# Patient Record
Sex: FEMALE | Race: BLACK OR AFRICAN AMERICAN | NOT HISPANIC OR LATINO | Employment: OTHER | URBAN - METROPOLITAN AREA
[De-identification: names, ages, dates, MRNs, and addresses within clinical notes are randomized per-mention and may not be internally consistent; named-entity substitution may affect disease eponyms.]

---

## 2017-06-13 RX ORDER — ATENOLOL 25 MG/1
25 TABLET ORAL DAILY
Qty: 30 TABLET | Refills: 5 | Status: SHIPPED | OUTPATIENT
Start: 2017-06-13 | End: 2019-01-03 | Stop reason: ALTCHOICE

## 2018-06-28 RX ORDER — ATENOLOL 25 MG/1
TABLET ORAL
Qty: 30 TABLET | Refills: 4 | OUTPATIENT
Start: 2018-06-28

## 2019-01-03 ENCOUNTER — OFFICE VISIT (OUTPATIENT)
Dept: CARDIOLOGY | Facility: CLINIC | Age: 59
End: 2019-01-03

## 2019-01-03 VITALS
SYSTOLIC BLOOD PRESSURE: 147 MMHG | HEIGHT: 67 IN | HEART RATE: 65 BPM | DIASTOLIC BLOOD PRESSURE: 88 MMHG | WEIGHT: 192 LBS | BODY MASS INDEX: 30.13 KG/M2

## 2019-01-03 DIAGNOSIS — E78.5 HYPERLIPIDEMIA, UNSPECIFIED HYPERLIPIDEMIA TYPE: Primary | ICD-10-CM

## 2019-01-03 DIAGNOSIS — R94.31 ABNORMAL EKG: ICD-10-CM

## 2019-01-03 DIAGNOSIS — R06.02 SOB (SHORTNESS OF BREATH): ICD-10-CM

## 2019-01-03 DIAGNOSIS — R07.89 CHEST PRESSURE: ICD-10-CM

## 2019-01-03 DIAGNOSIS — I10 BENIGN ESSENTIAL HYPERTENSION: ICD-10-CM

## 2019-01-03 PROCEDURE — 99214 OFFICE O/P EST MOD 30 MIN: CPT | Performed by: INTERNAL MEDICINE

## 2019-01-03 PROCEDURE — 93000 ELECTROCARDIOGRAM COMPLETE: CPT | Performed by: INTERNAL MEDICINE

## 2019-01-03 RX ORDER — TIMOLOL MALEATE 5 MG/ML
1 SOLUTION/ DROPS OPHTHALMIC 2 TIMES DAILY
COMMUNITY

## 2019-01-03 RX ORDER — POTASSIUM CHLORIDE 750 MG/1
10 TABLET, FILM COATED, EXTENDED RELEASE ORAL DAILY
COMMUNITY
Start: 2018-12-20 | End: 2020-05-20 | Stop reason: ALTCHOICE

## 2019-01-03 RX ORDER — BISOPROLOL FUMARATE AND HYDROCHLOROTHIAZIDE 10; 6.25 MG/1; MG/1
1 TABLET ORAL DAILY
Qty: 30 TABLET | Refills: 6 | Status: SHIPPED | OUTPATIENT
Start: 2019-01-03 | End: 2019-08-19 | Stop reason: SDUPTHER

## 2019-01-03 RX ORDER — AZITHROMYCIN 250 MG/1
TABLET, FILM COATED ORAL
COMMUNITY
Start: 2019-01-02 | End: 2019-01-16

## 2019-01-03 RX ORDER — ASPIRIN 325 MG
325 TABLET, DELAYED RELEASE (ENTERIC COATED) ORAL DAILY
COMMUNITY

## 2019-01-03 NOTE — PROGRESS NOTES
Subjective:        Bruna Jacobs is a 58 y.o. female who here for follow up    CC  BP RUNNING HIGH    K RUNNING LOW  HPI  58-year-old female with a known history of benign essential arterial hypertension, hyperlipidemia with occasional chest pressures and abnormal EKG along with the shortness of breath here for the follow-up complaining that the patient blood pressure has been running low as well as the blood pressure has been running high     Problem List Items Addressed This Visit        Cardiovascular and Mediastinum    Benign essential hypertension    Relevant Medications    bisoprolol-hydrochlorothiazide (ZIAC) 10-6.25 MG per tablet    Other Relevant Orders    ECG 12 Lead    Stress Test With Myocardial Perfusion One Day    Adult Transthoracic Echo Complete W/ Cont if Necessary Per Protocol    Hyperlipidemia - Primary    Relevant Orders    Stress Test With Myocardial Perfusion One Day    Adult Transthoracic Echo Complete W/ Cont if Necessary Per Protocol      Other Visit Diagnoses     Chest pressure        Relevant Orders    Stress Test With Myocardial Perfusion One Day    Adult Transthoracic Echo Complete W/ Cont if Necessary Per Protocol    Abnormal EKG        Relevant Orders    Stress Test With Myocardial Perfusion One Day    Adult Transthoracic Echo Complete W/ Cont if Necessary Per Protocol    SOB (shortness of breath)        Relevant Orders    Stress Test With Myocardial Perfusion One Day    Adult Transthoracic Echo Complete W/ Cont if Necessary Per Protocol        .    The following portions of the patient's history were reviewed and updated as appropriate: allergies, current medications, past family history, past medical history, past social history, past surgical history and problem list.    Past Medical History:   Diagnosis Date   • Heart murmur    • Hyperlipidemia    • Hypertension      reports that  has never smoked. she has never used smokeless tobacco. She reports that she does not drink alcohol  "or use drugs.   Family History   Problem Relation Age of Onset   • Diabetes Mother    • Heart disease Mother    • Hypertension Mother    • Diabetes Father    • Hypertension Father    • Heart failure Father    • Heart attack Neg Hx        Review of Systems  Constitutional: No wt loss, fever, fatigue  Gastrointestinal: No nausea, abdominal pain  Behavioral/Psych: No insomnia or anxiety   Cardiovascular no chest pains or tightness in chest  Objective:       Physical Exam           Physical Exam  /88   Pulse 65   Ht 170.2 cm (67\")   Wt 87.1 kg (192 lb)   BMI 30.07 kg/m²     General appearance: NAD, conversant   Eyes: anicteric sclerae, moist conjunctivae; no lid-lag; PERRLA   HENT: Atraumatic; oropharynx clear with moist mucous membranes and no mucosal ulcerations;  normal hard and soft palate   Neck: Trachea midline; FROM, supple, no thyromegaly or lymphadenopathy   Lungs: CTA, with normal respiratory effort and no intercostal retractions   CV: S1-S2 regular, no murmurs, no rub, no gallop   Abdomen: Soft, non-tender; no masses or HSM   Extremities: No peripheral edema or extremity lymphadenopathy  Skin: Normal temperature, turgor and texture; no rash, ulcers or subcutaneous nodules   Psych: Appropriate affect, alert and oriented to person, place and time             ECG 12 Lead  Date/Time: 1/3/2019 10:16 AM  Performed by: Graciela Boland MD  Authorized by: Graciela Boland MD   Comparison: compared with previous ECG   Similar to previous ECG  Rhythm: sinus rhythm  ST Flattening: all  Clinical impression: non-specific ECG              Echocardiogram:        Current Outpatient Medications:   •  aspirin  MG tablet, Take 325 mg by mouth Every 6 (Six) Hours As Needed., Disp: , Rfl:   •  atenolol (TENORMIN) 25 MG tablet, Take 1 tablet by mouth Daily., Disp: 30 tablet, Rfl: 5  •  azithromycin (ZITHROMAX) 250 MG tablet, , Disp: , Rfl:   •  hydrochlorothiazide (HYDRODIURIL) 25 MG tablet, , Disp: , " Rfl:   •  potassium chloride (K-DUR) 10 MEQ CR tablet, , Disp: , Rfl:   •  timolol (TIMOPTIC) 0.5 % ophthalmic solution, 1 drop 2 (Two) Times a Day., Disp: , Rfl:    Assessment:        Patient Active Problem List   Diagnosis   • Benign essential hypertension   • Fatigue   • Left carotid bruit   • Palpitations               Plan:            ICD-10-CM ICD-9-CM   1. Hyperlipidemia, unspecified hyperlipidemia type E78.5 272.4   2. Benign essential hypertension I10 401.1   3. Chest pressure R07.89 786.59   4. Abnormal EKG R94.31 794.31   5. SOB (shortness of breath) R06.02 786.05     1. Hyperlipidemia, unspecified hyperlipidemia type  Risk of the hyperlipidemia, importance of the treatment has been explained    Pros and cons of the antilipemic drugs has been explained    Regular blood workup as well as side effects including the liver failure, myelopathy death has been explained        - Stress Test With Myocardial Perfusion One Day  - Adult Transthoracic Echo Complete W/ Cont if Necessary Per Protocol    2. Benign essential hypertension    - ECG 12 Lead  - Stress Test With Myocardial Perfusion One Day  - Adult Transthoracic Echo Complete W/ Cont if Necessary Per Protocol    3. Chest pressure  Considering the patient's symptoms as well as clinical situation and  EKG findings, along with cardiac risk factors, ischemic workup is necessary to rule out ischemic cardiomyopathy, stress induced arrhythmias, and functional capacity for diagnosis as well as prognostic consideration    - Stress Test With Myocardial Perfusion One Day  - Adult Transthoracic Echo Complete W/ Cont if Necessary Per Protocol    4. Abnormal EKG  Considering patient's medical condition as well as the risk factors, patient will require echocardiogram for further evaluation for the LV function, four-chamber evaluation, including the pressures, valvular function and  pericardial disease and pericardial effusion    - Stress Test With Myocardial Perfusion One  Day  - Adult Transthoracic Echo Complete W/ Cont if Necessary Per Protocol    5. SOB (shortness of breath)  Considering the patient's symptoms as well as clinical situation and  EKG findings, along with cardiac risk factors, ischemic workup is necessary to rule out ischemic cardiomyopathy, stress induced arrhythmias, and functional capacity for diagnosis as well as prognostic consideration    - Stress Test With Myocardial Perfusion One Day  - Adult Transthoracic Echo Complete W/ Cont if Necessary Per Protocol       DC HCTZ, ATENOLOL    START ZIAC    CAN NOT WALK    WALKING LEXISCAN, ECHO    SEE IN 1 MONTH  COUNSELING:    Bruna Price was given to patient for the following topics: diagnostic results, risk factor reductions, impressions, risks and benefits of treatment options and importance of treatment compliance .       SMOKING COUNSELING:    Counseling given: Not Answered      Dictated using Dragon dictation

## 2019-01-07 ENCOUNTER — HOSPITAL ENCOUNTER (OUTPATIENT)
Dept: CARDIOLOGY | Facility: HOSPITAL | Age: 59
Discharge: HOME | End: 2019-01-07
Attending: INTERNAL MEDICINE | Admitting: INTERNAL MEDICINE

## 2019-01-07 VITALS — SYSTOLIC BLOOD PRESSURE: 140 MMHG | DIASTOLIC BLOOD PRESSURE: 93 MMHG

## 2019-01-07 PROCEDURE — 93306 TTE W/DOPPLER COMPLETE: CPT

## 2019-01-07 PROCEDURE — 93306 TTE W/DOPPLER COMPLETE: CPT | Performed by: INTERNAL MEDICINE

## 2019-01-08 LAB
BH CV ECHO MEAS - ACS: 1.5 CM
BH CV ECHO MEAS - AO MAX PG (FULL): 5.4 MMHG
BH CV ECHO MEAS - AO MAX PG: 10.4 MMHG
BH CV ECHO MEAS - AO MEAN PG (FULL): 3 MMHG
BH CV ECHO MEAS - AO MEAN PG: 5 MMHG
BH CV ECHO MEAS - AO ROOT AREA (BSA CORRECTED): 1.1
BH CV ECHO MEAS - AO ROOT AREA: 3.5 CM^2
BH CV ECHO MEAS - AO ROOT DIAM: 2.1 CM
BH CV ECHO MEAS - AO V2 MAX: 161 CM/SEC
BH CV ECHO MEAS - AO V2 MEAN: 104 CM/SEC
BH CV ECHO MEAS - AO V2 VTI: 35.1 CM
BH CV ECHO MEAS - AVA(I,A): 2.1 CM^2
BH CV ECHO MEAS - AVA(I,D): 2.1 CM^2
BH CV ECHO MEAS - AVA(V,A): 2.2 CM^2
BH CV ECHO MEAS - AVA(V,D): 2.2 CM^2
BH CV ECHO MEAS - BSA(HAYCOCK): 2.1 M^2
BH CV ECHO MEAS - BSA: 2 M^2
BH CV ECHO MEAS - BZI_BMI: 30.1 KILOGRAMS/M^2
BH CV ECHO MEAS - BZI_METRIC_HEIGHT: 170.2 CM
BH CV ECHO MEAS - BZI_METRIC_WEIGHT: 87.1 KG
BH CV ECHO MEAS - EDV(CUBED): 74.1 ML
BH CV ECHO MEAS - EDV(MOD-SP2): 76 ML
BH CV ECHO MEAS - EDV(MOD-SP4): 91 ML
BH CV ECHO MEAS - EDV(TEICH): 78.6 ML
BH CV ECHO MEAS - EF(CUBED): 67.1 %
BH CV ECHO MEAS - EF(MOD-BP): 63 %
BH CV ECHO MEAS - EF(MOD-SP2): 60.5 %
BH CV ECHO MEAS - EF(MOD-SP4): 62.6 %
BH CV ECHO MEAS - EF(TEICH): 59 %
BH CV ECHO MEAS - ESV(CUBED): 24.4 ML
BH CV ECHO MEAS - ESV(MOD-SP2): 30 ML
BH CV ECHO MEAS - ESV(MOD-SP4): 34 ML
BH CV ECHO MEAS - ESV(TEICH): 32.2 ML
BH CV ECHO MEAS - FS: 31 %
BH CV ECHO MEAS - IVS/LVPW: 1
BH CV ECHO MEAS - IVSD: 1 CM
BH CV ECHO MEAS - LAT PEAK E' VEL: 5 CM/SEC
BH CV ECHO MEAS - LV DIASTOLIC VOL/BSA (35-75): 45.8 ML/M^2
BH CV ECHO MEAS - LV MASS(C)D: 137.2 GRAMS
BH CV ECHO MEAS - LV MASS(C)DI: 69.1 GRAMS/M^2
BH CV ECHO MEAS - LV MAX PG: 5 MMHG
BH CV ECHO MEAS - LV MEAN PG: 2 MMHG
BH CV ECHO MEAS - LV SYSTOLIC VOL/BSA (12-30): 17.1 ML/M^2
BH CV ECHO MEAS - LV V1 MAX: 112 CM/SEC
BH CV ECHO MEAS - LV V1 MEAN: 63.4 CM/SEC
BH CV ECHO MEAS - LV V1 VTI: 23.8 CM
BH CV ECHO MEAS - LVIDD: 4.2 CM
BH CV ECHO MEAS - LVIDS: 2.9 CM
BH CV ECHO MEAS - LVLD AP2: 7.3 CM
BH CV ECHO MEAS - LVLD AP4: 7.7 CM
BH CV ECHO MEAS - LVLS AP2: 6.8 CM
BH CV ECHO MEAS - LVLS AP4: 7 CM
BH CV ECHO MEAS - LVOT AREA (M): 3.1 CM^2
BH CV ECHO MEAS - LVOT AREA: 3.1 CM^2
BH CV ECHO MEAS - LVOT DIAM: 2 CM
BH CV ECHO MEAS - LVPWD: 1 CM
BH CV ECHO MEAS - MED PEAK E' VEL: 5 CM/SEC
BH CV ECHO MEAS - MV A DUR: 0.12 SEC
BH CV ECHO MEAS - MV A MAX VEL: 106 CM/SEC
BH CV ECHO MEAS - MV DEC SLOPE: 258 CM/SEC^2
BH CV ECHO MEAS - MV DEC TIME: 0.27 SEC
BH CV ECHO MEAS - MV E MAX VEL: 71.1 CM/SEC
BH CV ECHO MEAS - MV E/A: 0.67
BH CV ECHO MEAS - MV MAX PG: 4.2 MMHG
BH CV ECHO MEAS - MV MEAN PG: 1 MMHG
BH CV ECHO MEAS - MV P1/2T MAX VEL: 81.7 CM/SEC
BH CV ECHO MEAS - MV P1/2T: 92.7 MSEC
BH CV ECHO MEAS - MV V2 MAX: 103 CM/SEC
BH CV ECHO MEAS - MV V2 MEAN: 43.1 CM/SEC
BH CV ECHO MEAS - MV V2 VTI: 37.6 CM
BH CV ECHO MEAS - MVA P1/2T LCG: 2.7 CM^2
BH CV ECHO MEAS - MVA(P1/2T): 2.4 CM^2
BH CV ECHO MEAS - MVA(VTI): 2 CM^2
BH CV ECHO MEAS - PA ACC TIME: 0.1 SEC
BH CV ECHO MEAS - PA MAX PG (FULL): 2.1 MMHG
BH CV ECHO MEAS - PA MAX PG: 3.9 MMHG
BH CV ECHO MEAS - PA PR(ACCEL): 34 MMHG
BH CV ECHO MEAS - PA V2 MAX: 99 CM/SEC
BH CV ECHO MEAS - PULM A REVS DUR: 0.13 SEC
BH CV ECHO MEAS - PULM A REVS VEL: 27.8 CM/SEC
BH CV ECHO MEAS - PULM DIAS VEL: 51.4 CM/SEC
BH CV ECHO MEAS - PULM S/D: 1.4
BH CV ECHO MEAS - PULM SYS VEL: 69.8 CM/SEC
BH CV ECHO MEAS - PVA(V,A): 3.6 CM^2
BH CV ECHO MEAS - PVA(V,D): 3.6 CM^2
BH CV ECHO MEAS - QP/QS: 1.1
BH CV ECHO MEAS - RAP SYSTOLE: 3 MMHG
BH CV ECHO MEAS - RV MAX PG: 1.8 MMHG
BH CV ECHO MEAS - RV MEAN PG: 1 MMHG
BH CV ECHO MEAS - RV V1 MAX: 67.1 CM/SEC
BH CV ECHO MEAS - RV V1 MEAN: 43.4 CM/SEC
BH CV ECHO MEAS - RV V1 VTI: 15.9 CM
BH CV ECHO MEAS - RVOT AREA: 5.3 CM^2
BH CV ECHO MEAS - RVOT DIAM: 2.6 CM
BH CV ECHO MEAS - SI(AO): 61.2 ML/M^2
BH CV ECHO MEAS - SI(CUBED): 25 ML/M^2
BH CV ECHO MEAS - SI(LVOT): 37.6 ML/M^2
BH CV ECHO MEAS - SI(MOD-SP2): 23.1 ML/M^2
BH CV ECHO MEAS - SI(MOD-SP4): 28.7 ML/M^2
BH CV ECHO MEAS - SI(TEICH): 23.3 ML/M^2
BH CV ECHO MEAS - SUP REN AO DIAM: 1.4 CM
BH CV ECHO MEAS - SV(AO): 121.6 ML
BH CV ECHO MEAS - SV(CUBED): 49.7 ML
BH CV ECHO MEAS - SV(LVOT): 74.8 ML
BH CV ECHO MEAS - SV(MOD-SP2): 46 ML
BH CV ECHO MEAS - SV(MOD-SP4): 57 ML
BH CV ECHO MEAS - SV(RVOT): 84.4 ML
BH CV ECHO MEAS - SV(TEICH): 46.4 ML
BH CV ECHO MEAS - TAPSE (>1.6): 1.9 CM2
BH CV ECHO MEASUREMENTS AVERAGE E/E' RATIO: 14.22
BH CV VAS BP RIGHT ARM: NORMAL MMHG
BH CV XLRA - RV BASE: 3.2 CM
BH CV XLRA - TDI S': 17 CM/SEC
LEFT ATRIUM VOLUME INDEX: 19 ML/M2
MAXIMAL PREDICTED HEART RATE: 161 BPM
STRESS TARGET HR: 137 BPM

## 2019-01-16 ENCOUNTER — HOSPITAL ENCOUNTER (OUTPATIENT)
Dept: CARDIOLOGY | Facility: HOSPITAL | Age: 59
Discharge: HOME | End: 2019-01-16
Attending: INTERNAL MEDICINE

## 2019-01-16 ENCOUNTER — TELEPHONE (OUTPATIENT)
Dept: CARDIOLOGY | Facility: CLINIC | Age: 59
End: 2019-01-16

## 2019-01-16 VITALS
WEIGHT: 193 LBS | BODY MASS INDEX: 30.29 KG/M2 | OXYGEN SATURATION: 96 % | DIASTOLIC BLOOD PRESSURE: 64 MMHG | HEART RATE: 45 BPM | HEIGHT: 67 IN | SYSTOLIC BLOOD PRESSURE: 126 MMHG | RESPIRATION RATE: 18 BRPM

## 2019-01-16 PROCEDURE — 78452 HT MUSCLE IMAGE SPECT MULT: CPT | Performed by: INTERNAL MEDICINE

## 2019-01-16 PROCEDURE — 78452 HT MUSCLE IMAGE SPECT MULT: CPT

## 2019-01-16 PROCEDURE — 93017 CV STRESS TEST TRACING ONLY: CPT

## 2019-01-16 PROCEDURE — 25010000002 REGADENOSON 0.4 MG/5ML SOLUTION: Performed by: INTERNAL MEDICINE

## 2019-01-16 PROCEDURE — 93016 CV STRESS TEST SUPVJ ONLY: CPT | Performed by: NURSE PRACTITIONER

## 2019-01-16 PROCEDURE — 93018 CV STRESS TEST I&R ONLY: CPT | Performed by: INTERNAL MEDICINE

## 2019-01-16 PROCEDURE — A9500 TC99M SESTAMIBI: HCPCS | Performed by: INTERNAL MEDICINE

## 2019-01-16 PROCEDURE — 0 TECHNETIUM SESTAMIBI: Performed by: INTERNAL MEDICINE

## 2019-01-16 RX ORDER — LORAZEPAM 0.5 MG/1
TABLET ORAL
COMMUNITY
Start: 2019-01-10 | End: 2019-03-15 | Stop reason: HOSPADM

## 2019-01-16 RX ADMIN — TECHNETIUM TC 99M SESTAMIBI 1 DOSE: 1 INJECTION INTRAVENOUS at 09:42

## 2019-01-16 RX ADMIN — REGADENOSON 0.4 MG: 0.08 INJECTION, SOLUTION INTRAVENOUS at 09:43

## 2019-01-16 RX ADMIN — TECHNETIUM TC 99M SESTAMIBI 1 DOSE: 1 INJECTION INTRAVENOUS at 07:35

## 2019-01-16 NOTE — TELEPHONE ENCOUNTER
Patient started on repatha, missed home visit to instruct patient on use of injection pen.   Teaching provided to patient with satisfactory return demonstration. Step-by-step guide given to patient for reference.   ALP

## 2019-01-22 ENCOUNTER — OFFICE VISIT (OUTPATIENT)
Dept: OBSTETRICS AND GYNECOLOGY | Facility: CLINIC | Age: 59
End: 2019-01-22

## 2019-01-22 VITALS
WEIGHT: 197 LBS | DIASTOLIC BLOOD PRESSURE: 80 MMHG | HEIGHT: 67 IN | SYSTOLIC BLOOD PRESSURE: 124 MMHG | BODY MASS INDEX: 30.92 KG/M2

## 2019-01-22 DIAGNOSIS — Z01.419 ENCOUNTER FOR ANNUAL ROUTINE GYNECOLOGICAL EXAMINATION: Primary | ICD-10-CM

## 2019-01-22 LAB
BH CV STRESS BP STAGE 1: NORMAL
BH CV STRESS COMMENTS STAGE 1: NORMAL
BH CV STRESS DOSE REGADENOSON STAGE 1: 0.4
BH CV STRESS DURATION MIN STAGE 1: 3
BH CV STRESS DURATION SEC STAGE 1: 1
BH CV STRESS GRADE STAGE 1: 0
BH CV STRESS HR STAGE 1: 83
BH CV STRESS METS STAGE 1: 1.4
BH CV STRESS PROTOCOL 1: NORMAL
BH CV STRESS RECOVERY BP: NORMAL MMHG
BH CV STRESS RECOVERY HR: 64 BPM
BH CV STRESS RECOVERY O2: 96 %
BH CV STRESS SPEED STAGE 1: 1
BH CV STRESS STAGE 1: 1
LV EF NUC BP: 75 %
MAXIMAL PREDICTED HEART RATE: 161 BPM
PERCENT MAX PREDICTED HR: 51.55 %
STRESS BASELINE BP: NORMAL MMHG
STRESS BASELINE HR: 51 BPM
STRESS O2 SAT REST: 96 %
STRESS PERCENT HR: 61 %
STRESS POST ESTIMATED WORKLOAD: 1.4 METS
STRESS POST EXERCISE DUR MIN: 3 MIN
STRESS POST EXERCISE DUR SEC: 1 SEC
STRESS POST PEAK BP: NORMAL MMHG
STRESS POST PEAK HR: 83 BPM
STRESS TARGET HR: 137 BPM

## 2019-01-22 PROCEDURE — 99396 PREV VISIT EST AGE 40-64: CPT | Performed by: OBSTETRICS & GYNECOLOGY

## 2019-01-22 NOTE — PROGRESS NOTES
Subjective:    Patient Bruna Jacobs is a 58 y.o. female.   Chief Complaint   Patient presents with   • Gynecologic Exam     AE,      CC annual exam    HPI      The following portions of the patient's history were reviewed and updated as appropriate: allergies, current medications, past family history, past medical history, past social history, past surgical history and problem list.      Review of Systems   Constitutional: Negative.    HENT: Negative.    Eyes: Negative.    Respiratory: Negative.    Cardiovascular: Negative.    Gastrointestinal: Negative for abdominal distention, abdominal pain, anal bleeding, blood in stool, constipation, diarrhea, nausea, rectal pain and vomiting.   Endocrine: Negative for cold intolerance, heat intolerance, polydipsia, polyphagia and polyuria.   Genitourinary: Negative.  Negative for decreased urine volume, dyspareunia, dysuria, enuresis, flank pain, frequency, genital sores, hematuria, menstrual problem, pelvic pain, urgency, vaginal bleeding, vaginal discharge and vaginal pain.   Musculoskeletal: Negative.    Skin: Negative.    Allergic/Immunologic: Negative.    Neurological: Negative.    Hematological: Negative for adenopathy. Does not bruise/bleed easily.   Psychiatric/Behavioral: Negative for agitation, confusion and sleep disturbance. The patient is not nervous/anxious.          Objective:      Physical Exam   Constitutional: She appears well-developed and well-nourished. She is not intubated.   HENT:   Head: Hair is normal.   Nose: Nose normal.   Mouth/Throat: Oropharynx is clear and moist.   Eyes: Conjunctivae are normal.   Neck: Normal carotid pulses and no JVD present. No tracheal tenderness, no spinous process tenderness and no muscular tenderness present. Carotid bruit is not present. No neck rigidity. No edema, no erythema and normal range of motion present. No thyroid mass and no thyromegaly present.   Cardiovascular: Normal rate, regular rhythm, S1 normal and  normal heart sounds. Exam reveals no gallop.   No murmur heard.  Pulmonary/Chest: Effort normal. No accessory muscle usage or stridor. No apnea, no tachypnea and no bradypnea. She is not intubated. No respiratory distress. She has no wheezes. She has no rales. She exhibits no tenderness. Right breast exhibits no inverted nipple, no mass, no nipple discharge, no skin change and no tenderness. Left breast exhibits no inverted nipple, no mass, no nipple discharge, no skin change and no tenderness.   Abdominal: Soft. Bowel sounds are normal. She exhibits no distension and no mass. There is no tenderness. There is no rebound and no guarding. No hernia.   Genitourinary: Vagina normal and uterus normal. Rectal exam shows no external hemorrhoid, no internal hemorrhoid, no fissure, no mass, no tenderness and anal tone normal. There is no rash, tenderness, lesion or injury on the right labia. There is no rash, tenderness, lesion or injury on the left labia. Uterus is not deviated, not enlarged, not fixed and not tender. Cervix exhibits no motion tenderness, no discharge and no friability. Right adnexum displays no mass and no tenderness. Left adnexum displays no mass and no tenderness. No erythema, tenderness or bleeding in the vagina. No foreign body in the vagina. No signs of injury around the vagina. No vaginal discharge found.   Musculoskeletal: She exhibits no edema or tenderness.        Right shoulder: She exhibits no tenderness, no swelling, no pain and no spasm.   Lymphadenopathy:        Head (right side): No submental, no submandibular, no tonsillar, no preauricular, no posterior auricular and no occipital adenopathy present.        Head (left side): No submental, no submandibular, no tonsillar, no preauricular, no posterior auricular and no occipital adenopathy present.     She has no cervical adenopathy.        Right cervical: No superficial cervical, no deep cervical and no posterior cervical adenopathy present.        Left cervical: No superficial cervical, no deep cervical and no posterior cervical adenopathy present.        Right axillary: No pectoral and no lateral adenopathy present.        Left axillary: No pectoral and no lateral adenopathy present.       Right: No inguinal, no supraclavicular and no epitrochlear adenopathy present.        Left: No inguinal, no supraclavicular and no epitrochlear adenopathy present.   Neurological: No cranial nerve deficit. Coordination normal.   Skin: Skin is warm and dry. No abrasion, no bruising, no burn, no lesion, no petechiae, no purpura and no rash noted. Rash is not macular, not maculopapular, not nodular and not urticarial. No cyanosis or erythema. No pallor. Nails show no clubbing.   Psychiatric: She has a normal mood and affect. Her behavior is normal.         Assessment and Plan:    Patient has been instructed to perform a self breast exam on a weekly basis, a yearly mammogram, pap smear yearly unless instructed otherwise and bone density every 2 years.  I recommended that the patient not smoke, and discussed smoking cessation when appropriate.     Bruna was seen today for gynecologic exam.    Diagnoses and all orders for this visit:    Encounter for annual routine gynecological examination  -     Pap IG, Rfx HPV ASCU

## 2019-01-24 LAB
CONV .: NORMAL
CYTOLOGIST CVX/VAG CYTO: NORMAL
CYTOLOGY CVX/VAG DOC THIN PREP: NORMAL
DX ICD CODE: NORMAL
HIV 1 & 2 AB SER-IMP: NORMAL
Lab: NORMAL
OTHER STN SPEC: NORMAL
PATH REPORT.FINAL DX SPEC: NORMAL
STAT OF ADQ CVX/VAG CYTO-IMP: NORMAL

## 2019-02-05 ENCOUNTER — OFFICE VISIT (OUTPATIENT)
Dept: CARDIOLOGY | Facility: CLINIC | Age: 59
End: 2019-02-05

## 2019-02-05 VITALS
BODY MASS INDEX: 30.76 KG/M2 | DIASTOLIC BLOOD PRESSURE: 86 MMHG | SYSTOLIC BLOOD PRESSURE: 150 MMHG | WEIGHT: 196 LBS | HEIGHT: 67 IN | HEART RATE: 50 BPM

## 2019-02-05 DIAGNOSIS — R00.2 PALPITATIONS: Primary | ICD-10-CM

## 2019-02-05 DIAGNOSIS — R53.83 OTHER FATIGUE: ICD-10-CM

## 2019-02-05 DIAGNOSIS — I10 BENIGN ESSENTIAL HYPERTENSION: ICD-10-CM

## 2019-02-05 DIAGNOSIS — E78.5 HYPERLIPIDEMIA, UNSPECIFIED HYPERLIPIDEMIA TYPE: ICD-10-CM

## 2019-02-05 PROCEDURE — 99214 OFFICE O/P EST MOD 30 MIN: CPT | Performed by: INTERNAL MEDICINE

## 2019-02-05 RX ORDER — EVOLOCUMAB 420 MG/3.5
KIT SUBCUTANEOUS
COMMUNITY
Start: 2019-01-31 | End: 2020-08-23

## 2019-02-05 RX ORDER — AMLODIPINE BESYLATE 5 MG/1
5 TABLET ORAL DAILY
Qty: 30 TABLET | Refills: 11 | Status: SHIPPED | OUTPATIENT
Start: 2019-02-05 | End: 2019-03-05 | Stop reason: ALTCHOICE

## 2019-02-05 NOTE — PROGRESS NOTES
" Subjective:        Bruna Jacobs is a 58 y.o. female who here for follow up    CC  Follow up after starting ziac, bp still running high  HPI  58-year-old female with known history of the benign essential arterial hypertension, palpitations, hyperlipidemia here for the follow-up after the patient was started on Ziac patient blood pressure still running high    Patient denies any chest pains or tightness in chest with no heaviness of the pressure sensation     Problem List Items Addressed This Visit        Cardiovascular and Mediastinum    Benign essential hypertension    Relevant Medications    amLODIPine (NORVASC) 5 MG tablet    Palpitations - Primary    Hyperlipidemia    Relevant Medications    REPATHA PUSHTRONEX SYSTEM 420 MG/3.5ML solution cartridge       Other    Fatigue        .    The following portions of the patient's history were reviewed and updated as appropriate: allergies, current medications, past family history, past medical history, past social history, past surgical history and problem list.    Past Medical History:   Diagnosis Date   • Glaucoma    • Heart murmur    • Hyperlipidemia    • Hypertension      reports that  has never smoked. she has never used smokeless tobacco. She reports that she does not drink alcohol or use drugs.   Family History   Problem Relation Age of Onset   • Diabetes Mother    • Heart disease Mother    • Hypertension Mother    • Diabetes Father    • Hypertension Father    • Heart failure Father    • Heart disease Father    • Heart attack Neg Hx        Review of Systems  Constitutional: No wt loss, fever, fatigue  Gastrointestinal: No nausea, abdominal pain  Behavioral/Psych: No insomnia or anxiety   Cardiovascular no chest pains or tightness in the chest  Objective:        /86   Pulse 50   Ht 170.2 cm (67\")   Wt 88.9 kg (196 lb)   BMI 30.70 kg/m²   General appearance: No acute changes   Neck: Trachea midline; NECK, supple, no thyromegaly or lymphadenopathy "   Lungs: Normal size and shape, normal breath sounds, equal distribution of air, no rales and rhonchi   CV: S1-S2 regular, no murmurs, no rub, no gallop   Abdomen: Soft, non-tender; no masses , no abnormal abdominal sounds   Extremities: No deformity , normal color , no peripheral edema   Skin: Normal temperature, turgor and texture; no rash, ulcers          Procedures      Echocardiogram:        Current Outpatient Medications:   •  aspirin  MG tablet, Take 325 mg by mouth Every 6 (Six) Hours As Needed., Disp: , Rfl:   •  bisoprolol-hydrochlorothiazide (ZIAC) 10-6.25 MG per tablet, Take 1 tablet by mouth Daily., Disp: 30 tablet, Rfl: 6  •  LORazepam (ATIVAN) 0.5 MG tablet, , Disp: , Rfl:   •  potassium chloride (K-DUR) 10 MEQ CR tablet, , Disp: , Rfl:   •  timolol (TIMOPTIC) 0.5 % ophthalmic solution, 1 drop 2 (Two) Times a Day., Disp: , Rfl:   •  REPATHA PUSHTRONEX SYSTEM 420 MG/3.5ML solution cartridge, , Disp: , Rfl:    Assessment:        Patient Active Problem List   Diagnosis   • Benign essential hypertension   • Fatigue   • Left carotid bruit   • Palpitations   • Hyperlipidemia     Interpretation Summary        · Findings consistent with a normal ECG stress test.  · Left ventricular ejection fraction is hyperdynamic (Calculated EF > 70%).  · Myocardial perfusion imaging indicates a normal myocardial perfusion study with no evidence of ischemia.  · Impressions are consistent with a low risk study.         Interpretation Summary     · Saline bubble test neg for PFO  · Calculated EF = 63.0%  · There is no evidence of pericardial effusion.           Plan:            ICD-10-CM ICD-9-CM   1. Palpitations R00.2 785.1   2. Hyperlipidemia, unspecified hyperlipidemia type E78.5 272.4   3. Benign essential hypertension I10 401.1   4. Other fatigue R53.83 780.79     1. Palpitations  Palpitations better    2. Hyperlipidemia, unspecified hyperlipidemia type  Continue the medication    3. Benign essential  hypertension  Blood pressure still high will add Norvasc    4. Other fatigue  Multifactorial       norvasc  5 mg po daily    See in 1 month    Specificity and sensitivity of the stress test/ cardiac workup has been explained. Pt has been explained if  Symptoms continue please go to ER, and further w/p will be required.    Also explained this does not rule out coronary artery disease or the future events, continue to emphasize on risk reductions for coronary artery disease    Pt also advised to contact PCP for other causes of symptoms    COUNSELING:    Bruna Price was given to patient for the following topics: diagnostic results, risk factor reductions, impressions, risks and benefits of treatment options and importance of treatment compliance .       SMOKING COUNSELING:    Counseling given: Not Answered      Dictated using Dragon dictation

## 2019-03-05 ENCOUNTER — OFFICE VISIT (OUTPATIENT)
Dept: CARDIOLOGY | Facility: CLINIC | Age: 59
End: 2019-03-05

## 2019-03-05 VITALS
WEIGHT: 199 LBS | HEIGHT: 67 IN | HEART RATE: 58 BPM | DIASTOLIC BLOOD PRESSURE: 91 MMHG | BODY MASS INDEX: 31.23 KG/M2 | SYSTOLIC BLOOD PRESSURE: 170 MMHG

## 2019-03-05 DIAGNOSIS — I10 BENIGN ESSENTIAL HYPERTENSION: ICD-10-CM

## 2019-03-05 DIAGNOSIS — E78.5 HYPERLIPIDEMIA, UNSPECIFIED HYPERLIPIDEMIA TYPE: ICD-10-CM

## 2019-03-05 DIAGNOSIS — R07.2 PRECORDIAL PAIN: Primary | ICD-10-CM

## 2019-03-05 DIAGNOSIS — R00.2 PALPITATION: ICD-10-CM

## 2019-03-05 PROCEDURE — 99214 OFFICE O/P EST MOD 30 MIN: CPT | Performed by: INTERNAL MEDICINE

## 2019-03-05 RX ORDER — NIFEDIPINE 30 MG/1
30 TABLET, EXTENDED RELEASE ORAL DAILY
Qty: 30 TABLET | Refills: 6 | Status: SHIPPED | OUTPATIENT
Start: 2019-03-05 | End: 2019-05-06

## 2019-03-05 NOTE — H&P (VIEW-ONLY)
Subjective:        Bruna Jacobs is a 58 y.o. female who here for follow up    CC  Sob on excertion    Heart beating fast    Hair falling  HPI  58-year-old female with known history of the benign essential arterial hypertension, hyperlipidemia palpitations as well as a rapid heartbeats continues to complains of shortness of breath on exertion as well as moderate intensity palpitations intermittent     Problem List Items Addressed This Visit        Cardiovascular and Mediastinum    Benign essential hypertension    Relevant Medications    NIFEdipine XL (PROCARDIA XL) 30 MG 24 hr tablet    Hyperlipidemia    Palpitation    Relevant Orders    Treadmill Stress Test    Case Request Cath Lab: Left Heart Cath (Completed)    aPTT (Completed)    Protime-INR (Completed)    CBC & Differential (Completed)    Basic Metabolic Panel (Completed)    CBC Auto Differential (Completed)    Manual Differential    Manual Differential (Completed)       Nervous and Auditory    Precordial pain - Primary    Relevant Orders    Case Request Cath Lab: Left Heart Cath (Completed)    aPTT (Completed)    Protime-INR (Completed)    CBC & Differential (Completed)    Basic Metabolic Panel (Completed)    CBC Auto Differential (Completed)    Manual Differential    Manual Differential (Completed)        .    The following portions of the patient's history were reviewed and updated as appropriate: allergies, current medications, past family history, past medical history, past social history, past surgical history and problem list.    Past Medical History:   Diagnosis Date   • Glaucoma    • Heart murmur    • Hyperlipidemia    • Hypertension      reports that  has never smoked. she has never used smokeless tobacco. She reports that she does not drink alcohol or use drugs.   Family History   Problem Relation Age of Onset   • Diabetes Mother    • Heart disease Mother    • Hypertension Mother    • Diabetes Father    • Hypertension Father    • Heart failure  "Father    • Heart disease Father    • Heart attack Neg Hx        Review of Systems  Constitutional: No wt loss, fever, fatigue  Gastrointestinal: No nausea, abdominal pain  Behavioral/Psych: No insomnia or anxiety   Cardiovascular shortness of breath and palpitations  Objective:       Physical Exam           Physical Exam  /91   Pulse 58   Ht 170.2 cm (67\")   Wt 90.3 kg (199 lb)   BMI 31.17 kg/m²     General appearance: No acute changes   Eyes: Sclera conjunctiva normal, pupils reactive   HENT: Atraumatic; oropharynx clear with moist mucous membranes and no mucosal ulcerations;  Neck: Trachea midline; NECK, supple, no thyromegaly or lymphadenopathy   Lungs: Normal size and shape, normal breath sounds, equal distribution of air, no rales and rhonchi   CV: S1-S2 regular, no murmurs, no rub, no gallop   Abdomen: Soft, non-tender; no masses , no abnormal abdominal sounds   Extremities: No deformity , normal color , no peripheral edema   Skin: Normal temperature, turgor and texture; no rash, ulcers  Psych: Appropriate affect, alert and oriented to person, place and time           Procedures      Echocardiogram:        Current Outpatient Medications:   •  amLODIPine (NORVASC) 5 MG tablet, Take 1 tablet by mouth Daily., Disp: 30 tablet, Rfl: 11  •  aspirin  MG tablet, Take 325 mg by mouth Every 6 (Six) Hours As Needed., Disp: , Rfl:   •  bisoprolol-hydrochlorothiazide (ZIAC) 10-6.25 MG per tablet, Take 1 tablet by mouth Daily., Disp: 30 tablet, Rfl: 6  •  LORazepam (ATIVAN) 0.5 MG tablet, , Disp: , Rfl:   •  potassium chloride (K-DUR) 10 MEQ CR tablet, , Disp: , Rfl:   •  REPATHA PUSHTRONEX SYSTEM 420 MG/3.5ML solution cartridge, , Disp: , Rfl:   •  timolol (TIMOPTIC) 0.5 % ophthalmic solution, 1 drop 2 (Two) Times a Day., Disp: , Rfl:    Assessment:        Patient Active Problem List   Diagnosis   • Benign essential hypertension   • Fatigue   • Left carotid bruit   • Palpitations   • Hyperlipidemia         "       Plan:            ICD-10-CM ICD-9-CM   1. Precordial pain R07.2 786.51   2. Palpitation R00.2 785.1   3. Benign essential hypertension I10 401.1   4. Hyperlipidemia, unspecified hyperlipidemia type E78.5 272.4     1. Palpitation  Considering the patient's symptoms as well as clinical situation and  EKG findings, along with cardiac risk factors, ischemic workup is necessary to rule out ischemic cardiomyopathy, stress induced arrhythmias, and functional capacity for diagnosis as well as prognostic consideration    - Treadmill Stress Test  - Case Request Cath Lab: Left Heart Cath  - aPTT  - Protime-INR  - CBC & Differential  - Basic Metabolic Panel  - CBC Auto Differential  - Manual Differential; Future  - Manual Differential; Future  - Manual Differential    2. Precordial pain  Procedure, risks and options of cardiac cath explained to pt INCLUDING BUT NOT LIMITED TO MI, STROKE, DEATH, INFECTION HAEMORRHAGE, . Pt understands well and agrees with no further questions.  - Case Request Cath Lab: Left Heart Cath  - aPTT  - Protime-INR  - CBC & Differential  - Basic Metabolic Panel  - CBC Auto Differential  - Manual Differential; Future  - Manual Differential; Future  - Manual Differential    3. Benign essential hypertension  Controlled    4. Hyperlipidemia, unspecified hyperlipidemia type  Counseling has been done       bp much better    ETT    SEE IN 6 MONTHS  COUNSELING:    Bruna Price was given to patient for the following topics: diagnostic results, risk factor reductions, impressions, risks and benefits of treatment options and importance of treatment compliance .       SMOKING COUNSELING:    Counseling given: Not Answered      Dictated using Dragon dictation

## 2019-03-11 ENCOUNTER — HOSPITAL ENCOUNTER (OUTPATIENT)
Dept: CARDIOLOGY | Facility: HOSPITAL | Age: 59
Discharge: HOME | End: 2019-03-11
Admitting: INTERNAL MEDICINE

## 2019-03-11 ENCOUNTER — HOSPITAL ENCOUNTER (OUTPATIENT)
Dept: CARDIOLOGY | Facility: HOSPITAL | Age: 59
Discharge: HOME | End: 2019-03-11

## 2019-03-11 VITALS
WEIGHT: 199 LBS | HEIGHT: 67 IN | BODY MASS INDEX: 31.23 KG/M2 | HEART RATE: 59 BPM | DIASTOLIC BLOOD PRESSURE: 70 MMHG | SYSTOLIC BLOOD PRESSURE: 138 MMHG

## 2019-03-11 PROBLEM — R00.2 PALPITATION: Status: ACTIVE | Noted: 2019-03-11

## 2019-03-11 PROBLEM — R07.2 PRECORDIAL PAIN: Status: ACTIVE | Noted: 2019-03-11

## 2019-03-11 LAB
ANION GAP SERPL CALCULATED.3IONS-SCNC: 11.2 MMOL/L
APTT PPP: 34.1 SECONDS (ref 22.7–35.4)
BUN BLD-MCNC: 10 MG/DL (ref 6–20)
BUN/CREAT SERPL: 14.7 (ref 7–25)
CALCIUM SPEC-SCNC: 9.4 MG/DL (ref 8.6–10.5)
CHLORIDE SERPL-SCNC: 108 MMOL/L (ref 98–107)
CO2 SERPL-SCNC: 23.8 MMOL/L (ref 22–29)
CREAT BLD-MCNC: 0.68 MG/DL (ref 0.57–1)
DACRYOCYTES BLD QL SMEAR: ABNORMAL
DEPRECATED RDW RBC AUTO: 52.2 FL (ref 37–54)
ELLIPTOCYTES BLD QL SMEAR: ABNORMAL
ERYTHROCYTE [DISTWIDTH] IN BLOOD BY AUTOMATED COUNT: 15.3 % (ref 12.3–15.4)
GFR SERPL CREATININE-BSD FRML MDRD: 107 ML/MIN/1.73
GLUCOSE BLD-MCNC: 89 MG/DL (ref 65–99)
HCT VFR BLD AUTO: 39 % (ref 34–46.6)
HGB BLD-MCNC: 12.8 G/DL (ref 12–15.9)
INR PPP: 0.96 (ref 0.9–1.1)
LYMPHOCYTES # BLD MANUAL: 3.37 10*3/MM3 (ref 0.7–3.1)
LYMPHOCYTES NFR BLD MANUAL: 66 % (ref 19.6–45.3)
LYMPHOCYTES NFR BLD MANUAL: 9 % (ref 5–12)
MCH RBC QN AUTO: 30.5 PG (ref 26.6–33)
MCHC RBC AUTO-ENTMCNC: 32.8 G/DL (ref 31.5–35.7)
MCV RBC AUTO: 92.9 FL (ref 79–97)
MONOCYTES # BLD AUTO: 0.46 10*3/MM3 (ref 0.1–0.9)
NEUTROPHILS # BLD AUTO: 1.28 10*3/MM3 (ref 1.4–7)
NEUTROPHILS NFR BLD MANUAL: 25 % (ref 42.7–76)
PLAT MORPH BLD: NORMAL
PLATELET # BLD AUTO: 224 10*3/MM3 (ref 140–450)
PMV BLD AUTO: 12.5 FL (ref 6–12)
POTASSIUM BLD-SCNC: 4.4 MMOL/L (ref 3.5–5.2)
PROTHROMBIN TIME: 12.6 SECONDS (ref 11.7–14.2)
RBC # BLD AUTO: 4.2 10*6/MM3 (ref 3.77–5.28)
SODIUM BLD-SCNC: 143 MMOL/L (ref 136–145)
WBC MORPH BLD: NORMAL
WBC NRBC COR # BLD: 5.11 10*3/MM3 (ref 3.4–10.8)

## 2019-03-11 PROCEDURE — 36415 COLL VENOUS BLD VENIPUNCTURE: CPT | Performed by: INTERNAL MEDICINE

## 2019-03-11 PROCEDURE — 85025 COMPLETE CBC W/AUTO DIFF WBC: CPT | Performed by: INTERNAL MEDICINE

## 2019-03-11 PROCEDURE — 85007 BL SMEAR W/DIFF WBC COUNT: CPT | Performed by: INTERNAL MEDICINE

## 2019-03-11 PROCEDURE — 85610 PROTHROMBIN TIME: CPT | Performed by: INTERNAL MEDICINE

## 2019-03-11 PROCEDURE — 80048 BASIC METABOLIC PNL TOTAL CA: CPT | Performed by: INTERNAL MEDICINE

## 2019-03-11 PROCEDURE — 93018 CV STRESS TEST I&R ONLY: CPT | Performed by: INTERNAL MEDICINE

## 2019-03-11 PROCEDURE — 93016 CV STRESS TEST SUPVJ ONLY: CPT | Performed by: INTERNAL MEDICINE

## 2019-03-11 PROCEDURE — 85730 THROMBOPLASTIN TIME PARTIAL: CPT | Performed by: INTERNAL MEDICINE

## 2019-03-11 PROCEDURE — 93017 CV STRESS TEST TRACING ONLY: CPT

## 2019-03-15 ENCOUNTER — HOSPITAL ENCOUNTER (OUTPATIENT)
Facility: HOSPITAL | Age: 59
Setting detail: HOSPITAL OUTPATIENT SURGERY
Discharge: HOME | End: 2019-03-15
Attending: INTERNAL MEDICINE | Admitting: INTERNAL MEDICINE

## 2019-03-15 VITALS
SYSTOLIC BLOOD PRESSURE: 136 MMHG | DIASTOLIC BLOOD PRESSURE: 89 MMHG | TEMPERATURE: 98.1 F | BODY MASS INDEX: 31.23 KG/M2 | HEART RATE: 63 BPM | HEIGHT: 67 IN | RESPIRATION RATE: 18 BRPM | OXYGEN SATURATION: 100 % | WEIGHT: 199 LBS

## 2019-03-15 DIAGNOSIS — R07.2 PRECORDIAL PAIN: ICD-10-CM

## 2019-03-15 DIAGNOSIS — R00.2 PALPITATION: ICD-10-CM

## 2019-03-15 LAB
BH CV STRESS BP STAGE 1: NORMAL
BH CV STRESS BP STAGE 2: NORMAL
BH CV STRESS DURATION MIN STAGE 1: 3
BH CV STRESS DURATION MIN STAGE 2: 2
BH CV STRESS DURATION SEC STAGE 1: 0
BH CV STRESS DURATION SEC STAGE 2: 38
BH CV STRESS GRADE STAGE 1: 10
BH CV STRESS GRADE STAGE 2: 12
BH CV STRESS HR STAGE 1: 104
BH CV STRESS HR STAGE 2: 125
BH CV STRESS METS STAGE 1: 4.6
BH CV STRESS METS STAGE 2: 6.6
BH CV STRESS PROTOCOL 1: NORMAL
BH CV STRESS RECOVERY BP: NORMAL MMHG
BH CV STRESS RECOVERY HR: 65 BPM
BH CV STRESS SPEED STAGE 1: 1.7
BH CV STRESS SPEED STAGE 2: 2.5
BH CV STRESS STAGE 1: 1
BH CV STRESS STAGE 2: 2
MAXIMAL PREDICTED HEART RATE: 161 BPM
PERCENT MAX PREDICTED HR: 77.64 %
STRESS BASELINE BP: NORMAL MMHG
STRESS BASELINE HR: 52 BPM
STRESS PERCENT HR: 91 %
STRESS POST ESTIMATED WORKLOAD: 6.8 METS
STRESS POST EXERCISE DUR MIN: 5 MIN
STRESS POST EXERCISE DUR SEC: 38 SEC
STRESS POST PEAK BP: NORMAL MMHG
STRESS POST PEAK HR: 125 BPM
STRESS TARGET HR: 137 BPM

## 2019-03-15 PROCEDURE — 25010000002 MIDAZOLAM PER 1 MG: Performed by: INTERNAL MEDICINE

## 2019-03-15 PROCEDURE — 93458 L HRT ARTERY/VENTRICLE ANGIO: CPT | Performed by: INTERNAL MEDICINE

## 2019-03-15 PROCEDURE — 25010000002 HEPARIN (PORCINE) PER 1000 UNITS: Performed by: INTERNAL MEDICINE

## 2019-03-15 PROCEDURE — 25010000002 FENTANYL CITRATE (PF) 100 MCG/2ML SOLUTION: Performed by: INTERNAL MEDICINE

## 2019-03-15 PROCEDURE — C1769 GUIDE WIRE: HCPCS | Performed by: INTERNAL MEDICINE

## 2019-03-15 PROCEDURE — 0 IOPAMIDOL PER 1 ML: Performed by: INTERNAL MEDICINE

## 2019-03-15 PROCEDURE — C1894 INTRO/SHEATH, NON-LASER: HCPCS | Performed by: INTERNAL MEDICINE

## 2019-03-15 PROCEDURE — 99152 MOD SED SAME PHYS/QHP 5/>YRS: CPT | Performed by: INTERNAL MEDICINE

## 2019-03-15 RX ORDER — LIDOCAINE HYDROCHLORIDE 10 MG/ML
0.1 INJECTION, SOLUTION EPIDURAL; INFILTRATION; INTRACAUDAL; PERINEURAL ONCE AS NEEDED
Status: DISCONTINUED | OUTPATIENT
Start: 2019-03-15 | End: 2019-03-15 | Stop reason: HOSPADM

## 2019-03-15 RX ORDER — FENTANYL CITRATE 50 UG/ML
INJECTION, SOLUTION INTRAMUSCULAR; INTRAVENOUS AS NEEDED
Status: DISCONTINUED | OUTPATIENT
Start: 2019-03-15 | End: 2019-03-15 | Stop reason: HOSPADM

## 2019-03-15 RX ORDER — LIDOCAINE HYDROCHLORIDE 20 MG/ML
INJECTION, SOLUTION INFILTRATION; PERINEURAL AS NEEDED
Status: DISCONTINUED | OUTPATIENT
Start: 2019-03-15 | End: 2019-03-15 | Stop reason: HOSPADM

## 2019-03-15 RX ORDER — SODIUM CHLORIDE 9 MG/ML
75 INJECTION, SOLUTION INTRAVENOUS CONTINUOUS
Status: DISCONTINUED | OUTPATIENT
Start: 2019-03-15 | End: 2019-03-15 | Stop reason: HOSPADM

## 2019-03-15 RX ORDER — SODIUM CHLORIDE 0.9 % (FLUSH) 0.9 %
3-10 SYRINGE (ML) INJECTION AS NEEDED
Status: DISCONTINUED | OUTPATIENT
Start: 2019-03-15 | End: 2019-03-15 | Stop reason: HOSPADM

## 2019-03-15 RX ORDER — SODIUM CHLORIDE 9 MG/ML
100 INJECTION, SOLUTION INTRAVENOUS CONTINUOUS
Status: DISCONTINUED | OUTPATIENT
Start: 2019-03-15 | End: 2019-03-15 | Stop reason: HOSPADM

## 2019-03-15 RX ORDER — MIDAZOLAM HYDROCHLORIDE 1 MG/ML
INJECTION INTRAMUSCULAR; INTRAVENOUS AS NEEDED
Status: DISCONTINUED | OUTPATIENT
Start: 2019-03-15 | End: 2019-03-15 | Stop reason: HOSPADM

## 2019-03-15 RX ORDER — SODIUM CHLORIDE 0.9 % (FLUSH) 0.9 %
3 SYRINGE (ML) INJECTION EVERY 12 HOURS SCHEDULED
Status: DISCONTINUED | OUTPATIENT
Start: 2019-03-15 | End: 2019-03-15 | Stop reason: HOSPADM

## 2019-03-15 RX ADMIN — SODIUM CHLORIDE 75 ML/HR: 9 INJECTION, SOLUTION INTRAVENOUS at 06:57

## 2019-03-15 NOTE — DISCHARGE INSTRUCTIONS
[facility]  [address] [place] [address]    Coronary Angiogram (Radial/Ulnar Approach) After Care    Refer to this sheet in the next few weeks. These instructions provide you with information on caring for yourself after your procedure. Your caregiver may also give you more specific instructions. Your treatment has been planned according to current medical practices, but problems sometimes occur. Call your caregiver if you have any problems or questions after your procedure.    Home Care Instructions:  · You may shower the day after the procedure. Remove the bandage (dressing) and gently wash the site with plain soap and water. Gently pat the site dry. You may apply a band aid daily for 2 days if desired.    · Do not apply powder or lotion to the site.  · Do not submerge the affected site in water for 3 to 5 days or until the site is completely healed.   · Do not lift, push or pull anything over 10 pounds for 2 days after your procedure.  · Inspect the site at least twice daily. You may notice some bruising at the site and it may be tender for 1 to 2 weeks.     · Increase your fluid intake for the next 2 days.    · Keep arm elevated for 24 hours. For the remainder of the day, keep your arm in “Pledge of Allegiance” position when up and about.     · You may drive 24 hours after the procedure unless otherwise instructed by your caregiver.  · Do not operate machinery or power tools for 24 hours.  · A responsible adult should be with you for the first 24 hours after you arrive home. Do not make any important legal decisions or sign legal papers for 24 hours.  Do not drink alcohol for 24 hours.    · Metformin or any medications containing Metformin should not be taken for 48 hours after your procedure.      Call Your Doctor if:   · You have unusual pain at the radial/ulnar (wrist) site.  · You have redness, warmth, swelling, or pain at the radial/ulnar (wrist) site.  · You have drainage (other  than a small amount of blood on the dressing).  · You have chills or a fever > 101.  · Your arm becomes pale or dark, cool, tingly, or numb.  · You have heavy bleeding from the site, hold pressure on the site for 20 minutes.  If the bleeding stops, apply a fresh bandage and call your cardiologist.  However, if you continue to have bleeding, call 911.

## 2019-03-18 ENCOUNTER — APPOINTMENT (OUTPATIENT)
Dept: WOMENS IMAGING | Facility: HOSPITAL | Age: 59
End: 2019-03-18

## 2019-03-18 ENCOUNTER — PROCEDURE VISIT (OUTPATIENT)
Dept: OBSTETRICS AND GYNECOLOGY | Facility: CLINIC | Age: 59
End: 2019-03-18

## 2019-03-18 DIAGNOSIS — Z12.31 VISIT FOR SCREENING MAMMOGRAM: Primary | ICD-10-CM

## 2019-03-18 PROCEDURE — 77067 SCR MAMMO BI INCL CAD: CPT | Performed by: RADIOLOGY

## 2019-03-18 PROCEDURE — 77067 SCR MAMMO BI INCL CAD: CPT | Performed by: OBSTETRICS & GYNECOLOGY

## 2019-04-03 ENCOUNTER — OFFICE VISIT (OUTPATIENT)
Dept: CARDIOLOGY | Facility: CLINIC | Age: 59
End: 2019-04-03

## 2019-04-03 VITALS
DIASTOLIC BLOOD PRESSURE: 98 MMHG | HEIGHT: 67 IN | SYSTOLIC BLOOD PRESSURE: 151 MMHG | HEART RATE: 57 BPM | WEIGHT: 201 LBS | BODY MASS INDEX: 31.55 KG/M2

## 2019-04-03 DIAGNOSIS — R00.2 PALPITATIONS: ICD-10-CM

## 2019-04-03 DIAGNOSIS — Z98.890 S/P CARDIAC CATHETERIZATION: ICD-10-CM

## 2019-04-03 DIAGNOSIS — E78.5 HYPERLIPIDEMIA, UNSPECIFIED HYPERLIPIDEMIA TYPE: ICD-10-CM

## 2019-04-03 DIAGNOSIS — R06.02 SOB (SHORTNESS OF BREATH): ICD-10-CM

## 2019-04-03 DIAGNOSIS — I10 ESSENTIAL HYPERTENSION: Primary | ICD-10-CM

## 2019-04-03 PROCEDURE — 99212 OFFICE O/P EST SF 10 MIN: CPT | Performed by: NURSE PRACTITIONER

## 2019-04-03 RX ORDER — RANITIDINE 150 MG/1
TABLET ORAL
COMMUNITY
Start: 2019-03-29 | End: 2020-04-29 | Stop reason: ALTCHOICE

## 2019-04-03 NOTE — PROGRESS NOTES
Subjective:        Bruna Jacobs is a 58 y.o. female who here for follow up    No chief complaint on file.      HPI   Bruna Jacobs is a 58-year-old female, who is new to me.  She has a history of benign essential arterial hypertension, hyperlipidemia, palpitations, rapid heartbeats.  Who recently came to see Dr. Boland on 3/5/2019 and had complaints of shortness of breath on exertion as well as moderately intensity palpitations intermittent.     She had an echo on 1/2019 which showed saline bubble test negative for PFO, EF 63%.  A stress test on 1/22/2019 which showed LV EF > 70% with a normal myocardial perfusion study with no evidence of ischemia.  On 3/15/2019 she had a cardiac catheterization which showed normal coronary arteries, normal LVEDP.  Recommendations was medical management.  The cath was performed using her right radial.    She states she is still losing her hair.She says her palpitations, and shortness of breath has improved.        The following portions of the patient's history were reviewed and updated as appropriate: allergies, current medications, past family history, past medical history, past social history, past surgical history and problem list.    Past Medical History:   Diagnosis Date   • Glaucoma    • Heart murmur    • Hyperlipidemia    • Hypertension          reports that she has never smoked. She has never used smokeless tobacco. She reports that she drinks alcohol. She reports that she does not use drugs.     Family History   Problem Relation Age of Onset   • Diabetes Mother    • Heart disease Mother    • Hypertension Mother    • Diabetes Father    • Hypertension Father    • Heart failure Father    • Heart disease Father    • Heart attack Neg Hx        ROS     Review of Systems  Constitutional: No wt loss, fever, fatigue  Gastrointestinal: No nausea, abdominal pain  Behavioral/Psych: No insomnia or anxiety  Cardiovascular: Denies shortness of breath, and  palpitations.      Objective:           Physical Exam   Constitutional: She is oriented to person, place, and time. She appears well-developed and well-nourished.   HENT:   Head: Normocephalic.   Right Ear: External ear normal.   Left Ear: External ear normal.   Eyes: EOM are normal.   Neck: Normal range of motion. No JVD present.   Cardiovascular: Normal rate, regular rhythm, normal heart sounds and intact distal pulses. Exam reveals no gallop and no friction rub.   No murmur heard.  Pulmonary/Chest: Effort normal and breath sounds normal. No stridor. No respiratory distress. She has no rales.   Abdominal: Soft. Bowel sounds are normal. She exhibits no distension. There is no tenderness. There is no guarding.   Musculoskeletal: Normal range of motion. She exhibits no edema or tenderness.   Neurological: She is alert and oriented to person, place, and time. She has normal reflexes.   Skin: Skin is warm.   Psychiatric: She has a normal mood and affect. Judgment normal.   Nursing note and vitals reviewed.             Current Outpatient Medications:   •  aspirin  MG tablet, Take 325 mg by mouth Every 6 (Six) Hours As Needed., Disp: , Rfl:   •  bisoprolol-hydrochlorothiazide (ZIAC) 10-6.25 MG per tablet, Take 1 tablet by mouth Daily., Disp: 30 tablet, Rfl: 6  •  NIFEdipine XL (PROCARDIA XL) 30 MG 24 hr tablet, Take 1 tablet by mouth Daily., Disp: 30 tablet, Rfl: 6  •  potassium chloride (K-DUR) 10 MEQ CR tablet, Take 10 mEq by mouth Daily., Disp: , Rfl:   •  REPATHA PUSHTRONEX SYSTEM 420 MG/3.5ML solution cartridge, Inject  under the skin into the appropriate area as directed Every 28 (Twenty-Eight) Days., Disp: , Rfl:   •  timolol (TIMOPTIC) 0.5 % ophthalmic solution, Administer 1 drop to both eyes 2 (Two) Times a Day., Disp: , Rfl:      Assessment:        Patient Active Problem List   Diagnosis   • Benign essential hypertension   • Fatigue   • Left carotid bruit   • Palpitations   • Hyperlipidemia   •  Palpitation   • Precordial pain               Plan:   1. S/p cardiac catheterization: She had an echo on 1/2019 which showed saline bubble test negative for PFO, EF 63%.  A stress test on 1/22/2019 which showed LV EF > 70% with a normal myocardial perfusion study with no evidence of ischemia.  On 3/15/2019 she had a cardiac catheterization which showed normal coronary arteries, normal LVEDP.  Recommendations was medical management.  The cath was performed using her right radial.    2. Benign essential arterial hypertension:  Beta blocker, aspirin.  Blood pressure in office 151/98, heart rate 57.       She states the pressure is better at home systolic in the 120s-130s.  She would like to stay on the medicine for 1 more month but she thinks it may be making her hair fall out.  She is going to do blood pressure diaries at home and come back before her next visit in 1 month for a CMP.  Counseled on diet and exercise.    Importance of controlling hypertension and blood pressure  checkup on the regular basis has been explained. Hypertension as a silent killer has been discussed. Risk reduction of the weight and regular exercises to control the hypertension has been explained.      3. Hyperlipidemia:   Currently on Repatha.  MA's helped/taught her on applying Repatha. Risk of the hyperlipidemia, importance of the treatment has been explained. Pros and cons of the statins has been explained. Regular blood workup as well as side effects including the liver failure, myelopathy death has been explained.    4. Palpitations: Denies any chest  Denies palpitations at this time.     5. Shortness of breath:  multifactorial        No diagnosis found.    There are no diagnoses linked to this encounter.    COUNSELING:    Bruna Price was given to patient for the following topics: diagnostic results, risk factor reductions, impressions, risks and benefits of treatment options and importance of treatment compliance .        SMOKING COUNSELING:    Counseling given: Not Answered    She will follow up with Dr. Boland and have a CMP before her appointment.    Sincerely,   JERONIMO Linton  [facility]  04/03/19  9:27 AM

## 2019-04-26 ENCOUNTER — HOSPITAL ENCOUNTER (OUTPATIENT)
Dept: CARDIOLOGY | Facility: HOSPITAL | Age: 59
Discharge: HOME | End: 2019-04-26
Admitting: NURSE PRACTITIONER

## 2019-04-26 LAB
ALBUMIN SERPL-MCNC: 4.8 G/DL (ref 3.5–5.2)
ALBUMIN/GLOB SERPL: 1.9 G/DL
ALP SERPL-CCNC: 72 U/L (ref 39–117)
ALT SERPL W P-5'-P-CCNC: 20 U/L (ref 1–33)
ANION GAP SERPL CALCULATED.3IONS-SCNC: 9.8 MMOL/L
AST SERPL-CCNC: 21 U/L (ref 1–32)
BILIRUB SERPL-MCNC: 0.4 MG/DL (ref 0.2–1.2)
BUN BLD-MCNC: 10 MG/DL (ref 6–20)
BUN/CREAT SERPL: 12.3 (ref 7–25)
CALCIUM SPEC-SCNC: 9.2 MG/DL (ref 8.6–10.5)
CHLORIDE SERPL-SCNC: 104 MMOL/L (ref 98–107)
CHOLEST SERPL-MCNC: 155 MG/DL (ref 0–200)
CO2 SERPL-SCNC: 29.2 MMOL/L (ref 22–29)
CREAT BLD-MCNC: 0.81 MG/DL (ref 0.57–1)
GFR SERPL CREATININE-BSD FRML MDRD: 88 ML/MIN/1.73
GLOBULIN UR ELPH-MCNC: 2.5 GM/DL
GLUCOSE BLD-MCNC: 110 MG/DL (ref 65–99)
HDLC SERPL-MCNC: 62 MG/DL (ref 40–60)
LDLC SERPL CALC-MCNC: 74 MG/DL (ref 0–100)
LDLC/HDLC SERPL: 1.19 {RATIO}
POTASSIUM BLD-SCNC: 4.4 MMOL/L (ref 3.5–5.2)
PROT SERPL-MCNC: 7.3 G/DL (ref 6–8.5)
SODIUM BLD-SCNC: 143 MMOL/L (ref 136–145)
TRIGL SERPL-MCNC: 96 MG/DL (ref 0–150)
VLDLC SERPL-MCNC: 19.2 MG/DL (ref 5–40)

## 2019-04-26 PROCEDURE — 80053 COMPREHEN METABOLIC PANEL: CPT | Performed by: NURSE PRACTITIONER

## 2019-04-26 PROCEDURE — 36415 COLL VENOUS BLD VENIPUNCTURE: CPT | Performed by: NURSE PRACTITIONER

## 2019-04-26 PROCEDURE — 80061 LIPID PANEL: CPT | Performed by: NURSE PRACTITIONER

## 2019-05-06 ENCOUNTER — OFFICE VISIT (OUTPATIENT)
Dept: CARDIOLOGY | Facility: CLINIC | Age: 59
End: 2019-05-06

## 2019-05-06 VITALS
HEART RATE: 49 BPM | DIASTOLIC BLOOD PRESSURE: 88 MMHG | SYSTOLIC BLOOD PRESSURE: 149 MMHG | WEIGHT: 198 LBS | HEIGHT: 67 IN | BODY MASS INDEX: 31.08 KG/M2

## 2019-05-06 DIAGNOSIS — I10 BENIGN ESSENTIAL HYPERTENSION: ICD-10-CM

## 2019-05-06 DIAGNOSIS — R00.2 PALPITATIONS: Primary | ICD-10-CM

## 2019-05-06 DIAGNOSIS — R07.2 PRECORDIAL PAIN: ICD-10-CM

## 2019-05-06 PROCEDURE — 99213 OFFICE O/P EST LOW 20 MIN: CPT | Performed by: INTERNAL MEDICINE

## 2019-05-06 NOTE — PROGRESS NOTES
" Subjective:        Bruna Jacobs is a 58 y.o. female who here for follow up    CC  Follow up ziac    Post cath  HPI  58-year-old female with known history of the benign essential arterial hypertension, palpitations, precordial chest pain here for the follow-up after the heart catheter which was normal, also was started on Ziac, patient has markedly improved on palpitations and blood pressure and has no side effects     Problem List Items Addressed This Visit        Cardiovascular and Mediastinum    Benign essential hypertension    Palpitations - Primary       Nervous and Auditory    Precordial pain        1. .Normal coronary arteries  2. Normal LVEDP     Recommendations:      1. Medical management      The following portions of the patient's history were reviewed and updated as appropriate: allergies, current medications, past family history, past medical history, past social history, past surgical history and problem list.    Past Medical History:   Diagnosis Date   • Glaucoma    • Heart murmur    • Hyperlipidemia    • Hypertension      reports that she has never smoked. She has never used smokeless tobacco. She reports that she drinks alcohol. She reports that she does not use drugs.   Family History   Problem Relation Age of Onset   • Diabetes Mother    • Heart disease Mother    • Hypertension Mother    • Diabetes Father    • Hypertension Father    • Heart failure Father    • Heart disease Father    • Heart attack Neg Hx        Review of Systems  Constitutional: No wt loss, fever, fatigue  Gastrointestinal: No nausea, abdominal pain  Behavioral/Psych: No insomnia or anxiety   Cardiovascular no chest pains or tightness no chest  Objective:       Physical Exam    /88 (BP Location: Left arm, Patient Position: Sitting)   Pulse (!) 49   Ht 170.2 cm (67\")   Wt 89.8 kg (198 lb)   BMI 31.01 kg/m²   General appearance: No acute changes   Neck: Trachea midline; NECK, supple, no thyromegaly or lymphadenopathy "   Lungs: Normal size and shape, normal breath sounds, equal distribution of air, no rales and rhonchi   CV: S1-S2 regular, no murmurs, no rub, no gallop   Abdomen: Soft, non-tender; no masses , no abnormal abdominal sounds   Extremities: No deformity , normal color , no peripheral edema   Skin: Normal temperature, turgor and texture; no rash, ulcers          Procedures      Echocardiogram:        Current Outpatient Medications:   •  aspirin  MG tablet, Take 325 mg by mouth Every 6 (Six) Hours As Needed., Disp: , Rfl:   •  bisoprolol-hydrochlorothiazide (ZIAC) 10-6.25 MG per tablet, Take 1 tablet by mouth Daily., Disp: 30 tablet, Rfl: 6  •  Fexofenadine HCl (ALLEGRA ALLERGY PO), Take  by mouth., Disp: , Rfl:   •  potassium chloride (K-DUR) 10 MEQ CR tablet, Take 10 mEq by mouth Daily., Disp: , Rfl:   •  raNITIdine (ZANTAC) 150 MG tablet, , Disp: , Rfl:   •  REPATHA PUSHTRONEX SYSTEM 420 MG/3.5ML solution cartridge, Inject  under the skin into the appropriate area as directed Every 28 (Twenty-Eight) Days., Disp: , Rfl:   •  timolol (TIMOPTIC) 0.5 % ophthalmic solution, Administer 1 drop to both eyes 2 (Two) Times a Day., Disp: , Rfl:    Assessment:        Patient Active Problem List   Diagnosis   • Benign essential hypertension   • Fatigue   • Left carotid bruit   • Palpitations   • Hyperlipidemia   • Palpitation   • Precordial pain               Plan:            ICD-10-CM ICD-9-CM   1. Palpitations R00.2 785.1   2. Benign essential hypertension I10 401.1   3. Precordial pain R07.2 786.51     1. Palpitations  Much better with Ziac    2. Benign essential hypertension  Much better with Ziac    3. Precordial pain  Bruna Jacobs here for the follow-up post heart catheter, being treated medically.Bruna Jacobs has no complications.  Access site has been examined shows no complications.  Patient has been advised to contact us with any further issues and questions related to the heart catheter         bp  better    Post cath ok    See in 1 yr  COUNSELING:    Bruna Price was given to patient for the following topics: diagnostic results, risk factor reductions, impressions, risks and benefits of treatment options and importance of treatment compliance .       SMOKING COUNSELING:    Counseling given: Not Answered      Dictated using Dragon dictation

## 2019-05-30 ENCOUNTER — TELEPHONE (OUTPATIENT)
Dept: CARDIOLOGY | Facility: CLINIC | Age: 59
End: 2019-05-30

## 2019-05-30 NOTE — TELEPHONE ENCOUNTER
----- Message from Chelo Pantoja sent at 5/28/2019  9:33 AM EDT -----  Regarding: BP MEDICATION  Contact: [phone]  WAS TOLD TO CALL BACK IF SHE WAS STILL HAVING PROBLEMS WITH HER MEDICATION SRC WOULD CHANGE TO SOMETHING ELSE    PLEASE CALL THANKS      Pt states she is Fatigued, SOA, and just don't feel well taking Bisoprolol/hctz 10/6.25 mg    Per Dr. Boland pt to take half dose.    Pt verbalized understanding

## 2019-08-21 RX ORDER — BISOPROLOL FUMARATE AND HYDROCHLOROTHIAZIDE 10; 6.25 MG/1; MG/1
TABLET ORAL
Qty: 30 TABLET | Refills: 5 | Status: SHIPPED | OUTPATIENT
Start: 2019-08-21 | End: 2020-06-11 | Stop reason: ALTCHOICE

## 2020-04-29 ENCOUNTER — OFFICE VISIT (OUTPATIENT)
Dept: CARDIOLOGY | Facility: CLINIC | Age: 60
End: 2020-04-29

## 2020-04-29 VITALS
DIASTOLIC BLOOD PRESSURE: 91 MMHG | HEIGHT: 67 IN | HEART RATE: 61 BPM | SYSTOLIC BLOOD PRESSURE: 148 MMHG | WEIGHT: 203 LBS | BODY MASS INDEX: 31.86 KG/M2

## 2020-04-29 DIAGNOSIS — R00.2 PALPITATION: Primary | ICD-10-CM

## 2020-04-29 DIAGNOSIS — R06.02 SOB (SHORTNESS OF BREATH): ICD-10-CM

## 2020-04-29 DIAGNOSIS — I10 ESSENTIAL HYPERTENSION: ICD-10-CM

## 2020-04-29 DIAGNOSIS — E78.5 HYPERLIPIDEMIA, UNSPECIFIED HYPERLIPIDEMIA TYPE: ICD-10-CM

## 2020-04-29 PROCEDURE — 99442 PR PHYS/QHP TELEPHONE EVALUATION 11-20 MIN: CPT | Performed by: NURSE PRACTITIONER

## 2020-04-29 RX ORDER — FAMOTIDINE 20 MG/1
20 TABLET, FILM COATED ORAL 2 TIMES DAILY PRN
COMMUNITY
Start: 2020-03-12

## 2020-04-29 RX ORDER — LISINOPRIL 40 MG/1
20 TABLET ORAL DAILY
COMMUNITY
Start: 2020-04-03 | End: 2020-11-16

## 2020-04-29 NOTE — PROGRESS NOTES
Subjective:        Bruna Jacobs is a 59 y.o. female who here for follow up    No chief complaint on file.      HPI   Bruna Jacobs is a 59-year-old female, who is current with me. She has a history of hypertension, hyperlipidemia, and palpitations. She had a cardiac cath on 3/15/2019 which revealed normal coronary arteries. Her echo on 1/7/2019 revealed EF 63%, negative saline bubble test and no evidence of pericardial effusion. On Repatha, and her previous lipid profile revealed , HDL 62, LDL 74, and triglycerides 96.    Denies chest pain, shortness of breath, and palpitations.     The following portions of the patient's history were reviewed and updated as appropriate: allergies, current medications, past family history, past medical history, past social history, past surgical history and problem list.    Past Medical History:   Diagnosis Date   • Glaucoma    • Heart murmur    • Hyperlipidemia    • Hypertension          reports that she has never smoked. She has never used smokeless tobacco. She reports that she drinks alcohol. She reports that she does not use drugs.     Family History   Problem Relation Age of Onset   • Diabetes Mother    • Heart disease Mother    • Hypertension Mother    • Diabetes Father    • Hypertension Father    • Heart failure Father    • Heart disease Father    • Heart attack Neg Hx        ROS     Review of Systems  Constitutional: No loss, fever, fatigue  Gastrointestinal: No nausea, abdominal pain  Behavioral/Psych: No insomnia or anxiety  Cardiovascular: Denies chest pain, shortness of breath, syncope, and palpitations.       Objective:       This is a telephone visit, unable to do PE.    Physical Exam     Conclusion        · Successful right internal coronary angiogram and LV pressures  · Normal coronary arteries with normal LVEDP     Interpretation Summary     · Saline bubble test neg for PFO  · Calculated EF = 63.0%  · There is no evidence of pericardial effusion.            Current Outpatient Medications:   •  aspirin  MG tablet, Take 325 mg by mouth Every 6 (Six) Hours As Needed., Disp: , Rfl:   •  bisoprolol-hydrochlorothiazide (ZIAC) 10-6.25 MG per tablet, TAKE ONE TABLET BY MOUTH DAILY, Disp: 30 tablet, Rfl: 5  •  Fexofenadine HCl (ALLEGRA ALLERGY PO), Take  by mouth., Disp: , Rfl:   •  potassium chloride (K-DUR) 10 MEQ CR tablet, Take 10 mEq by mouth Daily., Disp: , Rfl:   •  raNITIdine (ZANTAC) 150 MG tablet, , Disp: , Rfl:   •  REPATHA PUSHTRONEX SYSTEM 420 MG/3.5ML solution cartridge, Inject  under the skin into the appropriate area as directed Every 28 (Twenty-Eight) Days., Disp: , Rfl:   •  timolol (TIMOPTIC) 0.5 % ophthalmic solution, Administer 1 drop to both eyes 2 (Two) Times a Day., Disp: , Rfl:      Assessment:        Patient Active Problem List   Diagnosis   • Benign essential hypertension   • Fatigue   • Left carotid bruit   • Palpitations   • Hyperlipidemia   • Palpitation   • Precordial pain               Plan:   1. Benign essential arterial hypertension: Ischemic work up as above. She states her blood pressure has been stable on current medications. Today slightly elevated. She states she will continue to monitor her blood pressure for the next two weeks. She does not want to be placed on more medications. She states a lot of blood pressure medications make her lose her hair. Continue BB.    Educated patient on exercising for at least 30 minutes a day for 2 to 3 days a week. Importance of controlling hypertension and blood pressure checkup on the regular basis has been explained. Hypertension as a silent killer has been discussed. Risk reduction of the weight and regular exercises to control the hypertension has been explained.    2. Hyperlipidemia: Previous lipid profile on 4/26/2019 revealed , HDL 62, LDL 74, and triglycerides. Continue Repatha. She states her endocrinologist checks her lipid profile.     Risk of the hyperlipidemia,  importance of the treatment has been explained. Pros and cons of the statins has been explained. Regular blood workup as well as side effects including the liver failure, myelopathy death has been explained.      3. Palpitations: She states she had some recently. Her blood pressure monitor said she has been having irregular hear beats. Will do holter monitor. Continue BB.      4. shortness of breath: Denies. multifactorial    5. MAC:  She states she has not used them in a while and that her PCP was trying to get her off of them. She will revisit this with him.           No diagnosis found.    There are no diagnoses linked to this encounter.    COUNSELING: Bharathi Price was given to patient for the following topics: diagnostic results, risk factor reductions, impressions, risks and benefits of treatment options and importance of treatment compliance .       SMOKING COUNSELING: Denies    Counseling given: Not Answered    She will need a holter and follow up in two weeks for blood pressure recheck.      Sincerely,   JERONIMO Linton  [facility]  04/29/20   1033    This patient has consented to a telehealth visit via telephone. The visit was scheduled as a telephone visit to comply with patient safety concerns in accordance with CDC recommendations.  All vitals recorded within this visit are reported by the patient.  I spent  35 minutes in total including but not limited to the 19 minutes spent in direct conversation with this patient.

## 2020-05-19 NOTE — PROGRESS NOTES
Subjective:        Bruna Jacobs is a 59 y.o. female who here for follow up    No chief complaint on file.    She is here today for blood pressure recheck via telephone    HPI       Bruna Jacobs is a 59-year-old female, who is current with this provider.  She has a history of hypertension, hyperlipidemia, and palpitations.  Her previous cardiac cath on 3/15/2019 revealed normal coronary arteries.  Her echo on 1/7/2019 revealed EF 63%, negative saline bubble test and no evidence of pericardial effusion.  She is currently on Repatha and her previous lipid profile revealed , HDL 62, LDL 74, and triglycerides 96.She was seen by me approximately 2 weeks ago and at that time I ordered a Holter monitor which is now pending and for her to follow-up in 2 weeks with a blood pressure recheck.  She states her blood pressure has been better controlled on current medications.    Denies chest pain, shortness of breath, and palpitations.     The following portions of the patient's history were reviewed and updated as appropriate: allergies, current medications, past family history, past medical history, past social history, past surgical history and problem list.    Past Medical History:   Diagnosis Date   • GERD (gastroesophageal reflux disease)    • Glaucoma    • Heart murmur    • Hyperlipidemia    • Hypertension          reports that she has never smoked. She has never used smokeless tobacco. She reports that she drinks alcohol. She reports that she does not use drugs.     Family History   Problem Relation Age of Onset   • Diabetes Mother    • Heart disease Mother    • Hypertension Mother    • Diabetes Father    • Hypertension Father    • Heart failure Father    • Heart disease Father    • Heart attack Neg Hx        ROS     Review of Systems  Constitutional: No loss, fever, fatigue. Dependent BLE swelling  Gastrointestinal: No nausea, abdominal pain  Behavioral/Psych: No insomnia or anxiety  Cardiovascular: Denies  chest pain, shortness of breath, syncope, and palpitations.       Objective:       This is a telephone visit, unable to do PE.    Physical Exam     Conclusion        · Successful right internal coronary angiogram and LV pressures  · Normal coronary arteries with normal LVEDP     Interpretation Summary     · Saline bubble test neg for PFO  · Calculated EF = 63.0%  · There is no evidence of pericardial effusion.           Current Outpatient Medications:   •  aspirin  MG tablet, Take 325 mg by mouth Every 6 (Six) Hours As Needed., Disp: , Rfl:   •  bisoprolol-hydrochlorothiazide (ZIAC) 10-6.25 MG per tablet, TAKE ONE TABLET BY MOUTH DAILY, Disp: 30 tablet, Rfl: 5  •  famotidine (PEPCID) 20 MG tablet, Take 20 mg by mouth 2 (Two) Times a Day., Disp: , Rfl:   •  Fexofenadine HCl (ALLEGRA ALLERGY PO), Take  by mouth., Disp: , Rfl:   •  lisinopril (PRINIVIL,ZESTRIL) 40 MG tablet, Take 40 mg by mouth Daily., Disp: , Rfl:   •  potassium chloride (K-DUR) 10 MEQ CR tablet, Take 10 mEq by mouth Daily., Disp: , Rfl:   •  REPATHA PUSHTRONEX SYSTEM 420 MG/3.5ML solution cartridge, Inject  under the skin into the appropriate area as directed Every 28 (Twenty-Eight) Days., Disp: , Rfl:   •  timolol (TIMOPTIC) 0.5 % ophthalmic solution, Administer 1 drop to both eyes 2 (Two) Times a Day., Disp: , Rfl:      Assessment:        Patient Active Problem List   Diagnosis   • Benign essential hypertension   • Fatigue   • Left carotid bruit   • Palpitations   • Hyperlipidemia   • Palpitation   • Precordial pain               Plan:   1.  Benign essential arterial hypertension: Ischemic work-up as above.  She states her blood pressure has been stable since her prior visit. On her last visit her blood pressure was slightly elevated and she wanted to monitor her blood pressure from the past 2 weeks without adding any medications because she is worried about side effects of medications.  She continued the beta-blocker.    Educated patient on  exercising for at least 30 minutes a day for 2 to 3 days a week. Importance of controlling hypertension and blood pressure checkup on the regular basis has been explained. Hypertension as a silent killer has been discussed. Risk reduction of the weight and regular exercises to control the hypertension has been explained.     2.  hyperlipidemia: Previous lipid profile on 4/26/2019 revealed , HDL 62, LDL 74, and triglycerides 96.  She states her endocrinologist monitors her cholesterol levels.    Risk of the hyperlipidemia, importance of the treatment has been explained. Pros and cons of the statins has been explained. Regular blood workup as well as side effects including the liver failure, myelopathy death has been explained.     3.  Palpitations: Monitor is pending.  Continue BB.  Once the monitor has been evaluated we will let her know the results.     4.  Shortness of breath: Denies any shortness of breath at this time.  Multifactorial.     5.  MAC: She states she has not used her CPAP.       No diagnosis found.    There are no diagnoses linked to this encounter.    COUNSELING: Bharathi Price was given to patient for the following topics: diagnostic results, risk factor reductions, impressions, risks and benefits of treatment options and importance of treatment compliance .       SMOKING COUNSELING: Denies  Counseling given: Not Answered    Awaiting results of her holter, then she will follow up for results.     Sincerely,   JERONIMO Linton  [facility]  05/19/20   [id number]     This patient has consented to a telehealth visit via telephone. The visit was scheduled as a telephone visit to comply with patient safety concerns in accordance with CDC recommendations.  All vitals recorded within this visit are reported by the patient.  I spent  25 minutes in total including but not limited to the 8 minutes spent in direct conversation with this patient.     EMR  Dragon/Transcription disclaimer:   Much of this encounter note is an electronic transcription/translation of spoken language to printed text. The electronic translation of spoken language may permit erroneous, or at times, nonsensical words or phrases to be inadvertently transcribed; Although I have reviewed the note for such errors, some may still exist.

## 2020-05-20 ENCOUNTER — OFFICE VISIT (OUTPATIENT)
Dept: CARDIOLOGY | Facility: CLINIC | Age: 60
End: 2020-05-20

## 2020-05-20 VITALS
WEIGHT: 205 LBS | SYSTOLIC BLOOD PRESSURE: 116 MMHG | HEART RATE: 62 BPM | DIASTOLIC BLOOD PRESSURE: 73 MMHG | BODY MASS INDEX: 32.18 KG/M2 | HEIGHT: 67 IN

## 2020-05-20 DIAGNOSIS — R06.02 SOB (SHORTNESS OF BREATH): ICD-10-CM

## 2020-05-20 DIAGNOSIS — I10 ESSENTIAL HYPERTENSION: Primary | ICD-10-CM

## 2020-05-20 DIAGNOSIS — R00.2 PALPITATION: ICD-10-CM

## 2020-05-20 DIAGNOSIS — E78.5 HYPERLIPIDEMIA, UNSPECIFIED HYPERLIPIDEMIA TYPE: ICD-10-CM

## 2020-05-20 DIAGNOSIS — G47.33 OBSTRUCTIVE SLEEP APNEA SYNDROME: ICD-10-CM

## 2020-05-20 PROCEDURE — 99441 PR PHYS/QHP TELEPHONE EVALUATION 5-10 MIN: CPT | Performed by: NURSE PRACTITIONER

## 2020-06-11 ENCOUNTER — OFFICE VISIT (OUTPATIENT)
Dept: CARDIOLOGY | Facility: CLINIC | Age: 60
End: 2020-06-11

## 2020-06-11 VITALS
HEART RATE: 55 BPM | HEIGHT: 67 IN | DIASTOLIC BLOOD PRESSURE: 88 MMHG | SYSTOLIC BLOOD PRESSURE: 146 MMHG | BODY MASS INDEX: 32.11 KG/M2

## 2020-06-11 DIAGNOSIS — I10 BENIGN ESSENTIAL HYPERTENSION: ICD-10-CM

## 2020-06-11 DIAGNOSIS — R00.2 PALPITATIONS: Primary | ICD-10-CM

## 2020-06-11 DIAGNOSIS — E78.5 HYPERLIPIDEMIA, UNSPECIFIED HYPERLIPIDEMIA TYPE: ICD-10-CM

## 2020-06-11 PROCEDURE — 99213 OFFICE O/P EST LOW 20 MIN: CPT | Performed by: INTERNAL MEDICINE

## 2020-06-11 NOTE — PROGRESS NOTES
"Results     Subjective:        Bruna Jacobs is a 59 y.o. female who here for follow up    CC  HAIR FALL WITH ZIAC  HPI  60-year-old female with a known history of the benign essential arterial hypertension palpitation and hyperlipidemia here for the follow-up and complaining that the patient is having the hair falls with Ziac     Problem List Items Addressed This Visit        Cardiovascular and Mediastinum    Benign essential hypertension    Relevant Medications    nebivolol (Bystolic) 5 MG tablet    Palpitations - Primary    Hyperlipidemia        .    The following portions of the patient's history were reviewed and updated as appropriate: allergies, current medications, past family history, past medical history, past social history, past surgical history and problem list.    Past Medical History:   Diagnosis Date   • GERD (gastroesophageal reflux disease)    • Glaucoma    • Heart murmur    • Hyperlipidemia    • Hypertension      reports that she has never smoked. She has never used smokeless tobacco. She reports that she drinks alcohol. She reports that she does not use drugs.   Family History   Problem Relation Age of Onset   • Diabetes Mother    • Heart disease Mother    • Hypertension Mother    • Diabetes Father    • Hypertension Father    • Heart failure Father    • Heart disease Father    • Heart attack Neg Hx        Review of Systems  Constitutional: No wt loss, fever, fatigue  Gastrointestinal: No nausea, abdominal pain  Behavioral/Psych: No insomnia or anxiety   Cardiovascular no chest pains or tightness in the chest  Objective:       Physical Exam  /88 (BP Location: Right arm, Patient Position: Sitting)   Pulse 55   Ht 170.2 cm (67\")   BMI 32.11 kg/m²   General appearance: No acute changes   Neck: Trachea midline; NECK, supple, no thyromegaly or lymphadenopathy   Lungs: Normal size and shape, normal breath sounds, equal distribution of air, no rales and rhonchi   CV: S1-S2 regular, no murmurs, " no rub, no gallop   Abdomen: Soft, non-tender; no masses , no abnormal abdominal sounds   Extremities: No deformity , normal color , no peripheral edema   Skin: Normal temperature, turgor and texture; no rash, ulcers          Procedures      Echocardiogram:        Current Outpatient Medications:   •  aspirin  MG tablet, Take 325 mg by mouth Every 6 (Six) Hours As Needed., Disp: , Rfl:   •  bisoprolol-hydrochlorothiazide (ZIAC) 10-6.25 MG per tablet, TAKE ONE TABLET BY MOUTH DAILY, Disp: 30 tablet, Rfl: 5  •  famotidine (PEPCID) 20 MG tablet, Take 20 mg by mouth 2 (Two) Times a Day., Disp: , Rfl:   •  Fexofenadine HCl (ALLEGRA ALLERGY PO), Take  by mouth., Disp: , Rfl:   •  lisinopril (PRINIVIL,ZESTRIL) 40 MG tablet, Take 40 mg by mouth Daily., Disp: , Rfl:   •  REPATHA PUSHTRONEX SYSTEM 420 MG/3.5ML solution cartridge, Inject  under the skin into the appropriate area as directed Every 28 (Twenty-Eight) Days., Disp: , Rfl:   •  timolol (TIMOPTIC) 0.5 % ophthalmic solution, Administer 1 drop to both eyes 2 (Two) Times a Day., Disp: , Rfl:    Assessment:        Patient Active Problem List   Diagnosis   • Benign essential hypertension   • Fatigue   • Left carotid bruit   • Palpitations   • Hyperlipidemia   • Palpitation   • Precordial pain               Plan:            ICD-10-CM ICD-9-CM   1. Palpitations R00.2 785.1   2. Hyperlipidemia, unspecified hyperlipidemia type E78.5 272.4   3. Benign essential hypertension I10 401.1     1. Palpitations  Palpitations under control    2. Hyperlipidemia, unspecified hyperlipidemia type  Continue current treatment    3. Benign essential hypertension  Change the blood pressure medication to Bystolic 5 mg from Ziac       CV STABLE    DC ZIAC    START BYSTOLIC 5 MG    SEE IN 1 MONTH  COUNSELING:    Bruna Price was given to patient for the following topics: diagnostic results, risk factor reductions, impressions, risks and benefits of treatment options and  importance of treatment compliance .       SMOKING COUNSELING:    [unfilled]    Dictated using Dragon dictation

## 2020-06-16 ENCOUNTER — TELEPHONE (OUTPATIENT)
Dept: CARDIOLOGY | Facility: CLINIC | Age: 60
End: 2020-06-16

## 2020-06-16 NOTE — TELEPHONE ENCOUNTER
----- Message from JERONIMO Linton sent at 6/15/2020  4:41 PM EDT -----  Please let her know that her monitor was a normal monitor study.  Thank you Branden

## 2020-06-17 ENCOUNTER — TELEPHONE (OUTPATIENT)
Dept: CARDIOLOGY | Facility: CLINIC | Age: 60
End: 2020-06-17

## 2020-06-17 NOTE — TELEPHONE ENCOUNTER
----- Message from Chelo Pantoja sent at 6/17/2020  8:08 AM EDT -----  Regarding: BYSTOLIC  Contact: [phone]  SHE WAS PRESCRIBED LAST WEEK SHE READ THE SIDE EFFECTS AND IT SAID NOT TO TAKE IF YOU HAVE THYROID ISSUES WHICH SHE DOES PLEASE ADVISE THANKS

## 2020-06-17 NOTE — TELEPHONE ENCOUNTER
Pt states she has had one thyroid removed and cyst on the other thyroid.    bystolic states should not take

## 2020-06-24 NOTE — TELEPHONE ENCOUNTER
Per Dr FAULKNER ok to take Bystolic on some occasions beta blocker helps thyroid   Pt verbalized understanding

## 2020-06-26 RX ORDER — NEBIVOLOL 5 MG/1
5 TABLET ORAL DAILY
Qty: 30 TABLET | Refills: 3 | Status: SHIPPED | OUTPATIENT
Start: 2020-06-26 | End: 2020-07-14 | Stop reason: SDUPTHER

## 2020-07-14 ENCOUNTER — OFFICE VISIT (OUTPATIENT)
Dept: FAMILY MEDICINE CLINIC | Facility: CLINIC | Age: 60
End: 2020-07-14

## 2020-07-14 VITALS
RESPIRATION RATE: 20 BRPM | TEMPERATURE: 96.9 F | OXYGEN SATURATION: 99 % | HEIGHT: 67 IN | SYSTOLIC BLOOD PRESSURE: 160 MMHG | WEIGHT: 202.4 LBS | HEART RATE: 58 BPM | BODY MASS INDEX: 31.77 KG/M2 | DIASTOLIC BLOOD PRESSURE: 120 MMHG

## 2020-07-14 DIAGNOSIS — F41.1 ANXIETY STATE: ICD-10-CM

## 2020-07-14 DIAGNOSIS — I10 BENIGN ESSENTIAL HYPERTENSION: Primary | ICD-10-CM

## 2020-07-14 DIAGNOSIS — L65.9 ALOPECIA: ICD-10-CM

## 2020-07-14 PROCEDURE — 99214 OFFICE O/P EST MOD 30 MIN: CPT | Performed by: INTERNAL MEDICINE

## 2020-07-14 RX ORDER — NEBIVOLOL 5 MG/1
5 TABLET ORAL DAILY
Qty: 7 TABLET | Refills: 0 | COMMUNITY
Start: 2020-07-14 | End: 2020-08-04 | Stop reason: SDUPTHER

## 2020-07-16 ENCOUNTER — OFFICE VISIT (OUTPATIENT)
Dept: CARDIOLOGY | Facility: CLINIC | Age: 60
End: 2020-07-16

## 2020-07-16 VITALS
SYSTOLIC BLOOD PRESSURE: 150 MMHG | BODY MASS INDEX: 31.71 KG/M2 | HEART RATE: 50 BPM | HEIGHT: 67 IN | WEIGHT: 202 LBS | DIASTOLIC BLOOD PRESSURE: 97 MMHG

## 2020-07-16 DIAGNOSIS — R00.2 PALPITATIONS: Primary | ICD-10-CM

## 2020-07-16 DIAGNOSIS — R07.2 PRECORDIAL PAIN: ICD-10-CM

## 2020-07-16 DIAGNOSIS — R53.83 OTHER FATIGUE: ICD-10-CM

## 2020-07-16 DIAGNOSIS — I10 BENIGN ESSENTIAL HYPERTENSION: ICD-10-CM

## 2020-07-16 PROCEDURE — 99213 OFFICE O/P EST LOW 20 MIN: CPT | Performed by: INTERNAL MEDICINE

## 2020-07-16 NOTE — PROGRESS NOTES
"1 month follow up   Subjective:        Bruna Jacobs is a 60 y.o. female who here for follow up    CC  Change from ziac to bystolic due to hair loss  HPI  60-year-old female with known history of the benign essential arterial hypertension, palpitations as well as atypical chest pain was switched from the Ziac to the Bystolic because of the hair loss patient has been taking the blood pressure medication Bystolic only for the last 3 days denies any chest pains or tightness in the chest     Problem List Items Addressed This Visit        Cardiovascular and Mediastinum    Benign essential hypertension    Palpitations - Primary       Nervous and Auditory    Precordial pain       Other    Fatigue        .    The following portions of the patient's history were reviewed and updated as appropriate: allergies, current medications, past family history, past medical history, past social history, past surgical history and problem list.    Past Medical History:   Diagnosis Date   • GERD (gastroesophageal reflux disease)    • Glaucoma    • Heart murmur    • Hyperlipidemia    • Hypertension      reports that she has never smoked. She has never used smokeless tobacco. She reports that she drinks alcohol. She reports that she does not use drugs.   Family History   Problem Relation Age of Onset   • Diabetes Mother    • Heart disease Mother    • Hypertension Mother    • Diabetes Father    • Hypertension Father    • Heart failure Father    • Heart disease Father    • Heart attack Neg Hx        Review of Systems  Constitutional: No wt loss, fever, fatigue  Gastrointestinal: No nausea, abdominal pain  Behavioral/Psych: No insomnia or anxiety   Cardiovascular no chest pains or tightness in the chest  Objective:       Physical Exam  /97 (BP Location: Left arm, Patient Position: Sitting)   Pulse 50   Ht 170.2 cm (67\")   Wt 91.6 kg (202 lb)   BMI 31.64 kg/m²   General appearance: No acute changes   Neck: Trachea midline; NECK, " supple, no thyromegaly or lymphadenopathy   Lungs: Normal size and shape, normal breath sounds, equal distribution of air, no rales and rhonchi   CV: S1-S2 regular, no murmurs, no rub, no gallop   Abdomen: Soft, non-tender; no masses , no abnormal abdominal sounds   Extremities: No deformity , normal color , no peripheral edema   Skin: Normal temperature, turgor and texture; no rash, ulcers          Procedures      Echocardiogram:        Current Outpatient Medications:   •  aspirin  MG tablet, Take 325 mg by mouth Every 6 (Six) Hours As Needed., Disp: , Rfl:   •  famotidine (PEPCID) 20 MG tablet, Take 20 mg by mouth 2 (Two) Times a Day., Disp: , Rfl:   •  Fexofenadine HCl (ALLEGRA ALLERGY PO), Take  by mouth., Disp: , Rfl:   •  lisinopril (PRINIVIL,ZESTRIL) 40 MG tablet, Take 40 mg by mouth Daily., Disp: , Rfl:   •  nebivolol (Bystolic) 5 MG tablet, Take 1 tablet by mouth Daily., Disp: 7 tablet, Rfl: 0  •  REPATHA PUSHTRONEX SYSTEM 420 MG/3.5ML solution cartridge, Inject  under the skin into the appropriate area as directed Every 28 (Twenty-Eight) Days., Disp: , Rfl:   •  timolol (TIMOPTIC) 0.5 % ophthalmic solution, Administer 1 drop to both eyes 2 (Two) Times a Day., Disp: , Rfl:    Assessment:        Patient Active Problem List   Diagnosis   • Benign essential hypertension   • Fatigue   • Left carotid bruit   • Palpitations   • Hyperlipidemia   • Palpitation   • Precordial pain   • Thyroid nodule               Plan:            ICD-10-CM ICD-9-CM   1. Palpitations R00.2 785.1   2. Benign essential hypertension I10 401.1   3. Precordial pain R07.2 786.51   4. Other fatigue R53.83 780.79     1. Palpitations  Palpitations are much better with beta-blockers    2. Benign essential hypertension  Blood pressures are better with the beta-blockers    3. Precordial pain  Atypical    4. Other fatigue  Multifactorial       Pt taking bystolic for only 3 days    See in 3 mmonths  COUNSELING:    Bruna Price  was given to patient for the following topics: diagnostic results, risk factor reductions, impressions, risks and benefits of treatment options and importance of treatment compliance .       SMOKING COUNSELING:    [unfilled]    Dictated using Dragon dictation

## 2020-07-31 ENCOUNTER — TELEPHONE (OUTPATIENT)
Dept: FAMILY MEDICINE CLINIC | Facility: CLINIC | Age: 60
End: 2020-07-31

## 2020-07-31 ENCOUNTER — OFFICE VISIT (OUTPATIENT)
Dept: FAMILY MEDICINE CLINIC | Facility: CLINIC | Age: 60
End: 2020-07-31

## 2020-07-31 VITALS
OXYGEN SATURATION: 98 % | WEIGHT: 201.6 LBS | DIASTOLIC BLOOD PRESSURE: 84 MMHG | HEIGHT: 67 IN | HEART RATE: 56 BPM | BODY MASS INDEX: 31.64 KG/M2 | SYSTOLIC BLOOD PRESSURE: 144 MMHG | TEMPERATURE: 97 F | RESPIRATION RATE: 16 BRPM

## 2020-07-31 DIAGNOSIS — Z00.00 GENERAL MEDICAL EXAM: Primary | ICD-10-CM

## 2020-07-31 DIAGNOSIS — I10 BENIGN ESSENTIAL HYPERTENSION: ICD-10-CM

## 2020-07-31 PROCEDURE — 93000 ELECTROCARDIOGRAM COMPLETE: CPT | Performed by: INTERNAL MEDICINE

## 2020-07-31 PROCEDURE — 99396 PREV VISIT EST AGE 40-64: CPT | Performed by: INTERNAL MEDICINE

## 2020-08-01 LAB — HCV AB S/CO SERPL IA: 0.2 S/CO RATIO (ref 0–0.9)

## 2020-08-04 DIAGNOSIS — I10 BENIGN ESSENTIAL HYPERTENSION: ICD-10-CM

## 2020-08-04 RX ORDER — NEBIVOLOL 5 MG/1
5 TABLET ORAL DAILY
Qty: 30 TABLET | Refills: 2 | Status: SHIPPED | OUTPATIENT
Start: 2020-08-04 | End: 2020-08-05 | Stop reason: SDUPTHER

## 2020-08-05 DIAGNOSIS — I10 BENIGN ESSENTIAL HYPERTENSION: ICD-10-CM

## 2020-08-05 RX ORDER — NEBIVOLOL 5 MG/1
5 TABLET ORAL DAILY
Qty: 14 TABLET | Refills: 0 | COMMUNITY
Start: 2020-08-05 | End: 2020-11-16

## 2020-08-05 NOTE — TELEPHONE ENCOUNTER
Received fax from pharmacy that the Bystolic is not covered by her insurance.  I informed pt that we have samples for her and that she can discuss the issue at her appt on 8/12/2020.

## 2020-08-12 ENCOUNTER — OFFICE VISIT (OUTPATIENT)
Dept: FAMILY MEDICINE CLINIC | Facility: CLINIC | Age: 60
End: 2020-08-12

## 2020-08-12 VITALS
SYSTOLIC BLOOD PRESSURE: 120 MMHG | WEIGHT: 201.4 LBS | RESPIRATION RATE: 20 BRPM | BODY MASS INDEX: 31.61 KG/M2 | HEIGHT: 67 IN | DIASTOLIC BLOOD PRESSURE: 92 MMHG | OXYGEN SATURATION: 98 % | TEMPERATURE: 97.9 F | HEART RATE: 47 BPM

## 2020-08-12 DIAGNOSIS — R00.1 BRADYCARDIA BY ELECTROCARDIOGRAM: ICD-10-CM

## 2020-08-12 DIAGNOSIS — I10 ESSENTIAL HYPERTENSION: Primary | ICD-10-CM

## 2020-08-12 PROCEDURE — 99214 OFFICE O/P EST MOD 30 MIN: CPT | Performed by: INTERNAL MEDICINE

## 2020-08-12 PROCEDURE — 93000 ELECTROCARDIOGRAM COMPLETE: CPT | Performed by: INTERNAL MEDICINE

## 2020-08-12 NOTE — PROGRESS NOTES
08/12/2020    CC: Hypertension  .        HPI  History of Present Illness     Subjective   Bruna Jacobs is a 60 y.o. female.      The following portions of the patient's history were reviewed and updated as appropriate: allergies, current medications, past family history, past medical history, past social history, past surgical history and problem list.    Problem List  Patient Active Problem List   Diagnosis   • Benign essential hypertension   • Fatigue   • Left carotid bruit   • Palpitations   • Hyperlipidemia   • Palpitation   • Precordial pain   • Thyroid nodule       Past Medical History  Past Medical History:   Diagnosis Date   • GERD (gastroesophageal reflux disease)    • Glaucoma    • Heart murmur    • Hyperlipidemia    • Hypertension        Surgical History  Past Surgical History:   Procedure Laterality Date   • CARDIAC CATHETERIZATION N/A 3/15/2019    Procedure: Left Heart Cath;  Surgeon: Graciela Boland MD;  Location:  [facility];  Service: Cardiology   • CARDIAC CATHETERIZATION N/A 3/15/2019    Procedure: Coronary angiography;  Surgeon: Graciela Boland MD;  Location:  [facility];  Service: Cardiology   • HYSTERECTOMY     • THYROIDECTOMY, PARTIAL         Family History  Family History   Problem Relation Age of Onset   • Diabetes Mother    • Heart disease Mother    • Hypertension Mother    • Diabetes Father    • Hypertension Father    • Heart failure Father    • Heart disease Father    • Heart attack Neg Hx        Social History  Social History    Tobacco Use      Smoking status: Never Smoker      Smokeless tobacco: Never Used       Is the Patient a current tobacco user? No    Allergies  Allergies   Allergen Reactions   • Atorvastatin Myalgia   • Niacin Unknown - Low Severity     CANNOT REMEMBER   • Rosuvastatin Myalgia   • Statins Myalgia       Current Medications    Current Outpatient Medications:   •  Alirocumab (Praluent) 150 MG/ML solution  auto-injector, Inject  under the skin into the appropriate area as directed Every 14 (Fourteen) Days., Disp: , Rfl:   •  aspirin  MG tablet, Take 325 mg by mouth Every 6 (Six) Hours As Needed., Disp: , Rfl:   •  famotidine (PEPCID) 20 MG tablet, Take 20 mg by mouth 2 (Two) Times a Day., Disp: , Rfl:   •  Fexofenadine HCl (ALLEGRA ALLERGY PO), Take  by mouth., Disp: , Rfl:   •  lisinopril (PRINIVIL,ZESTRIL) 40 MG tablet, Take 40 mg by mouth Daily., Disp: , Rfl:   •  nebivolol (Bystolic) 5 MG tablet, Take 1 tablet by mouth Daily., Disp: 14 tablet, Rfl: 0  •  timolol (TIMOPTIC) 0.5 % ophthalmic solution, Administer 1 drop to both eyes 2 (Two) Times a Day., Disp: , Rfl:   •  REPATHA PUSHTRONEX SYSTEM 420 MG/3.5ML solution cartridge, Inject  under the skin into the appropriate area as directed Every 28 (Twenty-Eight) Days., Disp: , Rfl:      Review of System  Review of Systems  I have reviewed and confirmed the accuracy of the ROS as documented by the MA/LPN/RN Edward Hawk MD    Vitals:    08/12/20 0758   BP: 120/92   Pulse: (!) 47   Resp: 20   Temp: 97.9 °F (36.6 °C)   SpO2: 98%     Body mass index is 31.54 kg/m².    Objective     Office Visit on 07/31/2020   Component Date Value Ref Range Status   • Hep C Virus Ab 07/31/2020 0.2  0.0 - 0.9 s/co ratio Final    Comment:                                   Negative:     < 0.8                               Indeterminate: 0.8 - 0.9                                    Positive:     > 0.9   The CDC recommends that a positive HCV antibody result   be followed up with a HCV Nucleic Acid Amplification   test ([id number]).         Physical Exam  Physical Exam    Assessment/Plan   Bruna was seen today for hypertension.    Diagnoses and all orders for this visit:    Essential hypertension: Controlled    Bradycardia by electrocardiogram: Asymptomatic      Plan #1.)  Hold Bystolic for 2 days  2.)  Decrease Bystolic 2.5 mg 1 tab p.o. daily  3.)  Follow-up in 2 to 3 weeks for  reevaluation of hypertension.       Edward Hawk MD  08/12/2020

## 2020-08-23 NOTE — PROGRESS NOTES
07/31/2020    CC: Annual Exam  .        HPI  History of Present Illness     Subjective   Bruna Jacobs is a 60 y.o. female.      The following portions of the patient's history were reviewed and updated as appropriate: allergies, current medications, past family history, past medical history, past social history, past surgical history and problem list.    Problem List  Patient Active Problem List   Diagnosis   • Benign essential hypertension   • Fatigue   • Left carotid bruit   • Palpitations   • Hyperlipidemia   • Palpitation   • Precordial pain   • Thyroid nodule       Past Medical History  Past Medical History:   Diagnosis Date   • GERD (gastroesophageal reflux disease)    • Glaucoma    • Heart murmur    • Hyperlipidemia    • Hypertension        Surgical History  Past Surgical History:   Procedure Laterality Date   • CARDIAC CATHETERIZATION N/A 3/15/2019    Procedure: Left Heart Cath;  Surgeon: Graciela Boland MD;  Location:  [facility];  Service: Cardiology   • CARDIAC CATHETERIZATION N/A 3/15/2019    Procedure: Coronary angiography;  Surgeon: Graciela Boland MD;  Location:  [facility];  Service: Cardiology   • HYSTERECTOMY     • THYROIDECTOMY, PARTIAL         Family History  Family History   Problem Relation Age of Onset   • Diabetes Mother    • Heart disease Mother    • Hypertension Mother    • Diabetes Father    • Hypertension Father    • Heart failure Father    • Heart disease Father    • Heart attack Neg Hx        Social History  Social History    Tobacco Use      Smoking status: Never Smoker      Smokeless tobacco: Never Used       Is the Patient a current tobacco user? No    Allergies  Allergies   Allergen Reactions   • Atorvastatin Myalgia   • Niacin Unknown - Low Severity     CANNOT REMEMBER   • Rosuvastatin Myalgia   • Statins Myalgia       Current Medications    Current Outpatient Medications:   •  aspirin  MG tablet, Take 325 mg by mouth Every  6 (Six) Hours As Needed., Disp: , Rfl:   •  famotidine (PEPCID) 20 MG tablet, Take 20 mg by mouth 2 (Two) Times a Day., Disp: , Rfl:   •  Fexofenadine HCl (ALLEGRA ALLERGY PO), Take  by mouth., Disp: , Rfl:   •  lisinopril (PRINIVIL,ZESTRIL) 40 MG tablet, Take 40 mg by mouth Daily., Disp: , Rfl:   •  REPATHA PUSHTRONEX SYSTEM 420 MG/3.5ML solution cartridge, Inject  under the skin into the appropriate area as directed Every 28 (Twenty-Eight) Days., Disp: , Rfl:   •  timolol (TIMOPTIC) 0.5 % ophthalmic solution, Administer 1 drop to both eyes 2 (Two) Times a Day., Disp: , Rfl:   •  Alirocumab (Praluent) 150 MG/ML solution auto-injector, Inject  under the skin into the appropriate area as directed Every 14 (Fourteen) Days., Disp: , Rfl:   •  nebivolol (Bystolic) 5 MG tablet, Take 1 tablet by mouth Daily., Disp: 14 tablet, Rfl: 0     Review of System  Review of Systems   Constitutional: Negative.    HENT: Negative.    Eyes: Negative.    Respiratory: Negative.    Cardiovascular: Negative.    Gastrointestinal: Negative.    Musculoskeletal: Negative.    Skin: Negative.    Psychiatric/Behavioral: Negative.      I have reviewed and confirmed the accuracy of the ROS as documented by the MA/LPN/RN Edward Hawk MD    Vitals:    07/31/20 0952   BP: 144/84   Pulse:    Resp:    Temp:    SpO2:      Body mass index is 31.58 kg/m².    Objective     Office Visit on 07/31/2020   Component Date Value Ref Range Status   • Hep C Virus Ab 07/31/2020 0.2  0.0 - 0.9 s/co ratio Final    Comment:                                   Negative:     < 0.8                               Indeterminate: 0.8 - 0.9                                    Positive:     > 0.9   The CDC recommends that a positive HCV antibody result   be followed up with a HCV Nucleic Acid Amplification   test ([id number]).         Physical Exam  Physical Exam   Constitutional: She is oriented to person, place, and time. She appears well-developed and well-nourished.   Eyes:  Conjunctivae and EOM are normal.   Neck: Normal range of motion. Neck supple.   Cardiovascular: Normal rate, regular rhythm and normal heart sounds.   Pulmonary/Chest: Effort normal and breath sounds normal.   Abdominal: Soft. Bowel sounds are normal.   Musculoskeletal: Normal range of motion.   Neurological: She is alert and oriented to person, place, and time.   Skin: Skin is warm and dry.   Psychiatric: She has a normal mood and affect. Her behavior is normal.   Nursing note and vitals reviewed.      Assessment/Plan   Bruna was seen today for annual exam.    Diagnoses and all orders for this visit:    General medical exam  -     Lipid panel  -     Cancel: Hepatitis C antibody  -     Cancel: Tdap Vaccine Greater Than or Equal To 8yo IM  -     Ambulatory Referral For Screening Colonoscopy  -     Hepatitis C antibody  -     Tdap Vaccine Greater Than or Equal To 8yo IM; Future    Benign essential hypertension  -     ECG 12 Lead             Edward Hawk MD  07/31/2020

## 2020-09-01 ENCOUNTER — OFFICE VISIT (OUTPATIENT)
Dept: FAMILY MEDICINE CLINIC | Facility: CLINIC | Age: 60
End: 2020-09-01

## 2020-09-01 VITALS
HEART RATE: 51 BPM | BODY MASS INDEX: 30.83 KG/M2 | RESPIRATION RATE: 16 BRPM | DIASTOLIC BLOOD PRESSURE: 94 MMHG | OXYGEN SATURATION: 99 % | SYSTOLIC BLOOD PRESSURE: 154 MMHG | HEIGHT: 67 IN | WEIGHT: 196.4 LBS | TEMPERATURE: 97.1 F

## 2020-09-01 DIAGNOSIS — F41.1 ANXIETY STATE: ICD-10-CM

## 2020-09-01 DIAGNOSIS — I10 BENIGN ESSENTIAL HYPERTENSION: ICD-10-CM

## 2020-09-01 DIAGNOSIS — I10 ESSENTIAL HYPERTENSION: Primary | ICD-10-CM

## 2020-09-01 PROCEDURE — 99213 OFFICE O/P EST LOW 20 MIN: CPT | Performed by: INTERNAL MEDICINE

## 2020-09-01 RX ORDER — LORAZEPAM 0.5 MG/1
0.5 TABLET ORAL NIGHTLY PRN
Qty: 10 TABLET | Refills: 0 | Status: SHIPPED | OUTPATIENT
Start: 2020-09-01 | End: 2020-10-27

## 2020-09-01 NOTE — PROGRESS NOTES
09/01/2020    CC: Hypertension (follow up)  .        HPI  Hypertension   This is a recurrent problem. The current episode started in the past 7 days. The problem has been gradually improving since onset. Associated symptoms include anxiety. There are no associated agents to hypertension.        Ronak Jacobs is a 60 y.o. female.      The following portions of the patient's history were reviewed and updated as appropriate: allergies, current medications, past family history, past medical history, past social history, past surgical history and problem list.    Problem List  Patient Active Problem List   Diagnosis   • Benign essential hypertension   • Fatigue   • Left carotid bruit   • Palpitations   • Hyperlipidemia   • Palpitation   • Precordial pain   • Thyroid nodule       Past Medical History  Past Medical History:   Diagnosis Date   • GERD (gastroesophageal reflux disease)    • Glaucoma    • Heart murmur    • Hyperlipidemia    • Hypertension        Surgical History  Past Surgical History:   Procedure Laterality Date   • CARDIAC CATHETERIZATION N/A 3/15/2019    Procedure: Left Heart Cath;  Surgeon: Graciela Boland MD;  Location: [facility] CATH INVASIVE LOCATION;  Service: Cardiology   • CARDIAC CATHETERIZATION N/A 3/15/2019    Procedure: Coronary angiography;  Surgeon: Graciela Boland MD;  Location:  [facility] CATH INVASIVE LOC[facility];  Service: Cardiology   • HYSTERECTOMY     • THYROIDECTOMY, PARTIAL         Family History  Family History   Problem Relation Age of Onset   • Diabetes Mother    • Heart disease Mother    • Hypertension Mother    • Diabetes Father    • Hypertension Father    • Heart failure Father    • Heart disease Father    • Heart attack Neg Hx        Social History  Social History    Tobacco Use      Smoking status: Never Smoker      Smokeless tobacco: Never Used       Is the Patient a current tobacco user? No    Allergies  Allergies   Allergen Reactions   • Atorvastatin  Myalgia   • Niacin Unknown - Low Severity     CANNOT REMEMBER   • Rosuvastatin Myalgia   • Statins Myalgia       Current Medications    Current Outpatient Medications:   •  Alirocumab (Praluent) 150 MG/ML solution auto-injector, Inject  under the skin into the appropriate area as directed Every 14 (Fourteen) Days., Disp: , Rfl:   •  aspirin  MG tablet, Take 325 mg by mouth Every 6 (Six) Hours As Needed., Disp: , Rfl:   •  famotidine (PEPCID) 20 MG tablet, Take 20 mg by mouth 2 (Two) Times a Day., Disp: , Rfl:   •  Fexofenadine HCl (ALLEGRA ALLERGY PO), Take  by mouth., Disp: , Rfl:   •  lisinopril (PRINIVIL,ZESTRIL) 40 MG tablet, Take 40 mg by mouth Daily., Disp: , Rfl:   •  nebivolol (Bystolic) 5 MG tablet, Take 1 tablet by mouth Daily. (Patient taking differently: Take 5 mg by mouth Daily. 1/2tab qd), Disp: 14 tablet, Rfl: 0  •  timolol (TIMOPTIC) 0.5 % ophthalmic solution, Administer 1 drop to both eyes 2 (Two) Times a Day., Disp: , Rfl:      Review of System  Review of Systems   Constitutional: Negative.    HENT: Negative.    Eyes: Negative.    Respiratory: Negative.    Cardiovascular: Negative.    Gastrointestinal: Negative.    Psychiatric/Behavioral: The patient is nervous/anxious.      I have reviewed and confirmed the accuracy of the ROS as documented by the MA/LPN/RN Edward Hwak MD    Vitals:    09/01/20 0808   BP: 154/94   Pulse: 51   Resp: 16   Temp: 97.1 °F (36.2 °C)   SpO2: 99%     Body mass index is 30.76 kg/m².    Objective     No visits with results within 30 Day(s) from this visit.   Latest known visit with results is:   Office Visit on 07/31/2020   Component Date Value Ref Range Status   • Hep C Virus Ab 07/31/2020 0.2  0.0 - 0.9 s/co ratio Final    Comment:                                   Negative:     < 0.8                               Indeterminate: 0.8 - 0.9                                    Positive:     > 0.9   The CDC recommends that a positive HCV antibody result   be  followed up with a HCV Nucleic Acid Amplification   test ([id number]).         Physical Exam  Physical Exam   Constitutional: She is oriented to person, place, and time. She appears well-developed and well-nourished.   Eyes: EOM are normal.   Cardiovascular: Normal rate, regular rhythm and normal heart sounds.   Pulmonary/Chest: Effort normal and breath sounds normal.   Musculoskeletal: Normal range of motion.   Neurological: She is alert and oriented to person, place, and time.   Skin: Skin is warm and dry.   Psychiatric: She has a normal mood and affect. Her behavior is normal.   Nursing note and vitals reviewed.      Assessment/Plan    This patient presents for follow-up of hypertension.  With the last visit because of some bradycardia and her relative normal blood pressure we decreased her Bystolic.  The patient inadvertently decreased the Bystolic and her lisinopril.  Relates that she has been under more stress under the past few days because of some family issues and problems.  She relates that her sleep has been very disruptive.  He denies any headache nausea vomiting or chest pain.    We discussed with the patient resuming her lisinopril at its full dose of 40 mg.  We will continue her at Bystolic 2.5 mg and we will add Lorazepam for a short course of anxiety.      Bruna was seen today for hypertension.    Diagnoses and all orders for this visit:    Essential hypertension: Not controlled    Anxiety state: Episodic      Plan  1.)  Resume lisinopril at 40 mg 1 daily.  2.)  Continue Bystolic 2.5 mg 1 tab p.o. daily.  3.)  Lorazepam 0.5 mg 1 tablet nightly x3 days and then as needed.  Will dispense 10 only.  4.)  We will consider SSRI if we need to use more Lorazepam.  5.)  Follow-up in 2 to 3 weeks       Edward Hawk MD  09/01/2020

## 2020-09-22 ENCOUNTER — OFFICE VISIT (OUTPATIENT)
Dept: FAMILY MEDICINE CLINIC | Facility: CLINIC | Age: 60
End: 2020-09-22

## 2020-09-22 VITALS
TEMPERATURE: 96.8 F | HEART RATE: 48 BPM | RESPIRATION RATE: 16 BRPM | WEIGHT: 197.2 LBS | OXYGEN SATURATION: 100 % | BODY MASS INDEX: 30.95 KG/M2 | SYSTOLIC BLOOD PRESSURE: 162 MMHG | DIASTOLIC BLOOD PRESSURE: 100 MMHG | HEIGHT: 67 IN

## 2020-09-22 DIAGNOSIS — Z91.119 NONCOMPLIANCE WITH DIETARY RESTRICTION: ICD-10-CM

## 2020-09-22 DIAGNOSIS — F41.9 ANXIETY: ICD-10-CM

## 2020-09-22 DIAGNOSIS — E78.5 HYPERLIPIDEMIA, UNSPECIFIED HYPERLIPIDEMIA TYPE: Primary | ICD-10-CM

## 2020-09-22 DIAGNOSIS — I10 ESSENTIAL HYPERTENSION: ICD-10-CM

## 2020-09-22 PROCEDURE — 99214 OFFICE O/P EST MOD 30 MIN: CPT | Performed by: INTERNAL MEDICINE

## 2020-09-22 RX ORDER — ESCITALOPRAM OXALATE 10 MG/1
10 TABLET ORAL DAILY
Qty: 30 TABLET | Refills: 2 | Status: SHIPPED | OUTPATIENT
Start: 2020-09-22 | End: 2020-10-27 | Stop reason: SDUPTHER

## 2020-09-22 NOTE — PROGRESS NOTES
09/22/2020    CC: Hypertension (follow up)  .        HPI  Hypertension  This is a chronic problem. The current episode started more than 1 year ago. The problem is unchanged. The problem is uncontrolled. Associated symptoms include anxiety. Pertinent negatives include no chest pain or headaches. There are no associated agents to hypertension. Risk factors for coronary artery disease include dyslipidemia. Past treatments include beta blockers, ACE inhibitors, calcium channel blockers, diuretics and angiotensin blockers. Current antihypertension treatment includes ACE inhibitors and beta blockers. The current treatment provides moderate improvement. Compliance problems include diet, medication cost and medication side effects.         Ronak Jacobs is a 60 y.o. female.      The following portions of the patient's history were reviewed and updated as appropriate: allergies, current medications, past family history, past medical history, past social history, past surgical history and problem list.    Problem List  Patient Active Problem List   Diagnosis   • Benign essential hypertension   • Fatigue   • Left carotid bruit   • Palpitations   • Hyperlipidemia   • Palpitation   • Precordial pain   • Thyroid nodule       Past Medical History  Past Medical History:   Diagnosis Date   • GERD (gastroesophageal reflux disease)    • Glaucoma    • Heart murmur    • Hyperlipidemia    • Hypertension        Surgical History  Past Surgical History:   Procedure Laterality Date   • CARDIAC CATHETERIZATION N/A 3/15/2019    Procedure: Left Heart Cath;  Surgeon: Graciela Boland MD;  Location:  [facility];  Service: Cardiology   • CARDIAC CATHETERIZATION N/A 3/15/2019    Procedure: Coronary angiography;  Surgeon: Graciela Boland MD;  Location:  [facility]ATH INVASIVE LOC[facility];  Service: Cardiology   • HYSTERECTOMY     • THYROIDECTOMY, PARTIAL         Family History  Family History   Problem  Relation Age of Onset   • Diabetes Mother    • Heart disease Mother    • Hypertension Mother    • Diabetes Father    • Hypertension Father    • Heart failure Father    • Heart disease Father    • Heart attack Neg Hx        Social History  Social History    Tobacco Use      Smoking status: Never Smoker      Smokeless tobacco: Never Used       Is the Patient a current tobacco user? No    Allergies  Allergies   Allergen Reactions   • Atorvastatin Myalgia   • Niacin Unknown - Low Severity     CANNOT REMEMBER   • Rosuvastatin Myalgia   • Statins Myalgia       Current Medications    Current Outpatient Medications:   •  Alirocumab (Praluent) 150 MG/ML solution auto-injector, Inject  under the skin into the appropriate area as directed Every 14 (Fourteen) Days., Disp: , Rfl:   •  aspirin  MG tablet, Take 325 mg by mouth Every 6 (Six) Hours As Needed., Disp: , Rfl:   •  famotidine (PEPCID) 20 MG tablet, Take 20 mg by mouth 2 (Two) Times a Day., Disp: , Rfl:   •  Fexofenadine HCl (ALLEGRA ALLERGY PO), Take  by mouth., Disp: , Rfl:   •  lisinopril (PRINIVIL,ZESTRIL) 40 MG tablet, Take 40 mg by mouth Daily., Disp: , Rfl:   •  LORazepam (Ativan) 0.5 MG tablet, Take 1 tablet by mouth At Night As Needed for Anxiety., Disp: 10 tablet, Rfl: 0  •  nebivolol (Bystolic) 5 MG tablet, Take 1 tablet by mouth Daily. (Patient taking differently: Take 5 mg by mouth Daily. 1/2tab qd), Disp: 14 tablet, Rfl: 0  •  timolol (TIMOPTIC) 0.5 % ophthalmic solution, Administer 1 drop to both eyes 2 (Two) Times a Day., Disp: , Rfl:   •  escitalopram (Lexapro) 10 MG tablet, Take 1 tablet by mouth Daily., Disp: 30 tablet, Rfl: 2     Review of System  Review of Systems   Constitutional: Negative.    HENT: Negative.    Eyes: Negative.    Respiratory: Negative.    Cardiovascular: Negative.  Negative for chest pain.   Skin: Negative.    Psychiatric/Behavioral: The patient is nervous/anxious.      I have reviewed and confirmed the accuracy of the ROS as  documented by the MA/LPN/RN Edward Hawk MD    Vitals:    09/22/20 0902   BP: 162/100   Pulse: (!) 48   Resp: 16   Temp: 96.8 °F (36 °C)   SpO2: 100%     Body mass index is 30.89 kg/m².    Objective     No visits with results within 30 Day(s) from this visit.   Latest known visit with results is:   Office Visit on 07/31/2020   Component Date Value Ref Range Status   • Hep C Virus Ab 07/31/2020 0.2  0.0 - 0.9 s/co ratio Final    Comment:                                   Negative:     < 0.8                               Indeterminate: 0.8 - 0.9                                    Positive:     > 0.9   The CDC recommends that a positive HCV antibody result   be followed up with a HCV Nucleic Acid Amplification   test ([id number]).         Physical Exam  Physical Exam  Cardiovascular:      Rate and Rhythm: Normal rate and regular rhythm.      Pulses: Normal pulses.      Heart sounds: Normal heart sounds.   Pulmonary:      Effort: Pulmonary effort is normal.      Breath sounds: Normal breath sounds.   Neurological:      Mental Status: She is alert.         Assessment/Plan      This patient presents today for follow-up of hypertension.  Unfortunately she had a high sodium meal last night which is probably reflected in her blood pressure this morning.  Patient has been on a number of antihypertensive agents in the past however diastolic is been the most effective.  While she was taking this her blood pressure was well controlled but she could not afford it and we were able to give her samples.  However the sample supply now is dry and the patient relates she cannot afford the medication again.  She was previously on lisinopril and HCTZ combination however she relates that there was some alopecia associated with this and she wanted to stop taking the HCTZ.      Patient also has significant history of anxiety that has been well treated with lorazepam however as we previously discussed with her this is not a good long-term  solution and we will switch her to Lexapro at this time.    Patient's blood pressure in the office today by me was 160/90 in the left arm sitting position standard cuff.          Bruna was seen today for hypertension.    Diagnoses and all orders for this visit:    Hyperlipidemia, unspecified hyperlipidemia type: Status unknown, patient's last lipid profile done 8 months ago was within normal limits.  -     Comprehensive metabolic panel  -     Lipid Panel w/ Chol/HDL Ratio    Essential hypertension: Poorly controlled  -     Ambulatory Referral to Nephrology    Anxiety: Controlled, medication change needed    Noncompliance with dietary restriction: Remains a problem    Other orders  -     escitalopram (Lexapro) 10 MG tablet; Take 1 tablet by mouth Daily.    Plan:  1.)  Comprehensive metabolic panel  2.)  Lipid profile  3.)  DC lorazepam  4.)  Start Lexapro 10 mg 1 tab p.o. every morning  5.)  Consultation to nephrology for recommendations on blood pressure regimen  6.)  Follow-up in 3 to 4 weeks for evaluation of Lexapro.         Ewdard Hawk MD  09/22/2020

## 2020-09-23 ENCOUNTER — TELEPHONE (OUTPATIENT)
Dept: FAMILY MEDICINE CLINIC | Facility: CLINIC | Age: 60
End: 2020-09-23

## 2020-09-23 LAB
ALBUMIN SERPL-MCNC: 4.7 G/DL (ref 3.5–5.2)
ALBUMIN/GLOB SERPL: 2.4 G/DL
ALP SERPL-CCNC: 74 U/L (ref 39–117)
ALT SERPL-CCNC: 16 U/L (ref 1–33)
AST SERPL-CCNC: 18 U/L (ref 1–32)
BILIRUB SERPL-MCNC: 0.4 MG/DL (ref 0–1.2)
BUN SERPL-MCNC: 7 MG/DL (ref 8–23)
BUN/CREAT SERPL: 10.4 (ref 7–25)
CALCIUM SERPL-MCNC: 9 MG/DL (ref 8.6–10.5)
CHLORIDE SERPL-SCNC: 103 MMOL/L (ref 98–107)
CHOLEST SERPL-MCNC: 242 MG/DL (ref 0–200)
CHOLEST/HDLC SERPL: 4.25 {RATIO}
CO2 SERPL-SCNC: 27.9 MMOL/L (ref 22–29)
CREAT SERPL-MCNC: 0.67 MG/DL (ref 0.57–1)
GLOBULIN SER CALC-MCNC: 2 GM/DL
GLUCOSE SERPL-MCNC: 91 MG/DL (ref 65–99)
HDLC SERPL-MCNC: 57 MG/DL (ref 40–60)
LDLC SERPL CALC-MCNC: 164 MG/DL (ref 0–100)
POTASSIUM SERPL-SCNC: 4.6 MMOL/L (ref 3.5–5.2)
PROT SERPL-MCNC: 6.7 G/DL (ref 6–8.5)
SODIUM SERPL-SCNC: 138 MMOL/L (ref 136–145)
TRIGL SERPL-MCNC: 106 MG/DL (ref 0–150)
VLDLC SERPL CALC-MCNC: 21.2 MG/DL

## 2020-09-23 NOTE — TELEPHONE ENCOUNTER
PATIENT CALLED IN CHECK IF IT IS OK FOR HER TO START HER NEW MEDICATION AND HER BLOOD PRESSURE MEDICATION. THE NEW MEDICATION IS ESCITOPRAM.  PATIENT STATES THAT HER BLOOD PRESSURE IS VERY HIGH TODAY. THE READING AT 8 AM /93      PLEASE ADVISE    [phone]

## 2020-09-23 NOTE — TELEPHONE ENCOUNTER
There is regarding a telephone call with Ms. Jacobs.  The patient was recently started on Lexapro 10 mg 1 tab p.o. daily for anxiety.  But her blood pressure remains elevated.  She is previously been on Bystolic/HCTZ but this was discontinued because of the patient's complaint about hair loss.  The patient instead has been taking 2.5 mg of Bystolic and this is not adequate to control her blood pressure.  As these are unusual events and there are protests plan that may disrupt her ability to get medication even new prescriptions she has agreed to go back and take the Bystolic/6.25 that she had in the past.  We will do this for 1 week and then reevaluate.    Patient's other concern was about getting in a follow-up appointment.  We will discuss with the front and try to get her in within a week.    Summary patient will take Bystolic 10 points 10 mg / 6.25 mg HCTZ 1 tablet p.o. every morning x1 week along with the Lexapro 10 mg 1 tab p.o. daily and we will see her back again in 1 week.  Note consultation is pending for nephrology.

## 2020-10-02 ENCOUNTER — OFFICE VISIT (OUTPATIENT)
Dept: FAMILY MEDICINE CLINIC | Facility: CLINIC | Age: 60
End: 2020-10-02

## 2020-10-02 VITALS
RESPIRATION RATE: 16 BRPM | BODY MASS INDEX: 29.88 KG/M2 | WEIGHT: 190.4 LBS | DIASTOLIC BLOOD PRESSURE: 86 MMHG | HEART RATE: 48 BPM | SYSTOLIC BLOOD PRESSURE: 140 MMHG | HEIGHT: 67 IN | TEMPERATURE: 96 F | OXYGEN SATURATION: 100 %

## 2020-10-02 DIAGNOSIS — F41.9 ANXIETY: ICD-10-CM

## 2020-10-02 DIAGNOSIS — I10 ESSENTIAL HYPERTENSION: Primary | ICD-10-CM

## 2020-10-02 DIAGNOSIS — R00.1 BRADYCARDIA BY ELECTROCARDIOGRAM: ICD-10-CM

## 2020-10-02 PROCEDURE — 99214 OFFICE O/P EST MOD 30 MIN: CPT | Performed by: INTERNAL MEDICINE

## 2020-10-02 PROCEDURE — 93000 ELECTROCARDIOGRAM COMPLETE: CPT | Performed by: INTERNAL MEDICINE

## 2020-10-02 NOTE — PROGRESS NOTES
10/02/2020    CC: Hypertension (follow up)  .        HPI  Hypertension  This is a chronic problem. The current episode started more than 1 year ago. The problem has been rapidly improving since onset. The problem is uncontrolled. Associated symptoms include anxiety and malaise/fatigue. Past treatments include ACE inhibitors, calcium channel blockers and beta blockers.        Ronak Jacobs is a 60 y.o. female.      The following portions of the patient's history were reviewed and updated as appropriate: allergies, current medications, past family history, past medical history, past social history, past surgical history and problem list.    Problem List  Patient Active Problem List   Diagnosis   • Benign essential hypertension   • Fatigue   • Left carotid bruit   • Palpitations   • Hyperlipidemia   • Palpitation   • Precordial pain   • Thyroid nodule       Past Medical History  Past Medical History:   Diagnosis Date   • GERD (gastroesophageal reflux disease)    • Glaucoma    • Heart murmur    • Hyperlipidemia    • Hypertension        Surgical History  Past Surgical History:   Procedure Laterality Date   • CARDIAC CATHETERIZATION N/A 3/15/2019    Procedure: Left Heart Cath;  Surgeon: Graciela Boland MD;  Location: [facility] CATH INVASIVE LOCATION;  Service: Cardiology   • CARDIAC CATHETERIZATION N/A 3/15/2019    Procedure: Coronary angiography;  Surgeon: Graciela Boland MD;  Location:  [facility] CATH INVASIVE LOCATION;  Service: Cardiology   • HYSTERECTOMY     • THYROIDECTOMY, PARTIAL         Family History  Family History   Problem Relation Age of Onset   • Diabetes Mother    • Heart disease Mother    • Hypertension Mother    • Diabetes Father    • Hypertension Father    • Heart failure Father    • Heart disease Father    • Heart attack Neg Hx        Social History  Social History    Tobacco Use      Smoking status: Never Smoker      Smokeless tobacco: Never Used       Is the Patient a current  tobacco user? No    Allergies  Allergies   Allergen Reactions   • Atorvastatin Myalgia   • Niacin Unknown - Low Severity     CANNOT REMEMBER   • Rosuvastatin Myalgia   • Statins Myalgia       Current Medications    Current Outpatient Medications:   •  Alirocumab (Praluent) 150 MG/ML solution auto-injector, Inject  under the skin into the appropriate area as directed Every 14 (Fourteen) Days., Disp: , Rfl:   •  aspirin  MG tablet, Take 325 mg by mouth Every 6 (Six) Hours As Needed., Disp: , Rfl:   •  escitalopram (Lexapro) 10 MG tablet, Take 1 tablet by mouth Daily., Disp: 30 tablet, Rfl: 2  •  famotidine (PEPCID) 20 MG tablet, Take 20 mg by mouth 2 (Two) Times a Day., Disp: , Rfl:   •  Fexofenadine HCl (ALLEGRA ALLERGY PO), Take  by mouth., Disp: , Rfl:   •  lisinopril (PRINIVIL,ZESTRIL) 40 MG tablet, Take 40 mg by mouth Daily., Disp: , Rfl:   •  LORazepam (Ativan) 0.5 MG tablet, Take 1 tablet by mouth At Night As Needed for Anxiety., Disp: 10 tablet, Rfl: 0  •  nebivolol (Bystolic) 5 MG tablet, Take 1 tablet by mouth Daily. (Patient taking differently: Take 5 mg by mouth Daily. 1/2tab qd), Disp: 14 tablet, Rfl: 0  •  timolol (TIMOPTIC) 0.5 % ophthalmic solution, Administer 1 drop to both eyes 2 (Two) Times a Day., Disp: , Rfl:      Review of System  Review of Systems   Constitutional: Positive for fatigue, malaise/fatigue and unexpected weight loss.   Eyes: Negative.    Respiratory: Negative.    Cardiovascular: Negative.    Gastrointestinal: Negative.    Endocrine: Negative.    Musculoskeletal: Negative.    Psychiatric/Behavioral:        Anxiety state     I have reviewed and confirmed the accuracy of the ROS as documented by the MA/LPN/RN Edward Hawk MD    Vitals:    10/02/20 0849   BP: 140/86   Pulse: (!) 48   Resp: 16   Temp: 96 °F (35.6 °C)   SpO2: 100%     Body mass index is 29.82 kg/m².    Objective     Office Visit on 09/22/2020   Component Date Value Ref Range Status   • Glucose 09/22/2020 91  65  - 99 mg/dL Final   • BUN 09/22/2020 7* 8 - 23 mg/dL Final   • Creatinine 09/22/2020 0.67  0.57 - 1.00 mg/dL Final   • eGFR Non  Am 09/22/2020 89  >60 mL/min/1.73 Final   • eGFR African Am 09/22/2020 108  >60 mL/min/1.73 Final   • BUN/Creatinine Ratio 09/22/2020 10.4  7.0 - 25.0 Final   • Sodium 09/22/2020 138  136 - 145 mmol/L Final   • Potassium 09/22/2020 4.6  3.5 - 5.2 mmol/L Final   • Chloride 09/22/2020 103  98 - 107 mmol/L Final   • Total CO2 09/22/2020 27.9  22.0 - 29.0 mmol/L Final   • Calcium 09/22/2020 9.0  8.6 - 10.5 mg/dL Final   • Total Protein 09/22/2020 6.7  6.0 - 8.5 g/dL Final   • Albumin 09/22/2020 4.70  3.50 - 5.20 g/dL Final   • Globulin 09/22/2020 2.0  gm/dL Final   • A/G Ratio 09/22/2020 2.4  g/dL Final   • Total Bilirubin 09/22/2020 0.4  0.0 - 1.2 mg/dL Final   • Alkaline Phosphatase 09/22/2020 74  39 - 117 U/L Final   • AST (SGOT) 09/22/2020 18  1 - 32 U/L Final   • ALT (SGPT) 09/22/2020 16  1 - 33 U/L Final   • Total Cholesterol 09/22/2020 242* 0 - 200 mg/dL Final   • Triglycerides 09/22/2020 106  0 - 150 mg/dL Final   • HDL Cholesterol 09/22/2020 57  40 - 60 mg/dL Final   • VLDL Cholesterol Franky 09/22/2020 21.2  mg/dL Final   • LDL Chol Calc (NIH) 09/22/2020 164* 0 - 100 mg/dL Final   • Chol/HDL Ratio 09/22/2020 4.25   Final       Physical Exam  Physical Exam  Cardiovascular:      Rate and Rhythm: Regular rhythm. Bradycardia present.   Pulmonary:      Effort: Pulmonary effort is normal.      Breath sounds: Normal breath sounds.   Abdominal:      General: Abdomen is flat.      Palpations: Abdomen is soft.   Musculoskeletal: Normal range of motion.   Skin:     General: Skin is warm.   Neurological:      General: No focal deficit present.      Mental Status: She is oriented to person, place, and time.         Assessment/Plan      This patient presents for follow-up of anxiety state.  She relates that she is feeling much better now she is much calmer she relates.  Is also able to sleep  all throughout the night.  Patient has noticed unusual tiredness and weakness especially in the afternoon.  With her last visit we started her on Lexapro 10 mg p.o. every morning and we restarted her Bystolic/HCTZ.    Patient relates that she has not had her lisinopril that she usually takes around 3:00 in the afternoon.  And this coincides with the time she begins to feel unusually tired and weak and sleepy.  Note is made that her blood pressure rechecked by me in the left arm sitting position standard cuff is 134/80.  But her pulse is 45.  There are no murmurs appreciated.    All in all she is made significant improvement with her anxiety and her blood pressure is now within normal limits although she has developed some bradycardia I think secondary to the diastolic component of her medicines.  Is also lost 7 pounds from the last visit there is no doubt is due to the HCTZ component of the Bystolic combination agent.        Bruna was seen today for hypertension.    Diagnoses and all orders for this visit:    Essential hypertension: Established problem improved  1.)  Decrease Bystolic/HCTZ in half to 5/3.125 mg every morning.  2.)  Decrease her p.m. lisinopril to 20 mg p.o. daily p.m.    Anxiety: Established problem, improved      Plan:  EKG to evaluate bradycardia  2.)  Follow-up in 2 to 3 weeks for reevaluation of blood pressure       Edward Hawk MD  10/02/2020

## 2020-10-27 RX ORDER — BISOPROLOL FUMARATE AND HYDROCHLOROTHIAZIDE 10; 6.25 MG/1; MG/1
1 TABLET ORAL DAILY
COMMUNITY
End: 2021-03-17

## 2020-10-27 RX ORDER — ESCITALOPRAM OXALATE 10 MG/1
10 TABLET ORAL DAILY
COMMUNITY
End: 2021-01-08 | Stop reason: SDUPTHER

## 2020-10-29 ENCOUNTER — OFFICE VISIT (OUTPATIENT)
Dept: CARDIOLOGY | Facility: CLINIC | Age: 60
End: 2020-10-29

## 2020-10-29 VITALS
SYSTOLIC BLOOD PRESSURE: 169 MMHG | HEART RATE: 53 BPM | HEIGHT: 67 IN | DIASTOLIC BLOOD PRESSURE: 84 MMHG | BODY MASS INDEX: 29.66 KG/M2 | WEIGHT: 189 LBS

## 2020-10-29 DIAGNOSIS — R01.1 HEART MURMUR: ICD-10-CM

## 2020-10-29 DIAGNOSIS — R07.2 PRECORDIAL PAIN: ICD-10-CM

## 2020-10-29 DIAGNOSIS — R00.2 PALPITATION: Primary | ICD-10-CM

## 2020-10-29 DIAGNOSIS — I10 BENIGN ESSENTIAL HYPERTENSION: ICD-10-CM

## 2020-10-29 DIAGNOSIS — E78.5 HYPERLIPIDEMIA, UNSPECIFIED HYPERLIPIDEMIA TYPE: ICD-10-CM

## 2020-10-29 PROCEDURE — 99214 OFFICE O/P EST MOD 30 MIN: CPT | Performed by: INTERNAL MEDICINE

## 2020-10-29 RX ORDER — AMLODIPINE BESYLATE 5 MG/1
5 TABLET ORAL DAILY
Qty: 30 TABLET | Refills: 11 | Status: SHIPPED | OUTPATIENT
Start: 2020-10-29 | End: 2020-11-16 | Stop reason: ALTCHOICE

## 2020-11-16 ENCOUNTER — OFFICE VISIT (OUTPATIENT)
Dept: CARDIOLOGY | Facility: CLINIC | Age: 60
End: 2020-11-16

## 2020-11-16 ENCOUNTER — HOSPITAL ENCOUNTER (OUTPATIENT)
Dept: CARDIOLOGY | Facility: HOSPITAL | Age: 60
Discharge: HOME | End: 2020-11-16
Admitting: INTERNAL MEDICINE

## 2020-11-16 VITALS
WEIGHT: 189 LBS | HEIGHT: 67 IN | SYSTOLIC BLOOD PRESSURE: 146 MMHG | BODY MASS INDEX: 29.66 KG/M2 | DIASTOLIC BLOOD PRESSURE: 94 MMHG | HEART RATE: 63 BPM

## 2020-11-16 VITALS
HEIGHT: 67 IN | WEIGHT: 187 LBS | SYSTOLIC BLOOD PRESSURE: 139 MMHG | DIASTOLIC BLOOD PRESSURE: 88 MMHG | BODY MASS INDEX: 29.35 KG/M2 | HEART RATE: 56 BPM

## 2020-11-16 DIAGNOSIS — R01.1 HEART MURMUR: ICD-10-CM

## 2020-11-16 DIAGNOSIS — E78.5 HYPERLIPIDEMIA, UNSPECIFIED HYPERLIPIDEMIA TYPE: ICD-10-CM

## 2020-11-16 DIAGNOSIS — I10 BENIGN ESSENTIAL HYPERTENSION: Primary | ICD-10-CM

## 2020-11-16 DIAGNOSIS — R00.2 PALPITATION: ICD-10-CM

## 2020-11-16 PROCEDURE — 93306 TTE W/DOPPLER COMPLETE: CPT

## 2020-11-16 PROCEDURE — 93306 TTE W/DOPPLER COMPLETE: CPT | Performed by: INTERNAL MEDICINE

## 2020-11-16 PROCEDURE — 99214 OFFICE O/P EST MOD 30 MIN: CPT | Performed by: INTERNAL MEDICINE

## 2020-11-16 RX ORDER — OLMESARTAN MEDOXOMIL 40 MG/1
40 TABLET ORAL DAILY
Qty: 30 TABLET | Refills: 3 | Status: SHIPPED | OUTPATIENT
Start: 2020-11-16 | End: 2021-03-17 | Stop reason: SDUPTHER

## 2020-11-16 NOTE — PROGRESS NOTES
2 week follow up bp med change with echo    Subjective:        Bruna Jacobs is a 60 y.o. female who here for follow up    CC  Follow up after norvasc, bp better but causing loss of hair  HPI  60-year-old female with known history of the hypertension was started on the Norvasc claims that the patient has significant hair loss because of Norvasc     Problems Addressed this Visit        Cardiovascular and Mediastinum    Benign essential hypertension - Primary    Relevant Medications    olmesartan (Benicar) 40 MG tablet    Hyperlipidemia    Palpitation    Heart murmur      Diagnoses       Codes Comments    Benign essential hypertension    -  Primary ICD-10-CM: I10  ICD-9-CM: 401.1     Heart murmur     ICD-10-CM: R01.1  ICD-9-CM: 785.2     Hyperlipidemia, unspecified hyperlipidemia type     ICD-10-CM: E78.5  ICD-9-CM: 272.4     Palpitation     ICD-10-CM: R00.2  ICD-9-CM: 785.1         .    The following portions of the patient's history were reviewed and updated as appropriate: allergies, current medications, past family history, past medical history, past social history, past surgical history and problem list.    Past Medical History:   Diagnosis Date   • GERD (gastroesophageal reflux disease)    • Glaucoma    • Heart murmur    • Hyperlipidemia    • Hypertension      reports that she has never smoked. She has never used smokeless tobacco. She reports current alcohol use. She reports that she does not use drugs.   Family History   Problem Relation Age of Onset   • Diabetes Mother    • Heart disease Mother    • Hypertension Mother    • Diabetes Father    • Hypertension Father    • Heart failure Father    • Heart disease Father    • Heart attack Neg Hx        Review of Systems  Constitutional: No wt loss, fever, fatigue  Gastrointestinal: No nausea, abdominal pain  Behavioral/Psych: No insomnia or anxiety   Cardiovascular no chest pains or tightness in the chest  Objective:       Physical Exam  /88 (BP Location:  "Right arm, Patient Position: Sitting)   Pulse 56   Ht 170.2 cm (67\")   Wt 84.8 kg (187 lb)   LMP  (LMP Unknown)   BMI 29.29 kg/m²   General appearance: No acute changes   Neck: Trachea midline; NECK, supple, no thyromegaly or lymphadenopathy   Lungs: Normal size and shape, normal breath sounds, equal distribution of air, no rales and rhonchi   CV: S1-S2 regular, no murmurs, no rub, no gallop   Abdomen: Soft, non-tender; no masses , no abnormal abdominal sounds   Extremities: No deformity , normal color , no peripheral edema   Skin: Normal temperature, turgor and texture; no rash, ulcers          Procedures      Echocardiogram:        Current Outpatient Medications:   •  Alirocumab (Praluent) 150 MG/ML solution auto-injector, Inject  under the skin into the appropriate area as directed Every 14 (Fourteen) Days., Disp: , Rfl:   •  amLODIPine (NORVASC) 5 MG tablet, Take 1 tablet by mouth Daily., Disp: 30 tablet, Rfl: 11  •  aspirin  MG tablet, Take 325 mg by mouth Daily., Disp: , Rfl:   •  bisoprolol-hydrochlorothiazide (ZIAC) 10-6.25 MG per tablet, Take 1 tablet by mouth Daily., Disp: , Rfl:   •  escitalopram (LEXAPRO) 10 MG tablet, Take 10 mg by mouth Daily., Disp: , Rfl:   •  famotidine (PEPCID) 20 MG tablet, Take 20 mg by mouth 2 (Two) Times a Day., Disp: , Rfl:   •  Fexofenadine HCl (ALLEGRA ALLERGY PO), Take  by mouth., Disp: , Rfl:   •  timolol (TIMOPTIC) 0.5 % ophthalmic solution, Administer 1 drop to both eyes 2 (Two) Times a Day., Disp: , Rfl:    Assessment:        Patient Active Problem List   Diagnosis   • Benign essential hypertension   • Fatigue   • Left carotid bruit   • Palpitations   • Hyperlipidemia   • Palpitation   • Precordial pain   • Thyroid nodule   • Heart murmur               Plan:            ICD-10-CM ICD-9-CM   1. Benign essential hypertension  I10 401.1   2. Heart murmur  R01.1 785.2   3. Hyperlipidemia, unspecified hyperlipidemia type  E78.5 272.4   4. Palpitation  R00.2 785.1 "     1. Benign essential hypertension  Blood pressure is under control but the patient having side effects with Norvasc will switch to Benicar    2. Heart murmur  Continue current treatment    3. Hyperlipidemia, unspecified hyperlipidemia type  Continue current treatment    4. Palpitation  Controlled       Dc norvasc    Start benicar 40 mg po daily    Pros and cons of this new medication / change medication has been explained to  the patient    Possible side effects has been explained    Associated need of the blood  Work has been explained    Need for the compliance of the medication has been explained      See in 2 wks  COUNSELING:    Bruna Price was given to patient for the following topics: diagnostic results, risk factor reductions, impressions, risks and benefits of treatment options and importance of treatment compliance .       SMOKING COUNSELING:    [unfilled]    Dictated using Dragon dictation

## 2020-11-17 LAB
AORTIC DIMENSIONLESS INDEX: 0.6 (DI)
BH CV ECHO MEAS - ACS: 1.5 CM
BH CV ECHO MEAS - AO MAX PG (FULL): 8.5 MMHG
BH CV ECHO MEAS - AO MAX PG: 13 MMHG
BH CV ECHO MEAS - AO MEAN PG (FULL): 3.9 MMHG
BH CV ECHO MEAS - AO MEAN PG: 6.4 MMHG
BH CV ECHO MEAS - AO ROOT AREA (BSA CORRECTED): 1.4
BH CV ECHO MEAS - AO ROOT AREA: 6 CM^2
BH CV ECHO MEAS - AO ROOT DIAM: 2.8 CM
BH CV ECHO MEAS - AO V2 MAX: 180.5 CM/SEC
BH CV ECHO MEAS - AO V2 MEAN: 117.4 CM/SEC
BH CV ECHO MEAS - AO V2 VTI: 32.2 CM
BH CV ECHO MEAS - ASC AORTA: 2.3 CM
BH CV ECHO MEAS - AVA(I,A): 1.6 CM^2
BH CV ECHO MEAS - AVA(I,D): 1.6 CM^2
BH CV ECHO MEAS - AVA(V,A): 1.6 CM^2
BH CV ECHO MEAS - AVA(V,D): 1.6 CM^2
BH CV ECHO MEAS - BSA(HAYCOCK): 2 M^2
BH CV ECHO MEAS - BSA: 2 M^2
BH CV ECHO MEAS - BZI_BMI: 29.6 KILOGRAMS/M^2
BH CV ECHO MEAS - BZI_METRIC_HEIGHT: 170.2 CM
BH CV ECHO MEAS - BZI_METRIC_WEIGHT: 85.7 KG
BH CV ECHO MEAS - EDV(CUBED): 49.5 ML
BH CV ECHO MEAS - EDV(MOD-SP2): 53 ML
BH CV ECHO MEAS - EDV(MOD-SP4): 52 ML
BH CV ECHO MEAS - EDV(TEICH): 57.1 ML
BH CV ECHO MEAS - EF(CUBED): 69.4 %
BH CV ECHO MEAS - EF(MOD-BP): 59.5 %
BH CV ECHO MEAS - EF(MOD-SP2): 66 %
BH CV ECHO MEAS - EF(MOD-SP4): 53.8 %
BH CV ECHO MEAS - EF(TEICH): 61.8 %
BH CV ECHO MEAS - ESV(CUBED): 15.2 ML
BH CV ECHO MEAS - ESV(MOD-SP2): 18 ML
BH CV ECHO MEAS - ESV(MOD-SP4): 24 ML
BH CV ECHO MEAS - ESV(TEICH): 21.8 ML
BH CV ECHO MEAS - FS: 32.6 %
BH CV ECHO MEAS - IVS/LVPW: 1.1
BH CV ECHO MEAS - IVSD: 1 CM
BH CV ECHO MEAS - LAT PEAK E' VEL: 9.2 CM/SEC
BH CV ECHO MEAS - LV DIASTOLIC VOL/BSA (35-75): 26.3 ML/M^2
BH CV ECHO MEAS - LV MASS(C)D: 106.1 GRAMS
BH CV ECHO MEAS - LV MASS(C)DI: 53.7 GRAMS/M^2
BH CV ECHO MEAS - LV MAX PG: 4.6 MMHG
BH CV ECHO MEAS - LV MEAN PG: 2.5 MMHG
BH CV ECHO MEAS - LV SYSTOLIC VOL/BSA (12-30): 12.2 ML/M^2
BH CV ECHO MEAS - LV V1 MAX: 106.7 CM/SEC
BH CV ECHO MEAS - LV V1 MEAN: 74.1 CM/SEC
BH CV ECHO MEAS - LV V1 VTI: 19.6 CM
BH CV ECHO MEAS - LVIDD: 3.7 CM
BH CV ECHO MEAS - LVIDS: 2.5 CM
BH CV ECHO MEAS - LVLD AP2: 6.9 CM
BH CV ECHO MEAS - LVLD AP4: 6.9 CM
BH CV ECHO MEAS - LVLS AP2: 5.5 CM
BH CV ECHO MEAS - LVLS AP4: 5.8 CM
BH CV ECHO MEAS - LVOT AREA (M): 2.8 CM^2
BH CV ECHO MEAS - LVOT AREA: 2.7 CM^2
BH CV ECHO MEAS - LVOT DIAM: 1.9 CM
BH CV ECHO MEAS - LVPWD: 0.94 CM
BH CV ECHO MEAS - MED PEAK E' VEL: 7.3 CM/SEC
BH CV ECHO MEAS - MV A DUR: 0.14 SEC
BH CV ECHO MEAS - MV A MAX VEL: 88.3 CM/SEC
BH CV ECHO MEAS - MV DEC SLOPE: 274.8 CM/SEC^2
BH CV ECHO MEAS - MV DEC TIME: 248 SEC
BH CV ECHO MEAS - MV E MAX VEL: 70.3 CM/SEC
BH CV ECHO MEAS - MV E/A: 0.8
BH CV ECHO MEAS - MV MAX PG: 3.8 MMHG
BH CV ECHO MEAS - MV MEAN PG: 1.1 MMHG
BH CV ECHO MEAS - MV P1/2T MAX VEL: 78.7 CM/SEC
BH CV ECHO MEAS - MV P1/2T: 83.9 MSEC
BH CV ECHO MEAS - MV V2 MAX: 97.7 CM/SEC
BH CV ECHO MEAS - MV V2 MEAN: 48.8 CM/SEC
BH CV ECHO MEAS - MV V2 VTI: 27.2 CM
BH CV ECHO MEAS - MVA P1/2T LCG: 2.8 CM^2
BH CV ECHO MEAS - MVA(P1/2T): 2.6 CM^2
BH CV ECHO MEAS - MVA(VTI): 2 CM^2
BH CV ECHO MEAS - PA ACC TIME: 0.06 SEC
BH CV ECHO MEAS - PA MAX PG (FULL): 1.3 MMHG
BH CV ECHO MEAS - PA MAX PG: 4 MMHG
BH CV ECHO MEAS - PA PR(ACCEL): 52.1 MMHG
BH CV ECHO MEAS - PA V2 MAX: 100.4 CM/SEC
BH CV ECHO MEAS - PULM A REVS DUR: 0.14 SEC
BH CV ECHO MEAS - PULM A REVS VEL: 27.6 CM/SEC
BH CV ECHO MEAS - PULM DIAS VEL: 42.2 CM/SEC
BH CV ECHO MEAS - PULM S/D: 1
BH CV ECHO MEAS - PULM SYS VEL: 44.1 CM/SEC
BH CV ECHO MEAS - PVA(V,A): 1.4 CM^2
BH CV ECHO MEAS - PVA(V,D): 1.4 CM^2
BH CV ECHO MEAS - QP/QS: 0.55
BH CV ECHO MEAS - RV MAX PG: 2.8 MMHG
BH CV ECHO MEAS - RV MEAN PG: 1.3 MMHG
BH CV ECHO MEAS - RV V1 MAX: 83.4 CM/SEC
BH CV ECHO MEAS - RV V1 MEAN: 51.9 CM/SEC
BH CV ECHO MEAS - RV V1 VTI: 17.4 CM
BH CV ECHO MEAS - RVOT AREA: 1.7 CM^2
BH CV ECHO MEAS - RVOT DIAM: 1.5 CM
BH CV ECHO MEAS - SI(AO): 98.2 ML/M^2
BH CV ECHO MEAS - SI(CUBED): 17.4 ML/M^2
BH CV ECHO MEAS - SI(LVOT): 26.9 ML/M^2
BH CV ECHO MEAS - SI(MOD-SP2): 17.7 ML/M^2
BH CV ECHO MEAS - SI(MOD-SP4): 14.2 ML/M^2
BH CV ECHO MEAS - SI(TEICH): 17.9 ML/M^2
BH CV ECHO MEAS - SV(AO): 193.8 ML
BH CV ECHO MEAS - SV(CUBED): 34.3 ML
BH CV ECHO MEAS - SV(LVOT): 53.2 ML
BH CV ECHO MEAS - SV(MOD-SP2): 35 ML
BH CV ECHO MEAS - SV(MOD-SP4): 28 ML
BH CV ECHO MEAS - SV(RVOT): 29.1 ML
BH CV ECHO MEAS - SV(TEICH): 35.3 ML
BH CV ECHO MEAS - TAPSE (>1.6): 2.1 CM
BH CV ECHO MEASUREMENTS AVERAGE E/E' RATIO: 8.52
BH CV XLRA - RV BASE: 3.1 CM
BH CV XLRA - RV LENGTH: 5.6 CM
BH CV XLRA - RV MID: 2.7 CM
BH CV XLRA - TDI S': 17 CM/SEC
LEFT ATRIUM VOLUME INDEX: 18 ML/M2
MAXIMAL PREDICTED HEART RATE: 159 BPM
STRESS TARGET HR: 135 BPM

## 2020-11-18 ENCOUNTER — OFFICE VISIT (OUTPATIENT)
Dept: FAMILY MEDICINE CLINIC | Facility: CLINIC | Age: 60
End: 2020-11-18

## 2020-11-18 VITALS
WEIGHT: 187.2 LBS | HEIGHT: 67 IN | RESPIRATION RATE: 16 BRPM | SYSTOLIC BLOOD PRESSURE: 116 MMHG | HEART RATE: 61 BPM | DIASTOLIC BLOOD PRESSURE: 70 MMHG | OXYGEN SATURATION: 99 % | BODY MASS INDEX: 29.38 KG/M2 | TEMPERATURE: 98.1 F

## 2020-11-18 DIAGNOSIS — I10 ESSENTIAL HYPERTENSION: Primary | ICD-10-CM

## 2020-11-18 DIAGNOSIS — F41.9 ANXIETY: ICD-10-CM

## 2020-11-18 DIAGNOSIS — L65.9 ALOPECIA: ICD-10-CM

## 2020-11-18 DIAGNOSIS — E78.5 HYPERLIPIDEMIA, UNSPECIFIED HYPERLIPIDEMIA TYPE: ICD-10-CM

## 2020-11-18 PROCEDURE — 90471 IMMUNIZATION ADMIN: CPT | Performed by: INTERNAL MEDICINE

## 2020-11-18 PROCEDURE — 99214 OFFICE O/P EST MOD 30 MIN: CPT | Performed by: INTERNAL MEDICINE

## 2020-11-18 PROCEDURE — 90686 IIV4 VACC NO PRSV 0.5 ML IM: CPT | Performed by: INTERNAL MEDICINE

## 2020-12-10 ENCOUNTER — OFFICE VISIT (OUTPATIENT)
Dept: CARDIOLOGY | Facility: CLINIC | Age: 60
End: 2020-12-10

## 2020-12-10 VITALS
HEART RATE: 56 BPM | SYSTOLIC BLOOD PRESSURE: 155 MMHG | DIASTOLIC BLOOD PRESSURE: 91 MMHG | HEIGHT: 67 IN | WEIGHT: 189 LBS | BODY MASS INDEX: 29.66 KG/M2

## 2020-12-10 DIAGNOSIS — I10 BENIGN ESSENTIAL HYPERTENSION: ICD-10-CM

## 2020-12-10 DIAGNOSIS — R00.2 PALPITATION: Primary | ICD-10-CM

## 2020-12-10 PROCEDURE — 99212 OFFICE O/P EST SF 10 MIN: CPT | Performed by: INTERNAL MEDICINE

## 2020-12-10 NOTE — PROGRESS NOTES
"HTN, 2 WEEK FOLLOW UP   Subjective:        Bruna Jacobs is a 60 y.o. female who here for follow up    CC  benicar follow up  HPI  60-year-old female with benign essential arterial hypertension as well as palpitation here for the follow-up after the patient was started on Benicar blood pressure has markedly improved     Problems Addressed this Visit        Cardiovascular and Mediastinum    Benign essential hypertension    Palpitation - Primary      Diagnoses       Codes Comments    Palpitation    -  Primary ICD-10-CM: R00.2  ICD-9-CM: 785.1     Benign essential hypertension     ICD-10-CM: I10  ICD-9-CM: 401.1         .    The following portions of the patient's history were reviewed and updated as appropriate: allergies, current medications, past family history, past medical history, past social history, past surgical history and problem list.    Past Medical History:   Diagnosis Date   • GERD (gastroesophageal reflux disease)    • Glaucoma    • Heart murmur    • Hyperlipidemia    • Hypertension      reports that she has never smoked. She has never used smokeless tobacco. She reports current alcohol use. She reports that she does not use drugs.   Family History   Problem Relation Age of Onset   • Diabetes Mother    • Heart disease Mother    • Hypertension Mother    • Diabetes Father    • Hypertension Father    • Heart failure Father    • Heart disease Father    • Heart attack Neg Hx        Review of Systems  Constitutional: No wt loss, fever, fatigue  Gastrointestinal: No nausea, abdominal pain  Behavioral/Psych: No insomnia or anxiety   Cardiovascular no chest pains or tightness in the chest  Objective:       Physical Exam  /91   Pulse 56   Ht 170.2 cm (67\")   Wt 85.7 kg (189 lb)   LMP  (LMP Unknown)   BMI 29.60 kg/m²   General appearance: No acute changes   Neck: Trachea midline; NECK, supple, no thyromegaly or lymphadenopathy   Lungs: Normal size and shape, normal breath sounds, equal distribution of " air, no rales and rhonchi   CV: S1-S2 regular, no murmurs, no rub, no gallop   Abdomen: Soft, non-tender; no masses , no abnormal abdominal sounds   Extremities: No deformity , normal color , no peripheral edema   Skin: Normal temperature, turgor and texture; no rash, ulcers          Procedures      Echocardiogram:        Current Outpatient Medications:   •  Alirocumab (Praluent) 150 MG/ML solution auto-injector, Inject  under the skin into the appropriate area as directed Every 14 (Fourteen) Days., Disp: , Rfl:   •  aspirin  MG tablet, Take 325 mg by mouth Daily., Disp: , Rfl:   •  bisoprolol-hydrochlorothiazide (ZIAC) 10-6.25 MG per tablet, Take 1 tablet by mouth Daily. 1/2 TAB QD, Disp: , Rfl:   •  escitalopram (LEXAPRO) 10 MG tablet, Take 10 mg by mouth Daily., Disp: , Rfl:   •  famotidine (PEPCID) 20 MG tablet, Take 20 mg by mouth 2 (Two) Times a Day., Disp: , Rfl:   •  Fexofenadine HCl (ALLEGRA ALLERGY PO), Take  by mouth., Disp: , Rfl:   •  olmesartan (Benicar) 40 MG tablet, Take 1 tablet by mouth Daily., Disp: 30 tablet, Rfl: 3  •  timolol (TIMOPTIC) 0.5 % ophthalmic solution, Administer 1 drop to both eyes 2 (Two) Times a Day., Disp: , Rfl:    Assessment:        Patient Active Problem List   Diagnosis   • Benign essential hypertension   • Fatigue   • Left carotid bruit   • Palpitations   • Hyperlipidemia   • Palpitation   • Precordial pain   • Thyroid nodule   • Heart murmur               Plan:            ICD-10-CM ICD-9-CM   1. Palpitation  R00.2 785.1   2. Benign essential hypertension  I10 401.1     1. Palpitation  Under control    2. Benign essential hypertension  Blood pressure markedly improved with Benicar continue current treatment follow-up in 6 months      bp better     See in 6 months  COUNSELING:    Bruna Price was given to patient for the following topics: diagnostic results, risk factor reductions, impressions, risks and benefits of treatment options and importance of  treatment compliance .       SMOKING COUNSELING:    [unfilled]    Dictated using Dragon dictation

## 2021-01-08 RX ORDER — ESCITALOPRAM OXALATE 10 MG/1
10 TABLET ORAL DAILY
Qty: 30 TABLET | Refills: 3 | Status: SHIPPED | OUTPATIENT
Start: 2021-01-08 | End: 2021-03-17 | Stop reason: SDUPTHER

## 2021-01-08 NOTE — TELEPHONE ENCOUNTER
Caller: Bruna Jacobs    Relationship: Self    Best call back number: [phone]    Medication needed:   escitalopram (LEXAPRO) 10 MG tablet    When do you need the refill by: ASAP    What details did the patient provide when requesting the medication: PATIENT HAS BEEN OUT OF THIS MEDICATION GOING ON 2 WEEKS    Does the patient have less than a 3 day supply:  [x] Yes  [] No    What is the patient's preferred pharmacy:      [address] [phone] [facility] [phone]

## 2021-01-15 ENCOUNTER — TELEPHONE (OUTPATIENT)
Dept: FAMILY MEDICINE CLINIC | Facility: CLINIC | Age: 61
End: 2021-01-15

## 2021-01-15 RX ORDER — AZITHROMYCIN 250 MG/1
TABLET, FILM COATED ORAL
Qty: 6 TABLET | Refills: 0 | Status: SHIPPED | OUTPATIENT
Start: 2021-01-15 | End: 2021-03-17

## 2021-01-15 NOTE — TELEPHONE ENCOUNTER
Caller: Bruna Jacobs    Relationship: Self    Best call back number: [phone]    What medication are you requesting: ANTIBIOTIC    What are your current symptoms: *CHEST CONGESTION, EARS CLOGGED, NASAL SORENESS, HEADACHES    How long have you been experiencing symptoms: A WEEK    Have you had these symptoms before:    [x] Yes  [] No    Have you been treated for these symptoms before:   [x] Yes  [] No    If a prescription is needed, what is your preferred pharmacy and phone number: [facility] [address] - [phone] [facility] [phone]      Additional notes: WOULD LIKE THIS CALLED IN TODAY IF POSSIBLE.

## 2021-03-17 ENCOUNTER — OFFICE VISIT (OUTPATIENT)
Dept: FAMILY MEDICINE CLINIC | Facility: CLINIC | Age: 61
End: 2021-03-17

## 2021-03-17 VITALS
WEIGHT: 196 LBS | DIASTOLIC BLOOD PRESSURE: 96 MMHG | TEMPERATURE: 97.3 F | HEIGHT: 67 IN | BODY MASS INDEX: 30.76 KG/M2 | RESPIRATION RATE: 16 BRPM | HEART RATE: 59 BPM | OXYGEN SATURATION: 100 % | SYSTOLIC BLOOD PRESSURE: 136 MMHG

## 2021-03-17 DIAGNOSIS — I10 BENIGN ESSENTIAL HYPERTENSION: Primary | Chronic | ICD-10-CM

## 2021-03-17 DIAGNOSIS — F41.9 ANXIETY: ICD-10-CM

## 2021-03-17 DIAGNOSIS — E78.00 PURE HYPERCHOLESTEROLEMIA: ICD-10-CM

## 2021-03-17 DIAGNOSIS — R63.5 WEIGHT GAIN: ICD-10-CM

## 2021-03-17 DIAGNOSIS — J32.1 SINUSITIS CHRONIC, FRONTAL: ICD-10-CM

## 2021-03-17 PROCEDURE — 99214 OFFICE O/P EST MOD 30 MIN: CPT | Performed by: INTERNAL MEDICINE

## 2021-03-17 RX ORDER — AZITHROMYCIN 250 MG/1
TABLET, FILM COATED ORAL
Qty: 6 TABLET | Refills: 0 | Status: SHIPPED | OUTPATIENT
Start: 2021-03-17 | End: 2021-04-07

## 2021-03-17 RX ORDER — ESCITALOPRAM OXALATE 10 MG/1
10 TABLET ORAL DAILY
Qty: 90 TABLET | Refills: 1 | Status: SHIPPED | OUTPATIENT
Start: 2021-03-17 | End: 2021-06-09

## 2021-03-17 RX ORDER — OLMESARTAN MEDOXOMIL 40 MG/1
40 TABLET ORAL DAILY
Qty: 90 TABLET | Refills: 1 | Status: SHIPPED | OUTPATIENT
Start: 2021-03-17 | End: 2021-09-14

## 2021-03-17 NOTE — PROGRESS NOTES
"03/17/2021    CC: Hypertension (follow up) and Sinusitis (congestion and some coughing)  .        HPI  History of Present Illness     Subjective   Bruna Jacobs is a 60 y.o. female.      The following portions of the patient's history were reviewed and updated as appropriate: {history reviewed:20406::\"allergies\",\"current medications\",\"past family history\",\"past medical history\",\"past social history\",\"past surgical history\",\"problem list\"}.    Problem List  Patient Active Problem List   Diagnosis   • Benign essential hypertension   • Fatigue   • Left carotid bruit   • Palpitations   • Hyperlipidemia   • Palpitation   • Precordial pain   • Thyroid nodule   • Heart murmur       Past Medical History  Past Medical History:   Diagnosis Date   • GERD (gastroesophageal reflux disease)    • Glaucoma    • Heart murmur    • Hyperlipidemia    • Hypertension        Surgical History  Past Surgical History:   Procedure Laterality Date   • CARDIAC CATHETERIZATION N/A 3/15/2019    Procedure: Left Heart Cath;  Surgeon: Graciela Boland MD;  Location: [facility] INVASIVE LOCATION;  Service: Cardiology   • CARDIAC CATHETERIZATION N/A 3/15/2019    Procedure: Coronary angiography;  Surgeon: Graciela Boland MD;  Location:  [facility] CATH INVASIVE LOCATION;  Service: Cardiology   • HYSTERECTOMY     • THYROIDECTOMY, PARTIAL         Family History  Family History   Problem Relation Age of Onset   • Diabetes Mother    • Heart disease Mother    • Hypertension Mother    • Diabetes Father    • Hypertension Father    • Heart failure Father    • Heart disease Father    • Heart attack Neg Hx        Social History  Social History    Tobacco Use      Smoking status: Never Smoker      Smokeless tobacco: Never Used       Is the Patient a current tobacco user? {Tobacco Usage:13209}    Allergies  Allergies   Allergen Reactions   • Atorvastatin Myalgia   • Niacin Unknown - Low Severity     CANNOT REMEMBER   • Rosuvastatin Myalgia   • Statins " Myalgia       Current Medications    Current Outpatient Medications:   •  Alirocumab (Praluent) 150 MG/ML solution auto-injector, Inject  under the skin into the appropriate area as directed Every 14 (Fourteen) Days., Disp: , Rfl:   •  aspirin  MG tablet, Take 325 mg by mouth Daily., Disp: , Rfl:   •  escitalopram (LEXAPRO) 10 MG tablet, Take 1 tablet by mouth Daily., Disp: 30 tablet, Rfl: 3  •  famotidine (PEPCID) 20 MG tablet, Take 20 mg by mouth 2 (Two) Times a Day., Disp: , Rfl:   •  Fexofenadine HCl (ALLEGRA ALLERGY PO), Take  by mouth., Disp: , Rfl:   •  olmesartan (Benicar) 40 MG tablet, Take 1 tablet by mouth Daily., Disp: 30 tablet, Rfl: 3  •  timolol (TIMOPTIC) 0.5 % ophthalmic solution, Administer 1 drop to both eyes 2 (Two) Times a Day., Disp: , Rfl:      Review of System  Review of Systems  I have reviewed and confirmed the accuracy of the ROS as documented by the MA/LPN/RN Edward Hawk MD    Vitals:    03/17/21 0836   BP: 136/96   Pulse: 59   Resp: 16   Temp: 97.3 °F (36.3 °C)   SpO2: 100%     Body mass index is 30.7 kg/m².    Objective     Physical Exam  Physical Exam    Assessment/Plan   Diagnoses and all orders for this visit:    1. Benign essential hypertension (Primary)  -     olmesartan (Benicar) 40 MG tablet; Take 1 tablet by mouth Daily.  Dispense: 90 tablet; Refill: 1    2. Anxiety  -     escitalopram (LEXAPRO) 10 MG tablet; Take 1 tablet by mouth Daily.  Dispense: 90 tablet; Refill: 1             Edward Hawk MD  03/17/2021

## 2021-03-17 NOTE — PROGRESS NOTES
03/17/2021    CC: Hypertension (follow up) and Sinusitis (congestion and some coughing)  .        HPI  Hypertension  This is a chronic problem. The current episode started more than 1 year ago. The problem is unchanged. The problem is uncontrolled. There are no associated agents to hypertension. Risk factors for coronary artery disease include obesity and stress. Past treatments include beta blockers and ACE inhibitors.   Sinusitis         Subjective   Bruna Jacobs is a 60 y.o. female.      The following portions of the patient's history were reviewed and updated as appropriate: allergies, current medications, past family history, past medical history, past social history, past surgical history and problem list.    Problem List  Patient Active Problem List   Diagnosis   • Benign essential hypertension   • Fatigue   • Left carotid bruit   • Palpitations   • Hyperlipidemia   • Palpitation   • Precordial pain   • Thyroid nodule   • Heart murmur       Past Medical History  Past Medical History:   Diagnosis Date   • GERD (gastroesophageal reflux disease)    • Glaucoma    • Heart murmur    • Hyperlipidemia    • Hypertension        Surgical History  Past Surgical History:   Procedure Laterality Date   • CARDIAC CATHETERIZATION N/A 3/15/2019    Procedure: Left Heart Cath;  Surgeon: Graciela Boland MD;  Location: [facility] INVASIVE LOCATION;  Service: Cardiology   • CARDIAC CATHETERIZATION N/A 3/15/2019    Procedure: Coronary angiography;  Surgeon: Graciela Boland MD;  Location: [facility] INVASIVE LOCATION;  Service: Cardiology   • HYSTERECTOMY     • THYROIDECTOMY, PARTIAL         Family History  Family History   Problem Relation Age of Onset   • Diabetes Mother    • Heart disease Mother    • Hypertension Mother    • Diabetes Father    • Hypertension Father    • Heart failure Father    • Heart disease Father    • Heart attack Neg Hx        Social History  Social History    Tobacco Use      Smoking  status: Never Smoker      Smokeless tobacco: Never Used       Is the Patient a current tobacco user? No    Allergies  Allergies   Allergen Reactions   • Atorvastatin Myalgia   • Niacin Unknown - Low Severity     CANNOT REMEMBER   • Rosuvastatin Myalgia   • Statins Myalgia       Current Medications    Current Outpatient Medications:   •  Alirocumab (Praluent) 150 MG/ML solution auto-injector, Inject  under the skin into the appropriate area as directed Every 14 (Fourteen) Days., Disp: , Rfl:   •  aspirin  MG tablet, Take 325 mg by mouth Daily., Disp: , Rfl:   •  escitalopram (LEXAPRO) 10 MG tablet, Take 1 tablet by mouth Daily., Disp: 90 tablet, Rfl: 1  •  famotidine (PEPCID) 20 MG tablet, Take 20 mg by mouth 2 (Two) Times a Day., Disp: , Rfl:   •  Fexofenadine HCl (ALLEGRA ALLERGY PO), Take  by mouth., Disp: , Rfl:   •  olmesartan (Benicar) 40 MG tablet, Take 1 tablet by mouth Daily., Disp: 90 tablet, Rfl: 1  •  timolol (TIMOPTIC) 0.5 % ophthalmic solution, Administer 1 drop to both eyes 2 (Two) Times a Day., Disp: , Rfl:   •  azithromycin (Zithromax Z-Reynaldo) 250 MG tablet, Take 2 tablets by mouth on day 1, then 1 tablet daily on days 2-5, Disp: 6 tablet, Rfl: 0     Review of System  Review of Systems   Cardiovascular: Negative.    Gastrointestinal: Negative.      I have reviewed and confirmed the accuracy of the ROS as documented by the MA/LPN/RN Edward Hawk MD    Vitals:    03/17/21 0836   BP: 136/96   Pulse: 59   Resp: 16   Temp: 97.3 °F (36.3 °C)   SpO2: 100%     Body mass index is 30.7 kg/m².    Objective     Physical Exam  Physical Exam  Constitutional:       Appearance: Normal appearance.   HENT:      Head:      Comments: Tenderness to percussion over the right maxillary and right supraorbital sinus area.  Cardiovascular:      Rate and Rhythm: Normal rate and regular rhythm.      Pulses: Normal pulses.      Heart sounds: Normal heart sounds.   Pulmonary:      Effort: Pulmonary effort is normal.       Breath sounds: Normal breath sounds.   Musculoskeletal:      Cervical back: Normal range of motion.   Neurological:      Mental Status: She is alert.         Assessment/Plan      This patient presents today for follow-up of hypertension.  She relates she has had some facial pain for the past 2 weeks as well.  She is used Allegra but has not found any improvement.  She relates that she is under more stress at home.    With her last visit her blood pressure was borderline high at 144/84.  Her blood pressure today is 160/94 in the left arm sitting position standard cuff.  Note is made she is picked up 7 pounds from her last visit.    I reviewed her labs from 9/22/2020 showing a normal comprehensive metabolic panel.  Her lipid profile was elevated however 164 patient relates that she has not been adherent to her cholesterol diet.    The patient did not take all of her blood pressure medicine this morning as well.  We will see her back for follow-up in the next few weeks when she would be on her medicine.      Diagnoses and all orders for this visit:    1. Benign essential hypertension (Primary)  -     olmesartan (Benicar) 40 MG tablet; Take 1 tablet by mouth Daily.  Dispense: 90 tablet; Refill: 1    2. Anxiety  -     escitalopram (LEXAPRO) 10 MG tablet; Take 1 tablet by mouth Daily.  Dispense: 90 tablet; Refill: 1    3. Sinusitis chronic, frontal  -     escitalopram (LEXAPRO) 10 MG tablet; Take 1 tablet by mouth Daily.  Dispense: 90 tablet; Refill: 1    4. Pure hypercholesterolemia    5. Weight gain    Other orders  -     azithromycin (Zithromax Z-Reynaldo) 250 MG tablet; Take 2 tablets by mouth on day 1, then 1 tablet daily on days 2-5  Dispense: 6 tablet; Refill: 0             Edward Hawk MD  03/17/2021

## 2021-04-07 ENCOUNTER — OFFICE VISIT (OUTPATIENT)
Dept: FAMILY MEDICINE CLINIC | Facility: CLINIC | Age: 61
End: 2021-04-07

## 2021-04-07 VITALS
HEIGHT: 67 IN | SYSTOLIC BLOOD PRESSURE: 150 MMHG | WEIGHT: 196.4 LBS | DIASTOLIC BLOOD PRESSURE: 100 MMHG | TEMPERATURE: 96.8 F | OXYGEN SATURATION: 98 % | RESPIRATION RATE: 16 BRPM | BODY MASS INDEX: 30.83 KG/M2 | HEART RATE: 68 BPM

## 2021-04-07 DIAGNOSIS — J01.01 ACUTE RECURRENT MAXILLARY SINUSITIS: Chronic | ICD-10-CM

## 2021-04-07 DIAGNOSIS — Z91.119 NONCOMPLIANCE WITH DIETARY RESTRICTION: Chronic | ICD-10-CM

## 2021-04-07 DIAGNOSIS — F41.9 ANXIETY: Chronic | ICD-10-CM

## 2021-04-07 DIAGNOSIS — I10 ESSENTIAL HYPERTENSION: Primary | Chronic | ICD-10-CM

## 2021-04-07 PROCEDURE — 99214 OFFICE O/P EST MOD 30 MIN: CPT | Performed by: INTERNAL MEDICINE

## 2021-04-07 RX ORDER — AZITHROMYCIN 250 MG/1
TABLET, FILM COATED ORAL
Qty: 6 TABLET | Refills: 0 | Status: SHIPPED | OUTPATIENT
Start: 2021-04-07 | End: 2021-08-10

## 2021-04-07 NOTE — PROGRESS NOTES
04/07/2021    CC: Hypertension (follow up) and Allergies (congestion and right ear fullness and itching)  .        HPI  Hypertension  This is a chronic problem. The current episode started more than 1 year ago. The problem has been waxing and waning since onset. Associated symptoms include anxiety. There are no associated agents to hypertension. There are no known risk factors for coronary artery disease.   Allergies         Subjective   Bruna Jacobs is a 60 y.o. female.      The following portions of the patient's history were reviewed and updated as appropriate: allergies, current medications, past family history, past medical history, past social history, past surgical history and problem list.    Problem List  Patient Active Problem List   Diagnosis   • Benign essential hypertension   • Fatigue   • Left carotid bruit   • Palpitations   • Hyperlipidemia   • Palpitation   • Precordial pain   • Thyroid nodule   • Heart murmur       Past Medical History  Past Medical History:   Diagnosis Date   • GERD (gastroesophageal reflux disease)    • Glaucoma    • Heart murmur    • Hyperlipidemia    • Hypertension        Surgical History  Past Surgical History:   Procedure Laterality Date   • CARDIAC CATHETERIZATION N/A 3/15/2019    Procedure: Left Heart Cath;  Surgeon: Graciela Boland MD;  Location: [facility] INVASIVE LOCATION;  Service: Cardiology   • CARDIAC CATHETERIZATION N/A 3/15/2019    Procedure: Coronary angiography;  Surgeon: Graciela Boland MD;  Location: [facility] INVASIVE LOCATION;  Service: Cardiology   • HYSTERECTOMY     • THYROIDECTOMY, PARTIAL         Family History  Family History   Problem Relation Age of Onset   • Diabetes Mother    • Heart disease Mother    • Hypertension Mother    • Diabetes Father    • Hypertension Father    • Heart failure Father    • Heart disease Father    • Heart attack Neg Hx        Social History  Social History    Tobacco Use      Smoking status: Never  Smoker      Smokeless tobacco: Never Used       Is the Patient a current tobacco user? No    Allergies  Allergies   Allergen Reactions   • Atorvastatin Myalgia   • Niacin Unknown - Low Severity     CANNOT REMEMBER   • Rosuvastatin Myalgia   • Statins Myalgia       Current Medications    Current Outpatient Medications:   •  Alirocumab (Praluent) 150 MG/ML solution auto-injector, Inject  under the skin into the appropriate area as directed Every 14 (Fourteen) Days., Disp: , Rfl:   •  aspirin  MG tablet, Take 325 mg by mouth Daily., Disp: , Rfl:   •  escitalopram (LEXAPRO) 10 MG tablet, Take 1 tablet by mouth Daily., Disp: 90 tablet, Rfl: 1  •  famotidine (PEPCID) 20 MG tablet, Take 20 mg by mouth 2 (Two) Times a Day., Disp: , Rfl:   •  Fexofenadine HCl (ALLEGRA ALLERGY PO), Take  by mouth., Disp: , Rfl:   •  olmesartan (Benicar) 40 MG tablet, Take 1 tablet by mouth Daily., Disp: 90 tablet, Rfl: 1  •  timolol (TIMOPTIC) 0.5 % ophthalmic solution, Administer 1 drop to both eyes 2 (Two) Times a Day., Disp: , Rfl:      Review of System  Review of Systems   Respiratory: Negative.    Cardiovascular: Negative.    Gastrointestinal: Negative.    Endocrine: Negative.      I have reviewed and confirmed the accuracy of the ROS as documented by the MA/LPN/RN Edward Hawk MD    Vitals:    04/07/21 0829   BP: 150/100   Pulse: 68   Resp: 16   Temp: 96.8 °F (36 °C)   SpO2: 98%     Body mass index is 30.76 kg/m².    Objective     Physical Exam  Physical Exam  HENT:      Head:      Comments: Bilateral bulging TM are appreciated     Nose: Nose normal.   Cardiovascular:      Rate and Rhythm: Normal rate.      Heart sounds: Normal heart sounds.   Abdominal:      General: Abdomen is flat.   Musculoskeletal:      Cervical back: Normal range of motion and neck supple.         Assessment/Plan        This patient presents for follow-up of hypertension and new onset of other problems.  Her last visit was on 3/17/2021 her blood  pressure was 160/94 at that time.  Review of her LDH from 9/22 showed it was elevated at 164.  She is currently seeing Dr. Shi for elevated cholesterol and is on Purulent injection.  She is not sure with her recent level was.    Her blood pressure today was 144/94 in the left arm sitting position standard cuff she is noncompliant of her decreased sodium diet having had an increased sodium meal last night.  She currently is on bisoprolol HCT 10/6 she takes a half a tablet p.o. q. a.m.    We will try to get her lab results from Dr. Garay.    She chose to forego treatment with a decongestant to help with her sinus problem because it might make her blood pressure elevate.  We discussed with her the importance of identifying any allergy causes to this recurrent sinusitis.    He also has sinus related problems today complaining of fullness in the facial rash region and difficulty hearing.  Examination of the TM shows that they are bulging  Diagnoses and all orders for this visit:    1. Essential hypertension (Primary)    2. Acute recurrent maxillary sinusitis    3. Anxiety    4. Noncompliance with dietary restriction      Plan:  1.)  Continue bisoprolol  2.)  We urged the patient to more stringently follow her low-cholesterol low-sodium diet.  3.)  Referral to allergy for evaluation of possible allergy triggers.  4.)  Z-Reynaldo for sinusitis  5.)  Follow-up in 4 months       Edward Hawk MD  04/07/2021

## 2021-04-08 ENCOUNTER — TELEPHONE (OUTPATIENT)
Dept: PEDIATRICS | Facility: OTHER | Age: 61
End: 2021-04-08

## 2021-04-08 RX ORDER — DOXYCYCLINE HYCLATE 100 MG/1
100 TABLET, DELAYED RELEASE ORAL 2 TIMES DAILY
Qty: 10 TABLET | Refills: 0 | Status: SHIPPED | OUTPATIENT
Start: 2021-04-08 | End: 2021-06-09

## 2021-04-08 NOTE — TELEPHONE ENCOUNTER
PATIENT WENT TO THE PHARMACY TO  MEDICATION: azithromycin (Zithromax Z-Reynaldo) 250 MG tablet THAT DR. DE LA VEGA PRESCRIBED HER YESTERDAY HOWEVER THE PHARMACIST INFORMED PATIENT THAT THIS MEDICINE CAN INTERFERE WITH HER MED escitalopram (LEXAPRO) 10 MG tablet.  IT CAN CAUSE HEART PALPITATIONS, AND DIZZINESS. SHOULD PATIENT START THE MEDICATION OR NO DOES DR. DE LA VEGA RECOMMEND SOMETHING DIFFERENT.     PHARMACY : [address] - [phone] [facility] [phone]       PATIENT CAN BE REACHED AT: [phone]

## 2021-04-09 ENCOUNTER — TELEPHONE (OUTPATIENT)
Dept: FAMILY MEDICINE CLINIC | Facility: CLINIC | Age: 61
End: 2021-04-09

## 2021-04-09 NOTE — TELEPHONE ENCOUNTER
Pharmacy Name:  [facility] [place] [id number] - [facility] [address] [facility] AT [address] [place] [address] - [phone]  - [phone] (Pharmacy)    Pharmacy representative name: SHAKIRA    Pharmacy representative phone number: [phone]    What medication are you calling in regards to: DOXYCYCLINE 100MG    What question does the pharmacy have: PHARMACY DOES NOT HAVE DELAYED RELEASE AND WANTS TO KNOW IF THEY CAN CHANGE IT TO THE REGULAR RELEASE    Who is the provider that prescribed the medication: DR DE LA VEGA    Additional notes: PLEASE ADVISE

## 2021-06-09 ENCOUNTER — OFFICE VISIT (OUTPATIENT)
Dept: CARDIOLOGY | Facility: CLINIC | Age: 61
End: 2021-06-09

## 2021-06-09 VITALS
DIASTOLIC BLOOD PRESSURE: 106 MMHG | SYSTOLIC BLOOD PRESSURE: 182 MMHG | HEIGHT: 67 IN | BODY MASS INDEX: 31.86 KG/M2 | WEIGHT: 203 LBS | HEART RATE: 51 BPM

## 2021-06-09 DIAGNOSIS — R00.2 PALPITATION: ICD-10-CM

## 2021-06-09 DIAGNOSIS — G47.33 OBSTRUCTIVE SLEEP APNEA SYNDROME: ICD-10-CM

## 2021-06-09 DIAGNOSIS — E78.5 HYPERLIPIDEMIA, UNSPECIFIED HYPERLIPIDEMIA TYPE: ICD-10-CM

## 2021-06-09 DIAGNOSIS — I10 ESSENTIAL HYPERTENSION: Primary | ICD-10-CM

## 2021-06-09 PROCEDURE — 93000 ELECTROCARDIOGRAM COMPLETE: CPT | Performed by: NURSE PRACTITIONER

## 2021-06-09 PROCEDURE — 99213 OFFICE O/P EST LOW 20 MIN: CPT | Performed by: NURSE PRACTITIONER

## 2021-06-09 RX ORDER — BISOPROLOL FUMARATE AND HYDROCHLOROTHIAZIDE 10; 6.25 MG/1; MG/1
1 TABLET ORAL DAILY
COMMUNITY
End: 2021-11-04

## 2021-06-09 RX ORDER — AMLODIPINE BESYLATE 5 MG/1
5 TABLET ORAL DAILY
Qty: 90 TABLET | Refills: 3 | Status: SHIPPED | OUTPATIENT
Start: 2021-06-09 | End: 2021-07-09

## 2021-06-09 RX ORDER — ESCITALOPRAM OXALATE 10 MG/1
10 TABLET ORAL DAILY
COMMUNITY
End: 2021-08-06 | Stop reason: SDUPTHER

## 2021-06-09 NOTE — PROGRESS NOTES
Subjective:        Bruna Jacobs is a 60 y.o. female who here for follow up    Chief Complaint   Patient presents with   • Follow-up     6 month        HPI        Bruna Jacobs is a 60-year-old female, who is current with me.  She has a history to include hypertension, hyperlipidemia, and palpitations.  Previous cardiac cath in 2019 revealed normal coronary arteries.  Echo on 11/17/2020 revealed EF 59.5%, LV diastolic function is consistent with grade 1, and no evidence of pericardial effusion.  She is currently on Repatha and a previous lipid panel on 9/22/2020 revealed , HDL 57, , and triglycerides 1/6.      The following portions of the patient's history were reviewed and updated as appropriate: allergies, current medications, past family history, past medical history, past social history, past surgical history and problem list.    Past Medical History:   Diagnosis Date   • GERD (gastroesophageal reflux disease)    • Glaucoma    • Heart murmur    • Hyperlipidemia    • Hypertension          reports that she has never smoked. She has never used smokeless tobacco. She reports current alcohol use. She reports that she does not use drugs.     Family History   Problem Relation Age of Onset   • Diabetes Mother    • Heart disease Mother    • Hypertension Mother    • Diabetes Father    • Hypertension Father    • Heart failure Father    • Heart disease Father    • Heart attack Neg Hx        ROS     Review of Systems  Constitutional: No wt loss, fever, fatigue  Gastrointestinal: No nausea, abdominal pain  Behavioral/Psych: No insomnia or anxiety  Cardiovascular: denies chests pain and shortness of breath      Objective:           Vitals and nursing note reviewed.   Constitutional:       Appearance: Well-developed.   HENT:      Head: Normocephalic.      Right Ear: External ear normal.      Left Ear: External ear normal.   Neck:      Vascular: No JVD.   Pulmonary:      Effort: Pulmonary effort is normal. No  respiratory distress.      Breath sounds: Normal breath sounds. No stridor. No rales.   Cardiovascular:      Normal rate. Regular rhythm.      No gallop.   Pulses:     Intact distal pulses.   Abdominal:      General: Bowel sounds are normal. There is no distension.      Palpations: Abdomen is soft.      Tenderness: There is no abdominal tenderness. There is no guarding.   Musculoskeletal: Normal range of motion.         General: No tenderness.      Cervical back: Normal range of motion. Skin:     General: Skin is warm.   Neurological:      Mental Status: Alert and oriented to person, place, and time.      Deep Tendon Reflexes: Reflexes are normal and symmetric.   Psychiatric:         Judgment: Judgment normal.           ECG 12 Lead    Date/Time: 6/9/2021 11:02 AM  Performed by: Nury Delcid APRN  Authorized by: Nury Delcid APRN   Comparison: compared with previous ECG from 1/3/2019  Similar to previous ECG  Rate: bradycardic  BPM: 58  ST Flattening: all    Clinical impression: non-specific ECG            Interpretation Summary    · A normal monitor study.  · NSR WITH RARE PACS, PVCS        Interpretation Summary    · Calculated left ventricular EF = 59.5% Estimated left ventricular EF was in agreement with the calculated left ventricular EF.  · Left ventricular diastolic function is consistent with (grade I) impaired relaxation.  · Calculated left ventricular EF = 59.5%  · There is no evidence of pericardial effusion        Impression:      1. Normal coronary arteries  2. Normal LVEDP     Recommendations:      1. Medical management            I sincerely appreciate the opportunity to participate in your patient's care. Please feel free to contact me anytime if I can be of assistance in this or any other way.     Graciela Boland MD  3/15/2019  8:26 AM    Interpretation Summary       · The patient reported chest discomfort, shortness of breath and exercise-limiting angina during the stress  test.  · Arrhythmias were significant.  · ECG evidence of myocardial ischemia  · Clinical evidence of myocardial ischemia. Findings consistent with an abnormal ECG stress test. Symptomatic for chest pain reported as heaviness at 3min exercise 2/10, increased to 3/10 at peak exercise.     Symptomatic for chest pain reported as heaviness at 3min exercise 2/10, increased to 3/10 at peak exercise. Patient also with complaints of SOB.  ECG is positive for ischemia: Bigeminy starting at 1:57 exercise and continued until recovery.   Ectopy: PVCs throughout stress and recovery. Bigeminy at 1:57 exercise and continued until recovery.   BP response:Appropriate  Exercise Tolerance:Poor.               Current Outpatient Medications:   •  Alirocumab (Praluent) 150 MG/ML solution auto-injector, Inject  under the skin into the appropriate area as directed Every 14 (Fourteen) Days., Disp: , Rfl:   •  aspirin  MG tablet, Take 325 mg by mouth Daily., Disp: , Rfl:   •  bisoprolol-hydrochlorothiazide (ZIAC) 10-6.25 MG per tablet, Take 1 tablet by mouth Daily., Disp: , Rfl:   •  escitalopram (LEXAPRO) 10 MG tablet, Take 10 mg by mouth Daily., Disp: , Rfl:   •  famotidine (PEPCID) 20 MG tablet, Take 20 mg by mouth 2 (Two) Times a Day., Disp: , Rfl:   •  Fexofenadine HCl (ALLEGRA ALLERGY PO), Take  by mouth., Disp: , Rfl:   •  olmesartan (Benicar) 40 MG tablet, Take 1 tablet by mouth Daily., Disp: 90 tablet, Rfl: 1  •  timolol (TIMOPTIC) 0.5 % ophthalmic solution, Administer 1 drop to both eyes 2 (Two) Times a Day., Disp: , Rfl:   •  azithromycin (Zithromax Z-Reynaldo) 250 MG tablet, Take 2 tablets by mouth on day 1, then 1 tablet daily on days 2-5, Disp: 6 tablet, Rfl: 0     Assessment:        Patient Active Problem List   Diagnosis   • Benign essential hypertension   • Fatigue   • Left carotid bruit   • Palpitations   • Hyperlipidemia   • Palpitation   • Precordial pain   • Thyroid nodule   • Heart murmur               Plan:   1.  Hypertension: Ischemic work-up as above.  She states her blood pressure has been controlled on current medications.  In current management. She will monitor her sodium intake. She states she does not take NSAIDS. She will do a blood pressure diary and document her HR and blood pressure. She will follow up in 2 weeks.     Return to ER per EMS for any recurrent symptoms including, but not limited to: chest pain/pressure/tightness, weakness, Shortness of breath, dizziness, palpitations, near syncope or syncope          Educated patient on exercising for at least 30 minutes a day for 2 to 3 days a week. Importance of controlling hypertension and blood pressure checkup on the regular basis has been explained. Hypertension as a silent killer has been discussed. Risk reduction of the weight and regular exercises to control the hypertension has been explained.      2.  Hyperlipidemia: Previous lipid panel as above.  Controlled on current management.  She states her endocrinologist monitors her cholesterol levels.    Risk of the hyperlipidemia, importance of the treatment has been explained. Pros and cons of the statins has been explained. Regular blood workup as well as side effects including the liver failure, myelopathy death has been explained.      3.  Palpitations: Controlled.  Continue current management.    4.  MAC: She states she does not use a CPAP machine.             No diagnosis found.    There are no diagnoses linked to this encounter.    COUNSELING: citlalli Price was given to patient for the following topics: diagnostic results, risk factor reductions, impressions, risks and benefits of treatment options and importance of treatment compliance .       SMOKING COUNSELING: denies    She will follow up in 2 weeks, with a blood pressure diary. I have added norvasc.    Sincerely,   JERONIMO Linton  [facility]  06/09/21  10:59 EDT     EMR Dragon/Transcription disclaimer:    Much of this encounter note is an electronic transcription/translation of spoken language to printed text. The electronic translation of spoken language may permit erroneous, or at times, nonsensical words or phrases to be inadvertently transcribed; Although I have reviewed the note for such errors, some may still exist.

## 2021-06-23 ENCOUNTER — OFFICE VISIT (OUTPATIENT)
Dept: CARDIOLOGY | Facility: CLINIC | Age: 61
End: 2021-06-23

## 2021-06-23 VITALS
HEIGHT: 67 IN | WEIGHT: 200 LBS | BODY MASS INDEX: 31.39 KG/M2 | SYSTOLIC BLOOD PRESSURE: 143 MMHG | HEART RATE: 50 BPM | DIASTOLIC BLOOD PRESSURE: 88 MMHG

## 2021-06-23 DIAGNOSIS — I10 BENIGN ESSENTIAL HYPERTENSION: Primary | ICD-10-CM

## 2021-06-23 DIAGNOSIS — E78.5 HYPERLIPIDEMIA, UNSPECIFIED HYPERLIPIDEMIA TYPE: ICD-10-CM

## 2021-06-23 PROCEDURE — 99212 OFFICE O/P EST SF 10 MIN: CPT | Performed by: NURSE PRACTITIONER

## 2021-06-23 NOTE — PROGRESS NOTES
Subjective:        Bruna Jacobs is a 61 y.o. female who here for follow up    Chief Complaint   Patient presents with   • Blood Pressure Check     2 week        HPI        Bruna Jacobs is a 61-year-old female, who is with me.  She has a history to include hypertension, hyperlipidemia, and palpitations.  Previous cardiac cath in 2019 revealed normal coronary arteries.  Echo on 11/17/2020 revealed EF 59.5%, LV diastolic function is consistent with grade 1, and no evidence of pericardial effusion.  She is currently on Repatha and a previous lipid panel on 9/22/2020 revealed , HDL 57, , and triglycerides 106.     The following portions of the patient's history were reviewed and updated as appropriate: allergies, current medications, past family history, past medical history, past social history, past surgical history and problem list.    Past Medical History:   Diagnosis Date   • GERD (gastroesophageal reflux disease)    • Glaucoma    • Heart murmur    • Hyperlipidemia    • Hypertension          reports that she has never smoked. She has never used smokeless tobacco. She reports current alcohol use. She reports that she does not use drugs.     Family History   Problem Relation Age of Onset   • Diabetes Mother    • Heart disease Mother    • Hypertension Mother    • Diabetes Father    • Hypertension Father    • Heart failure Father    • Heart disease Father    • Heart attack Neg Hx        ROS     Review of Systems  Constitutional: No wt loss, fever, fatigue  Gastrointestinal: No nausea, abdominal pain  Behavioral/Psych: No insomnia or anxiety  Cardiovascular: Denies chests pain and shortness of breath      Objective:           Vitals and nursing note reviewed.   Constitutional:       Appearance: Obese.   HENT:      Head: Normocephalic.      Right Ear: External ear normal.      Left Ear: External ear normal.   Neck:      Vascular: No JVD.   Pulmonary:      Effort: Pulmonary effort is normal. No  respiratory distress.      Breath sounds: Normal breath sounds. No stridor. No rales.   Cardiovascular:      Normal rate. Regular rhythm.      No gallop.   Pulses:     Intact distal pulses.   Abdominal:      General: Bowel sounds are normal. There is no distension.      Palpations: Abdomen is soft.      Tenderness: There is no abdominal tenderness. There is no guarding.   Musculoskeletal: Normal range of motion.         General: No tenderness.      Cervical back: Normal range of motion. Skin:     General: Skin is warm.   Neurological:      Mental Status: Alert and oriented to person, place, and time.      Deep Tendon Reflexes: Reflexes are normal and symmetric.   Psychiatric:         Judgment: Judgment normal.         Procedures    Interpretation Summary    · A normal monitor study.  · NSR WITH RARE PACS, PVCS        Interpretation Summary    · Calculated left ventricular EF = 59.5% Estimated left ventricular EF was in agreement with the calculated left ventricular EF.  · Left ventricular diastolic function is consistent with (grade I) impaired relaxation.  · Calculated left ventricular EF = 59.5%  · There is no evidence of pericardial effusion        Impression:      1. Normal coronary arteries  2. Normal LVEDP     Recommendations:      1. Medical management            I sincerely appreciate the opportunity to participate in your patient's care. Please feel free to contact me anytime if I can be of assistance in this or any other way.     Graciela Boland MD  3/15/2019  8:26 AM    Interpretation Summary       · The patient reported chest discomfort, shortness of breath and exercise-limiting angina during the stress test.  · Arrhythmias were significant.  · ECG evidence of myocardial ischemia  · Clinical evidence of myocardial ischemia. Findings consistent with an abnormal ECG stress test. Symptomatic for chest pain reported as heaviness at 3min exercise 2/10, increased to 3/10 at peak exercise.     Symptomatic  for chest pain reported as heaviness at 3min exercise 2/10, increased to 3/10 at peak exercise. Patient also with complaints of SOB.  ECG is positive for ischemia: Bigeminy starting at 1:57 exercise and continued until recovery.   Ectopy: PVCs throughout stress and recovery. Bigeminy at 1:57 exercise and continued until recovery.   BP response:Appropriate  Exercise Tolerance:Poor.               Current Outpatient Medications:   •  Alirocumab (Praluent) 150 MG/ML solution auto-injector, Inject  under the skin into the appropriate area as directed Every 14 (Fourteen) Days., Disp: , Rfl:   •  amLODIPine (NORVASC) 5 MG tablet, Take 1 tablet by mouth Daily for 30 days., Disp: 90 tablet, Rfl: 3  •  aspirin  MG tablet, Take 325 mg by mouth Daily., Disp: , Rfl:   •  azithromycin (Zithromax Z-Reynaldo) 250 MG tablet, Take 2 tablets by mouth on day 1, then 1 tablet daily on days 2-5, Disp: 6 tablet, Rfl: 0  •  escitalopram (LEXAPRO) 10 MG tablet, Take 10 mg by mouth Daily., Disp: , Rfl:   •  famotidine (PEPCID) 20 MG tablet, Take 20 mg by mouth 2 (Two) Times a Day., Disp: , Rfl:   •  olmesartan (Benicar) 40 MG tablet, Take 1 tablet by mouth Daily., Disp: 90 tablet, Rfl: 1  •  timolol (TIMOPTIC) 0.5 % ophthalmic solution, Administer 1 drop to both eyes 2 (Two) Times a Day., Disp: , Rfl:   •  bisoprolol-hydrochlorothiazide (ZIAC) 10-6.25 MG per tablet, Take 1 tablet by mouth Daily., Disp: , Rfl:      Assessment:        Patient Active Problem List   Diagnosis   • Benign essential hypertension   • Fatigue   • Left carotid bruit   • Palpitations   • Hyperlipidemia   • Palpitation   • Precordial pain   • Thyroid nodule   • Heart murmur               Plan:   1.  Hypertension: Ischemic work-up as above she states her blood pressure is controlled with the addition of amlodipine, she does have complaints of left hand pain at nighttime.  She will continue to monitor and follow-up in 1 month or sooner if needed or symptoms worsen.   Reviewed blood pressure diary and numbers are well controlled.  Continue current management.  She will continue to monitor her sodium intake and NSAID use.  We will follow-up in 1 month         Educated patient on exercising for at least 30 minutes a day for 2 to 3 days a week. Importance of controlling hypertension and blood pressure checkup on the regular basis has been explained. Hypertension as a silent killer has been discussed. Risk reduction of the weight and regular exercises to control the hypertension has been explained.      2.  Previous lipid panel as above.  Controlled on current management.  She states her endocrinologist monitors her cholesterol panel hyperlipidemia:     Risk of the hyperlipidemia, importance of the treatment has been explained. Pros and cons of the statins has been explained. Regular blood workup as well as side effects including the liver failure, myelopathy death has been explained.      3.  Palpitations: Controlled    4.  MAC: she states she does not use a CPAP machine.             No diagnosis found.    There are no diagnoses linked to this encounter.    COUNSELING: citlalli Price was given to patient for the following topics: diagnostic results, risk factor reductions, impressions, risks and benefits of treatment options and importance of treatment compliance .       SMOKING COUNSELING: denies    She will follow-up in 1 month, unless she needs to be seen sooner.    Sincerely,   JERONIMO Linton  [facility]  06/23/21  09:08 EDT     EMR Dragon/Transcription disclaimer:   Much of this encounter note is an electronic transcription/translation of spoken language to printed text. The electronic translation of spoken language may permit erroneous, or at times, nonsensical words or phrases to be inadvertently transcribed; Although I have reviewed the note for such errors, some may still exist.

## 2021-08-06 ENCOUNTER — OFFICE VISIT (OUTPATIENT)
Dept: FAMILY MEDICINE CLINIC | Facility: CLINIC | Age: 61
End: 2021-08-06

## 2021-08-06 VITALS
OXYGEN SATURATION: 99 % | DIASTOLIC BLOOD PRESSURE: 88 MMHG | BODY MASS INDEX: 31.51 KG/M2 | HEART RATE: 57 BPM | WEIGHT: 201.2 LBS | TEMPERATURE: 98.6 F | SYSTOLIC BLOOD PRESSURE: 150 MMHG

## 2021-08-06 DIAGNOSIS — I10 ESSENTIAL HYPERTENSION: ICD-10-CM

## 2021-08-06 DIAGNOSIS — F41.9 ANXIETY: Chronic | ICD-10-CM

## 2021-08-06 DIAGNOSIS — F41.9 ANXIETY: ICD-10-CM

## 2021-08-06 DIAGNOSIS — E78.00 PURE HYPERCHOLESTEROLEMIA: Primary | ICD-10-CM

## 2021-08-06 DIAGNOSIS — I10 ESSENTIAL HYPERTENSION: Primary | Chronic | ICD-10-CM

## 2021-08-06 DIAGNOSIS — E78.00 PURE HYPERCHOLESTEROLEMIA: Chronic | ICD-10-CM

## 2021-08-06 PROCEDURE — 99213 OFFICE O/P EST LOW 20 MIN: CPT | Performed by: INTERNAL MEDICINE

## 2021-08-06 RX ORDER — ESCITALOPRAM OXALATE 10 MG/1
10 TABLET ORAL DAILY
Qty: 90 TABLET | Refills: 3 | Status: SHIPPED | OUTPATIENT
Start: 2021-08-06 | End: 2023-01-03

## 2021-08-06 NOTE — PROGRESS NOTES
08/06/2021    CC: Hypertension (4 month follow up)  .        HPI  Hypertension  This is a chronic problem. The current episode started more than 1 year ago. The problem has been resolved since onset. The problem is controlled. Associated symptoms include anxiety. There are no associated agents to hypertension. There are no known risk factors for coronary artery disease.        Ronak Jacobs is a 61 y.o. female.      The following portions of the patient's history were reviewed and updated as appropriate: allergies, current medications, past family history, past medical history, past social history, past surgical history and problem list.    Problem List  Patient Active Problem List   Diagnosis   • Benign essential hypertension   • Fatigue   • Left carotid bruit   • Palpitations   • Hyperlipidemia   • Palpitation   • Precordial pain   • Thyroid nodule   • Heart murmur       Past Medical History  Past Medical History:   Diagnosis Date   • GERD (gastroesophageal reflux disease)    • Glaucoma    • Heart murmur    • Hyperlipidemia    • Hypertension        Surgical History  Past Surgical History:   Procedure Laterality Date   • CARDIAC CATHETERIZATION N/A 3/15/2019    Procedure: Left Heart Cath;  Surgeon: Graciela Boland MD;  Location: [facility] CATH INVASIVE LOC[facility];  Service: Cardiology   • CARDIAC CATHETERIZATION N/A 3/15/2019    Procedure: Coronary angiography;  Surgeon: Graciela Boland MD;  Location:  [facility] CATH INVASIVE LOCATION;  Service: Cardiology   • HYSTERECTOMY     • THYROIDECTOMY, PARTIAL         Family History  Family History   Problem Relation Age of Onset   • Diabetes Mother    • Heart disease Mother    • Hypertension Mother    • Diabetes Father    • Hypertension Father    • Heart failure Father    • Heart disease Father    • Heart attack Neg Hx        Social History  Social History    Tobacco Use      Smoking status: Never Smoker      Smokeless tobacco: Never Used       Is the  Patient a current tobacco user? No    Allergies  Allergies   Allergen Reactions   • Atorvastatin Myalgia   • Niacin Unknown - Low Severity     CANNOT REMEMBER   • Rosuvastatin Myalgia   • Statins Myalgia       Current Medications    Current Outpatient Medications:   •  Alirocumab (Praluent) 150 MG/ML solution auto-injector, Inject  under the skin into the appropriate area as directed Every 14 (Fourteen) Days., Disp: , Rfl:   •  aspirin  MG tablet, Take 325 mg by mouth Daily., Disp: , Rfl:   •  bisoprolol-hydrochlorothiazide (ZIAC) 10-6.25 MG per tablet, Take 1 tablet by mouth Daily., Disp: , Rfl:   •  famotidine (PEPCID) 20 MG tablet, Take 20 mg by mouth 2 (Two) Times a Day., Disp: , Rfl:   •  olmesartan (Benicar) 40 MG tablet, Take 1 tablet by mouth Daily., Disp: 90 tablet, Rfl: 1  •  timolol (TIMOPTIC) 0.5 % ophthalmic solution, Administer 1 drop to both eyes 2 (Two) Times a Day., Disp: , Rfl:   •  azithromycin (Zithromax Z-Reynaldo) 250 MG tablet, Take 2 tablets by mouth on day 1, then 1 tablet daily on days 2-5, Disp: 6 tablet, Rfl: 0  •  escitalopram (LEXAPRO) 10 MG tablet, Take 1 tablet by mouth Daily., Disp: 90 tablet, Rfl: 3     Review of System  Review of Systems   Eyes: Negative.    Respiratory: Negative.    Cardiovascular: Negative.    Gastrointestinal: Negative.      I have reviewed and confirmed the accuracy of the ROS as documented by the MA/LPN/RN Edward Hawk MD    Vitals:    08/06/21 0827   BP: 150/88   Pulse: 57   Temp: 98.6 °F (37 °C)   SpO2: 99%     Body mass index is 31.51 kg/m².    Objective     Physical Exam  Physical Exam  Cardiovascular:      Rate and Rhythm: Normal rate and regular rhythm.      Pulses: Normal pulses.      Heart sounds: Normal heart sounds.   Pulmonary:      Effort: Pulmonary effort is normal.      Breath sounds: Normal breath sounds.   Abdominal:      General: Abdomen is flat.   Musculoskeletal:      Cervical back: Normal range of motion.         Assessment/Plan     This 61-year-old presents at this time for follow-up of hypertension.  Her blood pressure was excellent today at 126/80 in the left arm sitting position standard cuff.  She relates that she was changed to amlodipine by her cardiologist several weeks ago.  She also has a history of anxiety that has been well controlled with Lexapro 10 mg p.o. daily.  She relates that in the interim of visit she has done well.      Her anxiety state is well controlled on the Lexapro.    Diagnoses and all orders for this visit:    1. Essential hypertension (Primary)    2. Anxiety    3. Pure hypercholesterolemia      Plan:  1.)  Follow-up and 2 to 3 weeks for more physical examination.  2.)  Renew Lexapro 10 mg 1 tab p.o. daily.       Edward Hawk MD  08/06/2021

## 2021-08-07 LAB
ALBUMIN SERPL-MCNC: 4.8 G/DL (ref 3.5–5.2)
ALBUMIN/GLOB SERPL: 1.6 G/DL
ALP SERPL-CCNC: 100 U/L (ref 39–117)
ALT SERPL-CCNC: 19 U/L (ref 1–33)
AST SERPL-CCNC: 24 U/L (ref 1–32)
BASOPHILS # BLD AUTO: NORMAL 10*3/UL
BASOPHILS # BLD MANUAL: 0.05 10*3/MM3 (ref 0–0.2)
BASOPHILS NFR BLD MANUAL: 1.1 % (ref 0–1.5)
BILIRUB SERPL-MCNC: 0.4 MG/DL (ref 0–1.2)
BUN SERPL-MCNC: 8 MG/DL (ref 8–23)
BUN/CREAT SERPL: 12.1 (ref 7–25)
CALCIUM SERPL-MCNC: 9.6 MG/DL (ref 8.6–10.5)
CHLORIDE SERPL-SCNC: 104 MMOL/L (ref 98–107)
CHOLEST SERPL-MCNC: 185 MG/DL (ref 0–200)
CHOLEST/HDLC SERPL: 2.89 {RATIO}
CO2 SERPL-SCNC: 28.9 MMOL/L (ref 22–29)
CREAT SERPL-MCNC: 0.66 MG/DL (ref 0.57–1)
DIFFERENTIAL COMMENT: ABNORMAL
EOSINOPHIL # BLD AUTO: NORMAL 10*3/UL
EOSINOPHIL NFR BLD AUTO: NORMAL %
ERYTHROCYTE [DISTWIDTH] IN BLOOD BY AUTOMATED COUNT: 14.9 % (ref 12.3–15.4)
GLOBULIN SER CALC-MCNC: 3 GM/DL
GLUCOSE SERPL-MCNC: 109 MG/DL (ref 65–99)
HCT VFR BLD AUTO: 40.7 % (ref 34–46.6)
HDLC SERPL-MCNC: 64 MG/DL (ref 40–60)
HGB BLD-MCNC: 13.1 G/DL (ref 12–15.9)
LDLC SERPL CALC-MCNC: 98 MG/DL (ref 0–100)
LYMPHOCYTES # BLD AUTO: NORMAL 10*3/UL
LYMPHOCYTES # BLD MANUAL: 2.17 10*3/MM3 (ref 0.7–3.1)
LYMPHOCYTES NFR BLD AUTO: NORMAL %
LYMPHOCYTES NFR BLD MANUAL: 50.6 % (ref 19.6–45.3)
MCH RBC QN AUTO: 29.6 PG (ref 26.6–33)
MCHC RBC AUTO-ENTMCNC: 32.2 G/DL (ref 31.5–35.7)
MCV RBC AUTO: 92.1 FL (ref 79–97)
MONOCYTES # BLD MANUAL: 0.28 10*3/MM3 (ref 0.1–0.9)
MONOCYTES NFR BLD AUTO: NORMAL %
MONOCYTES NFR BLD MANUAL: 6.7 % (ref 5–12)
NEUTROPHILS # BLD MANUAL: 1.62 10*3/MM3 (ref 1.7–7)
NEUTROPHILS NFR BLD AUTO: NORMAL %
NEUTROPHILS NFR BLD MANUAL: 39.3 % (ref 42.7–76)
PLATELET # BLD AUTO: 253 10*3/MM3 (ref 140–450)
PLATELET BLD QL SMEAR: ABNORMAL
POTASSIUM SERPL-SCNC: 4.6 MMOL/L (ref 3.5–5.2)
PROT SERPL-MCNC: 7.8 G/DL (ref 6–8.5)
RBC # BLD AUTO: 4.42 10*6/MM3 (ref 3.77–5.28)
RBC MORPH BLD: ABNORMAL
SODIUM SERPL-SCNC: 143 MMOL/L (ref 136–145)
TRIGL SERPL-MCNC: 132 MG/DL (ref 0–150)
VLDLC SERPL CALC-MCNC: 23 MG/DL (ref 5–40)
WBC # BLD AUTO: 4.11 10*3/MM3 (ref 3.4–10.8)

## 2021-08-10 ENCOUNTER — OFFICE VISIT (OUTPATIENT)
Dept: CARDIOLOGY | Facility: CLINIC | Age: 61
End: 2021-08-10

## 2021-08-10 VITALS
BODY MASS INDEX: 31.39 KG/M2 | WEIGHT: 200 LBS | SYSTOLIC BLOOD PRESSURE: 136 MMHG | HEART RATE: 58 BPM | HEIGHT: 67 IN | DIASTOLIC BLOOD PRESSURE: 85 MMHG

## 2021-08-10 DIAGNOSIS — E78.5 HYPERLIPIDEMIA, UNSPECIFIED HYPERLIPIDEMIA TYPE: Primary | ICD-10-CM

## 2021-08-10 DIAGNOSIS — R00.2 PALPITATIONS: ICD-10-CM

## 2021-08-10 DIAGNOSIS — I10 ESSENTIAL HYPERTENSION: ICD-10-CM

## 2021-08-10 DIAGNOSIS — G47.33 OBSTRUCTIVE SLEEP APNEA SYNDROME: ICD-10-CM

## 2021-08-10 PROCEDURE — 99212 OFFICE O/P EST SF 10 MIN: CPT | Performed by: NURSE PRACTITIONER

## 2021-08-10 NOTE — PROGRESS NOTES
Subjective:        Bruna Jacobs is a 61 y.o. female who here for follow up    Chief Complaint   Patient presents with   • Follow-up     1 month       HPI  This is a 61-year-old female, who is current with me.  She has a history to include hypertension, hyperlipidemia, and palpitations.  Previous cardiac cath in 2019 revealed normal coronary arteries.  Echo on 11/17/2020 revealed EF 59.5%, LV diastolic function is consistent with grade 1, and no evidence of pericardial effusion.  She is currently on Repatha and previous lipid panel on 8/6/2021 revealed , HDL 64, LDL 98, and triglycerides 132.  Better coverage noted.            The following portions of the patient's history were reviewed and updated as appropriate: allergies, current medications, past family history, past medical history, past social history, past surgical history and problem list.    Past Medical History:   Diagnosis Date   • GERD (gastroesophageal reflux disease)    • Glaucoma    • Heart murmur    • Hyperlipidemia    • Hypertension          reports that she has never smoked. She has never used smokeless tobacco. She reports current alcohol use. She reports that she does not use drugs.     Family History   Problem Relation Age of Onset   • Diabetes Mother    • Heart disease Mother    • Hypertension Mother    • Diabetes Father    • Hypertension Father    • Heart failure Father    • Heart disease Father    • Heart attack Neg Hx        ROS     Review of Systems  Constitutional: No wt loss, fever, fatigue  Gastrointestinal: No nausea, abdominal pain  Behavioral/Psych: No insomnia or anxiety  Cardiovascular: Denies chests pain and shortness of breath      Objective:           Vitals and nursing note reviewed.   Constitutional:       Appearance: Well-developed.   HENT:      Head: Normocephalic.      Right Ear: External ear normal.      Left Ear: External ear normal.   Neck:      Vascular: No JVD.   Pulmonary:      Effort: Pulmonary effort is  normal. No respiratory distress.      Breath sounds: Normal breath sounds. No stridor. No rales.   Cardiovascular:      Normal rate. Regular rhythm.      No gallop.   Pulses:     Intact distal pulses.   Abdominal:      General: Bowel sounds are normal. There is no distension.      Palpations: Abdomen is soft.      Tenderness: There is no abdominal tenderness. There is no guarding.   Musculoskeletal: Normal range of motion.         General: No tenderness.      Cervical back: Normal range of motion. Skin:     General: Skin is warm.   Neurological:      Mental Status: Alert and oriented to person, place, and time.      Deep Tendon Reflexes: Reflexes are normal and symmetric.   Psychiatric:         Judgment: Judgment normal.         Procedures    Interpretation Summary    · A normal monitor study.  · NSR WITH RARE PACS, PVCS        Interpretation Summary    · Calculated left ventricular EF = 59.5% Estimated left ventricular EF was in agreement with the calculated left ventricular EF.  · Left ventricular diastolic function is consistent with (grade I) impaired relaxation.  · Calculated left ventricular EF = 59.5%  · There is no evidence of pericardial effusion        Impression:      1. Normal coronary arteries  2. Normal LVEDP     Recommendations:      1. Medical management            I sincerely appreciate the opportunity to participate in your patient's care. Please feel free to contact me anytime if I can be of assistance in this or any other way.     Graciela Boland MD  3/15/2019  8:26 AM    Interpretation Summary       · The patient reported chest discomfort, shortness of breath and exercise-limiting angina during the stress test.  · Arrhythmias were significant.  · ECG evidence of myocardial ischemia  · Clinical evidence of myocardial ischemia. Findings consistent with an abnormal ECG stress test. Symptomatic for chest pain reported as heaviness at 3min exercise 2/10, increased to 3/10 at peak exercise.      Symptomatic for chest pain reported as heaviness at 3min exercise 2/10, increased to 3/10 at peak exercise. Patient also with complaints of SOB.  ECG is positive for ischemia: Bigeminy starting at 1:57 exercise and continued until recovery.   Ectopy: PVCs throughout stress and recovery. Bigeminy at 1:57 exercise and continued until recovery.   BP response:Appropriate  Exercise Tolerance:Poor.               Current Outpatient Medications:   •  Alirocumab (Praluent) 150 MG/ML solution auto-injector, Inject  under the skin into the appropriate area as directed Every 14 (Fourteen) Days., Disp: , Rfl:   •  aspirin  MG tablet, Take 325 mg by mouth Daily., Disp: , Rfl:   •  bisoprolol-hydrochlorothiazide (ZIAC) 10-6.25 MG per tablet, Take 1 tablet by mouth Daily., Disp: , Rfl:   •  escitalopram (LEXAPRO) 10 MG tablet, Take 1 tablet by mouth Daily., Disp: 90 tablet, Rfl: 3  •  famotidine (PEPCID) 20 MG tablet, Take 20 mg by mouth 2 (Two) Times a Day As Needed., Disp: , Rfl:   •  olmesartan (Benicar) 40 MG tablet, Take 1 tablet by mouth Daily., Disp: 90 tablet, Rfl: 1  •  timolol (TIMOPTIC) 0.5 % ophthalmic solution, Administer 1 drop to both eyes 2 (Two) Times a Day., Disp: , Rfl:   •  VITAMIN D PO, Take  by mouth., Disp: , Rfl:      Assessment:        Patient Active Problem List   Diagnosis   • Benign essential hypertension   • Fatigue   • Left carotid bruit   • Palpitations   • Hyperlipidemia   • Palpitation   • Precordial pain   • Thyroid nodule   • Heart murmur               Plan:   1.  Hypertension: Her blood pressures are much better controlled.  She states she has started walking. She states she is doing well with her plan. Continue.       Educated patient on exercising for at least 30 minutes a day for 2 to 3 days a week. Importance of controlling hypertension and blood pressure checkup on the regular basis has been explained. Hypertension as a silent killer has been discussed. Risk reduction of the weight  and regular exercises to control the hypertension has been explained.    3.  Hyperlipidemia: Lipid panel as above.  Controlled on current medications.  She states her endocrinologist monitors her cholesterol panel.  Reviewed labs with patient.    Risk of the hyperlipidemia, importance of the treatment has been explained. Pros and cons of the statins has been explained. Regular blood workup as well as side effects including the liver failure, myelopathy death has been explained.    3.  Palpitations: Controlled.    4.  MAC: She states she does not use her CPAP machine.         No diagnosis found.    There are no diagnoses linked to this encounter.    COUNSELING: citlalli Price was given to patient for the following topics: diagnostic results, risk factor reductions, impressions, risks and benefits of treatment options and importance of treatment compliance .       SMOKING COUNSELING: denies    She will follow in 6 months, when she needs to see be seen sooner    Sincerely,   JERONIMO Linton  [facility]  08/10/21  10:05 EDT     EMR Dragon/Transcription disclaimer:   Much of this encounter note is an electronic transcription/translation of spoken language to printed text. The electronic translation of spoken language may permit erroneous, or at times, nonsensical words or phrases to be inadvertently transcribed; Although I have reviewed the note for such errors, some may still exist.

## 2021-09-14 ENCOUNTER — OFFICE VISIT (OUTPATIENT)
Dept: FAMILY MEDICINE CLINIC | Facility: CLINIC | Age: 61
End: 2021-09-14

## 2021-09-14 VITALS
RESPIRATION RATE: 16 BRPM | HEIGHT: 67 IN | WEIGHT: 199.6 LBS | DIASTOLIC BLOOD PRESSURE: 78 MMHG | BODY MASS INDEX: 31.33 KG/M2 | SYSTOLIC BLOOD PRESSURE: 130 MMHG

## 2021-09-14 DIAGNOSIS — I10 ESSENTIAL HYPERTENSION: Primary | Chronic | ICD-10-CM

## 2021-09-14 PROCEDURE — 99213 OFFICE O/P EST LOW 20 MIN: CPT | Performed by: INTERNAL MEDICINE

## 2021-09-14 RX ORDER — FLUTICASONE PROPIONATE 50 MCG
2 SPRAY, SUSPENSION (ML) NASAL AS NEEDED
COMMUNITY

## 2021-09-14 NOTE — PROGRESS NOTES
09/14/2021    CC: Hypertension (f/u.  swelling in feet.  achiness in hands.  )  .        HPI  Hypertension  This is a chronic problem. The current episode started more than 1 year ago. The problem has been rapidly improving since onset. The problem is controlled. Associated symptoms include anxiety.        Ronak Jacobs is a 61 y.o. female.      The following portions of the patient's history were reviewed and updated as appropriate: allergies, current medications, past family history, past medical history, past social history, past surgical history and problem list.    Problem List  Patient Active Problem List   Diagnosis   • Benign essential hypertension   • Fatigue   • Left carotid bruit   • Palpitations   • Hyperlipidemia   • Palpitation   • Precordial pain   • Thyroid nodule   • Heart murmur       Past Medical History  Past Medical History:   Diagnosis Date   • GERD (gastroesophageal reflux disease)    • Glaucoma    • Heart murmur    • Hyperlipidemia    • Hypertension        Surgical History  Past Surgical History:   Procedure Laterality Date   • CARDIAC CATHETERIZATION N/A 3/15/2019    Procedure: Left Heart Cath;  Surgeon: Graciela Boland MD;  Location: [facility] CATH INVASIVE LOCATION;  Service: Cardiology   • CARDIAC CATHETERIZATION N/A 3/15/2019    Procedure: Coronary angiography;  Surgeon: Graciela Boland MD;  Location:  [facility] CATH INVASIVE LOCATION;  Service: Cardiology   • HYSTERECTOMY     • THYROIDECTOMY, PARTIAL         Family History  Family History   Problem Relation Age of Onset   • Diabetes Mother    • Heart disease Mother    • Hypertension Mother    • Diabetes Father    • Hypertension Father    • Heart failure Father    • Heart disease Father    • Heart attack Neg Hx        Social History  Social History    Tobacco Use      Smoking status: Never Smoker      Smokeless tobacco: Never Used       Is the Patient a current tobacco user? No    Allergies  Allergies   Allergen  Reactions   • Atorvastatin Myalgia   • Niacin Unknown - Low Severity     CANNOT REMEMBER   • Rosuvastatin Myalgia   • Statins Myalgia       Current Medications    Current Outpatient Medications:   •  Alirocumab (Praluent) 150 MG/ML solution auto-injector, Inject  under the skin into the appropriate area as directed Every 14 (Fourteen) Days., Disp: , Rfl:   •  aspirin  MG tablet, Take 325 mg by mouth Daily., Disp: , Rfl:   •  bisoprolol-hydrochlorothiazide (ZIAC) 10-6.25 MG per tablet, Take 1 tablet by mouth Daily., Disp: , Rfl:   •  escitalopram (LEXAPRO) 10 MG tablet, Take 1 tablet by mouth Daily., Disp: 90 tablet, Rfl: 3  •  famotidine (PEPCID) 20 MG tablet, Take 20 mg by mouth 2 (Two) Times a Day As Needed., Disp: , Rfl:   •  fluticasone (FLONASE) 50 MCG/ACT nasal spray, 2 sprays into the nostril(s) as directed by provider Daily., Disp: , Rfl:   •  timolol (TIMOPTIC) 0.5 % ophthalmic solution, Administer 1 drop to both eyes 2 (Two) Times a Day., Disp: , Rfl:   •  VITAMIN D PO, Take  by mouth., Disp: , Rfl:      Review of System  Review of Systems   HENT: Negative.    Eyes: Negative.    Respiratory: Negative.    Cardiovascular: Negative.    Gastrointestinal: Negative.    Endocrine: Negative.      I have reviewed and confirmed the accuracy of the ROS as documented by the MA/LPN/RN Edward Hawk MD    Vitals:    09/14/21 0801   BP: 130/78   Resp: 16     Body mass index is 31.26 kg/m².    Objective     Physical Exam  Physical Exam  Cardiovascular:      Rate and Rhythm: Normal rate and regular rhythm.      Pulses: Normal pulses.      Heart sounds: Normal heart sounds.   Abdominal:      Palpations: Abdomen is soft.   Musculoskeletal:      Cervical back: Normal range of motion and neck supple.         Assessment/Plan        This pleasant 61-year-old presents at this time for follow-up of hypertension.  In the interim of visit she relates that she was seen by Dr. Miranda, endocrinologist who took her off her  Tiazac.  She was then subsequently seen by Dr. Boland who put her on amlodipine 5 mg.  Note is made that her blood pressure is excellent today at 130/78 in the left arm sitting position standard cuff.  Her previous level 1 month before was 136/85.    Patient had questions regarding her blood test which were done on 8/6 which showed a normal  normal potassium level normal sodium and normal hepatic profile.  Her glucose level slightly elevated at 109 I do not think this significant.        Diagnoses and all orders for this visit:    1. Essential hypertension (Primary)      Plan:  1.)  Continue current amlodipine 5 mg p.o. daily.  2.)  Follow-up and 2 to 3 months for a physical examination.       Edward Hawk MD  09/14/2021

## 2021-09-21 DIAGNOSIS — I10 BENIGN ESSENTIAL HYPERTENSION: Chronic | ICD-10-CM

## 2021-09-21 RX ORDER — OLMESARTAN MEDOXOMIL 40 MG/1
TABLET ORAL
Qty: 90 TABLET | Refills: 1 | Status: SHIPPED | OUTPATIENT
Start: 2021-09-21 | End: 2022-02-10

## 2021-09-21 NOTE — TELEPHONE ENCOUNTER
Rx Refill Note  Requested Prescriptions     Pending Prescriptions Disp Refills   • olmesartan (BENICAR) 40 MG tablet [Pharmacy Med Name: OLMESARTAN MEDOXOMIL 40 MG TAB] 90 tablet 1     Sig: TAKE ONE TABLET BY MOUTH DAILY      Last office visit with prescribing clinician: 9/14/2021      Next office visit with prescribing clinician: 11/4/2021            Javan Carmichael MA  09/21/21, 07:04 EDT

## 2021-11-04 ENCOUNTER — OFFICE VISIT (OUTPATIENT)
Dept: FAMILY MEDICINE CLINIC | Facility: CLINIC | Age: 61
End: 2021-11-04

## 2021-11-04 VITALS
HEIGHT: 67 IN | BODY MASS INDEX: 30.48 KG/M2 | WEIGHT: 194.2 LBS | SYSTOLIC BLOOD PRESSURE: 110 MMHG | DIASTOLIC BLOOD PRESSURE: 72 MMHG

## 2021-11-04 DIAGNOSIS — I10 PRIMARY HYPERTENSION: Chronic | ICD-10-CM

## 2021-11-04 DIAGNOSIS — K21.9 GASTROESOPHAGEAL REFLUX DISEASE WITHOUT ESOPHAGITIS: Chronic | ICD-10-CM

## 2021-11-04 DIAGNOSIS — F41.9 ANXIETY: Primary | Chronic | ICD-10-CM

## 2021-11-04 DIAGNOSIS — M54.50 ACUTE MIDLINE LOW BACK PAIN WITHOUT SCIATICA: ICD-10-CM

## 2021-11-04 PROCEDURE — 99396 PREV VISIT EST AGE 40-64: CPT | Performed by: INTERNAL MEDICINE

## 2021-11-04 RX ORDER — CYCLOBENZAPRINE HCL 10 MG
10 TABLET ORAL
Qty: 10 TABLET | Refills: 0 | Status: SHIPPED | OUTPATIENT
Start: 2021-11-04 | End: 2021-11-04

## 2021-11-04 RX ORDER — AMLODIPINE BESYLATE 5 MG/1
TABLET ORAL
COMMUNITY
Start: 2021-09-14 | End: 2022-06-28

## 2021-11-04 RX ORDER — CYCLOBENZAPRINE HCL 10 MG
10 TABLET ORAL
Qty: 10 TABLET | Refills: 0 | Status: SHIPPED | OUTPATIENT
Start: 2021-11-04 | End: 2021-11-14

## 2021-11-04 NOTE — PROGRESS NOTES
11/04/2021    CC: Annual Exam (woke up this morning w/SOA)  .        HPI  Back Pain  This is a new problem. The current episode started today. The problem occurs intermittently. The problem is unchanged. The pain is present in the thoracic spine. The quality of the pain is described as cramping and shooting. The pain is moderate. The symptoms are aggravated by bending and twisting.        Ronak Jacobs is a 61 y.o. female.      The following portions of the patient's history were reviewed and updated as appropriate: allergies, current medications, past family history, past medical history, past social history, past surgical history and problem list.    Problem List  Patient Active Problem List   Diagnosis   • Benign essential hypertension   • Fatigue   • Left carotid bruit   • Palpitations   • Hyperlipidemia   • Palpitation   • Precordial pain   • Thyroid nodule   • Heart murmur       Past Medical History  Past Medical History:   Diagnosis Date   • GERD (gastroesophageal reflux disease)    • Glaucoma    • Heart murmur    • Hyperlipidemia    • Hypertension        Surgical History  Past Surgical History:   Procedure Laterality Date   • CARDIAC CATHETERIZATION N/A 3/15/2019    Procedure: Left Heart Cath;  Surgeon: Graciela Boland MD;  Location: [facility] INVASIVE LOCATION;  Service: Cardiology   • CARDIAC CATHETERIZATION N/A 3/15/2019    Procedure: Coronary angiography;  Surgeon: Graciela Boland MD;  Location: [facility] INVASIVE LOCATION;  Service: Cardiology   • HYSTERECTOMY     • THYROIDECTOMY, PARTIAL         Family History  Family History   Problem Relation Age of Onset   • Diabetes Mother    • Heart disease Mother    • Hypertension Mother    • Diabetes Father    • Hypertension Father    • Heart failure Father    • Heart disease Father    • Heart attack Neg Hx        Social History  Social History    Tobacco Use      Smoking status: Never Smoker      Smokeless tobacco: Never Used        Is the Patient a current tobacco user? No    Allergies  Allergies   Allergen Reactions   • Atorvastatin Myalgia   • Niacin Unknown - Low Severity     CANNOT REMEMBER   • Rosuvastatin Myalgia   • Statins Myalgia       Current Medications    Current Outpatient Medications:   •  Alirocumab (Praluent) 150 MG/ML solution auto-injector, Inject  under the skin into the appropriate area as directed Every 14 (Fourteen) Days., Disp: , Rfl:   •  amLODIPine (NORVASC) 5 MG tablet, , Disp: , Rfl:   •  aspirin  MG tablet, Take 325 mg by mouth Daily., Disp: , Rfl:   •  escitalopram (LEXAPRO) 10 MG tablet, Take 1 tablet by mouth Daily., Disp: 90 tablet, Rfl: 3  •  famotidine (PEPCID) 20 MG tablet, Take 20 mg by mouth 2 (Two) Times a Day As Needed., Disp: , Rfl:   •  fluticasone (FLONASE) 50 MCG/ACT nasal spray, 2 sprays into the nostril(s) as directed by provider Daily., Disp: , Rfl:   •  olmesartan (BENICAR) 40 MG tablet, TAKE ONE TABLET BY MOUTH DAILY, Disp: 90 tablet, Rfl: 1  •  timolol (TIMOPTIC) 0.5 % ophthalmic solution, Administer 1 drop to both eyes 2 (Two) Times a Day., Disp: , Rfl:   •  VITAMIN D PO, Take  by mouth., Disp: , Rfl:   •  cyclobenzaprine (FLEXERIL) 10 MG tablet, Take 1 tablet by mouth every night at bedtime for 10 days., Disp: 10 tablet, Rfl: 0     Review of System  Review of Systems   Constitutional: Negative.    HENT: Negative.    Eyes: Negative.    Respiratory: Negative.    Cardiovascular: Negative.    Gastrointestinal: Negative.    Endocrine: Negative.    Genitourinary: Negative.    Musculoskeletal: Positive for back pain.   Skin: Negative.    Allergic/Immunologic: Negative.    Neurological: Negative.    Hematological: Negative.    Psychiatric/Behavioral: Negative.      I have reviewed and confirmed the accuracy of the ROS as documented by the MA/LPN/RN Edward Hawk MD    Vitals:    11/04/21 0733   BP: 110/72     Body mass index is 30.42 kg/m².    Objective     Physical Exam  Physical  Exam  Vitals and nursing note reviewed.   Constitutional:       Appearance: She is well-developed.   HENT:      Head: Normocephalic and atraumatic.   Eyes:      Conjunctiva/sclera: Conjunctivae normal.   Cardiovascular:      Rate and Rhythm: Normal rate and regular rhythm.      Heart sounds: Normal heart sounds.   Pulmonary:      Effort: Pulmonary effort is normal.      Breath sounds: Normal breath sounds.   Abdominal:      General: Bowel sounds are normal.      Palpations: Abdomen is soft.   Musculoskeletal:         General: Normal range of motion.      Cervical back: Normal range of motion and neck supple.      Comments: Lumbar sacral and thoracolumbar muscle spasms along the left.  Straight leg raising was normal.   Skin:     General: Skin is warm and dry.   Neurological:      Mental Status: She is alert and oriented to person, place, and time.   Psychiatric:         Behavior: Behavior normal.         Assessment/Plan      This 61-year-old patient presents at this time for physical examination.  She relates that this morning upon arising she had left back pain.  She denies any recent trauma to the back but she relates that the pain is worse with twisting or bending over when she takes deep breaths.  Physical examination demonstrates lumbar sacral and thoracolumbar muscle spasm.      She is on a weight loss diet and indeed is lost 5 pounds since her last visit about 2 months ago.  .      I reviewed the patient's labs from August 2021 which showed an essentially normal comprehensive metabolic profile, CBC and lipid profile.    Diagnoses and all orders for this visit:    1. Anxiety (Primary)  Comments:  Stable    2. Primary hypertension  Comments:  Controlled    3. Gastroesophageal reflux disease without esophagitis  Comments:  Stable    4. Acute midline low back pain without sciatica    Other orders  -     cyclobenzaprine (FLEXERIL) 10 MG tablet; Take 1 tablet by mouth every night at bedtime for 10 days.  Dispense:  10 tablet; Refill: 0      Plan:  1.)  Lexapro 10 mg 1 tab p.o. nightly  2.)  ThermaCare heating pad to be applied to the left lumbar sacral/thoracolumbar area.  2.)  If the pain is no better in the next few days she is to notify me.  3.)  Follow-up in about 5 months.       Edward Hawk MD  11/04/2021

## 2021-12-06 ENCOUNTER — TELEPHONE (OUTPATIENT)
Dept: FAMILY MEDICINE CLINIC | Facility: CLINIC | Age: 61
End: 2021-12-06

## 2021-12-06 NOTE — TELEPHONE ENCOUNTER
Hub please inform patient      Our records indicate that you are overdue for your mammogram.  Please let us know if we can get one scheduled for you.  If you have had a mammogram done elsewhere, please let us know the name of the facility so we can update our records.    Thank you!      No answer.  Mailbox full.  Unable to leave a message.

## 2021-12-06 NOTE — TELEPHONE ENCOUNTER
PT  RETURNED CALL, RELAYED MESSAGE ABOUT MAMMO GRAM, HE IS GOING TO HAVE HIS WIFE CALL WITH WHETHER OR NOT SHE WANTS A MAMMOGRAM.

## 2022-01-13 ENCOUNTER — CLINICAL SUPPORT (OUTPATIENT)
Dept: FAMILY MEDICINE CLINIC | Facility: CLINIC | Age: 62
End: 2022-01-13

## 2022-01-13 DIAGNOSIS — Z23 NEED FOR INFLUENZA VACCINATION: Primary | ICD-10-CM

## 2022-01-13 PROCEDURE — 90686 IIV4 VACC NO PRSV 0.5 ML IM: CPT | Performed by: INTERNAL MEDICINE

## 2022-01-13 PROCEDURE — 90471 IMMUNIZATION ADMIN: CPT | Performed by: INTERNAL MEDICINE

## 2022-02-08 ENCOUNTER — TELEPHONE (OUTPATIENT)
Dept: FAMILY MEDICINE CLINIC | Facility: CLINIC | Age: 62
End: 2022-02-08

## 2022-02-08 NOTE — TELEPHONE ENCOUNTER
Caller: Bruna Jacobs    Relationship: Self    Best call back number:[phone]    What medication are you requesting: ZPAK, ANTIBIOTIC EARDROPS    What are your current symptoms: EAR ACHE,HEARING A  WOOOSHING SOUND      How long have you been experiencing symptoms: CPLE DAYS     Have you had these symptoms before:    [] Yes  [x] No    Have you been treated for these symptoms before:   [] Yes  [x] No    If a prescription is needed, what is your preferred pharmacy and phone number: [facility] [address] - [phone]  - [phone] FX

## 2022-02-10 ENCOUNTER — OFFICE VISIT (OUTPATIENT)
Dept: FAMILY MEDICINE CLINIC | Facility: CLINIC | Age: 62
End: 2022-02-10

## 2022-02-10 VITALS
SYSTOLIC BLOOD PRESSURE: 130 MMHG | WEIGHT: 197 LBS | HEIGHT: 67 IN | RESPIRATION RATE: 16 BRPM | DIASTOLIC BLOOD PRESSURE: 80 MMHG | BODY MASS INDEX: 30.92 KG/M2

## 2022-02-10 DIAGNOSIS — J32.0 MAXILLARY SINUSITIS, UNSPECIFIED CHRONICITY: ICD-10-CM

## 2022-02-10 DIAGNOSIS — Z12.11 ENCOUNTER FOR SCREENING COLONOSCOPY: Primary | ICD-10-CM

## 2022-02-10 PROCEDURE — 99213 OFFICE O/P EST LOW 20 MIN: CPT | Performed by: INTERNAL MEDICINE

## 2022-02-10 RX ORDER — AZITHROMYCIN 250 MG/1
TABLET, FILM COATED ORAL
Qty: 6 TABLET | Refills: 0 | Status: SHIPPED | OUTPATIENT
Start: 2022-02-10 | End: 2022-04-18

## 2022-02-10 RX ORDER — FEXOFENADINE HCL AND PSEUDOEPHEDRINE HCI 180; 240 MG/1; MG/1
TABLET, EXTENDED RELEASE ORAL
Qty: 7 TABLET | Refills: 0 | Status: SHIPPED | OUTPATIENT
Start: 2022-02-10 | End: 2022-04-18

## 2022-02-10 NOTE — PROGRESS NOTES
02/10/2022    CC: Otitis Media (worse in left ear.  Started 5 days ago)  .        HPI  Sinusitis  This is a chronic problem. The current episode started in the past 7 days. The problem has been gradually worsening since onset. There has been no fever. The pain is mild. Associated symptoms include congestion, ear pain and sinus pressure. Pertinent negatives include no chills or coughing.        Ronak Jacobs is a 61 y.o. female.      The following portions of the patient's history were reviewed and updated as appropriate: allergies, current medications, past family history, past medical history, past social history, past surgical history and problem list.    Problem List  Patient Active Problem List   Diagnosis   • Benign essential hypertension   • Fatigue   • Left carotid bruit   • Palpitations   • Hyperlipidemia   • Palpitation   • Precordial pain   • Thyroid nodule   • Heart murmur       Past Medical History  Past Medical History:   Diagnosis Date   • GERD (gastroesophageal reflux disease)    • Glaucoma    • Heart murmur    • Hyperlipidemia    • Hypertension        Surgical History  Past Surgical History:   Procedure Laterality Date   • CARDIAC CATHETERIZATION N/A 3/15/2019    Procedure: Left Heart Cath;  Surgeon: Graciela Boland MD;  Location: [facility] INVASIVE LOCATION;  Service: Cardiology   • CARDIAC CATHETERIZATION N/A 3/15/2019    Procedure: Coronary angiography;  Surgeon: Graciela Boland MD;  Location: [facility] INVASIVE LOCATION;  Service: Cardiology   • HYSTERECTOMY     • THYROIDECTOMY, PARTIAL         Family History  Family History   Problem Relation Age of Onset   • Diabetes Mother    • Heart disease Mother    • Hypertension Mother    • Diabetes Father    • Hypertension Father    • Heart failure Father    • Heart disease Father    • Heart attack Neg Hx        Social History  Social History    Tobacco Use      Smoking status: Never Smoker      Smokeless tobacco: Never  Used       Is the Patient a current tobacco user? No    Allergies  Allergies   Allergen Reactions   • Atorvastatin Myalgia   • Niacin Unknown - Low Severity     CANNOT REMEMBER   • Rosuvastatin Myalgia   • Statins Myalgia       Current Medications    Current Outpatient Medications:   •  Alirocumab (Praluent) 150 MG/ML solution auto-injector, Inject  under the skin into the appropriate area as directed Every 14 (Fourteen) Days., Disp: , Rfl:   •  amLODIPine (NORVASC) 5 MG tablet, , Disp: , Rfl:   •  aspirin  MG tablet, Take 325 mg by mouth Daily., Disp: , Rfl:   •  escitalopram (LEXAPRO) 10 MG tablet, Take 1 tablet by mouth Daily., Disp: 90 tablet, Rfl: 3  •  famotidine (PEPCID) 20 MG tablet, Take 20 mg by mouth 2 (Two) Times a Day As Needed., Disp: , Rfl:   •  fluticasone (FLONASE) 50 MCG/ACT nasal spray, 2 sprays into the nostril(s) as directed by provider Daily., Disp: , Rfl:   •  timolol (TIMOPTIC) 0.5 % ophthalmic solution, Administer 1 drop to both eyes 2 (Two) Times a Day., Disp: , Rfl:   •  VITAMIN D PO, Take  by mouth., Disp: , Rfl:      Review of System  Review of Systems   Constitutional: Negative.  Negative for chills.   HENT: Positive for congestion, ear pain and sinus pressure.    Eyes: Negative.    Respiratory: Negative.  Negative for cough.    Endocrine: Negative.      I have reviewed and confirmed the accuracy of the ROS as documented by the MA/LPN/RN Edward Hawk MD    Vitals:    02/10/22 0746   BP: 130/80   Resp: 16     Body mass index is 30.85 kg/m².    Objective     Physical Exam  Physical Exam  HENT:      Head: Normocephalic.      Comments: Tympanic membranes were bulging bilaterally.     Nose: Congestion present.   Eyes:      Extraocular Movements: Extraocular movements intact.      Pupils: Pupils are equal, round, and reactive to light.   Cardiovascular:      Rate and Rhythm: Regular rhythm.      Heart sounds: Normal heart sounds.   Pulmonary:      Breath sounds: Normal breath  sounds.   Musculoskeletal:      Cervical back: Neck supple.   Neurological:      Mental Status: She is alert.         Assessment/Plan        This 61-year-old patient presents today with complaint of pain in the sinus area times several days.  He said previous episodes of sinusitis.  She relates that this time however she has noticed some hearing difficulties it sounds that people are distant and she has a popping sensation when she yawns or turns her head a certain way.  She has had no chills or fever.  Significantly, she had a COVID-19 home test done yesterday which was negative.      I feel that she has sinusitis with congestion.      Diagnoses and all orders for this visit:    1. Encounter for screening colonoscopy (Primary)  -     Ambulatory Referral For Screening Colonoscopy    2. Maxillary sinusitis, unspecified chronicity         Plan:  1.)  Allegra-D 1 tab p.o. daily dispense #7  2.)  Zithromax 250 mg 2 tablets now then 1 tab p.o. daily.  Dispense #6.  Plan #3.)  Follow-up in 4 to 5 months as needed.  4.)  Patient was originally scheduled for colonoscopy back in 2020 but she never get this scheduled.  We will reschedule her.          Edward Hawk MD  02/10/2022

## 2022-02-11 ENCOUNTER — TELEPHONE (OUTPATIENT)
Dept: FAMILY MEDICINE CLINIC | Facility: CLINIC | Age: 62
End: 2022-02-11

## 2022-02-11 NOTE — TELEPHONE ENCOUNTER
Pt called in regards to a prescription of Claritin D needed from appointment. She said that she received Allegra D instead of the needed Claritin. The pharmacy was contacted, and the pharmacist stated Allegra D was the ordered prescription. If the pt or pharmacy could be contacted in regards to this. Pt said the medication is needed for an ear infection.

## 2022-02-21 ENCOUNTER — OFFICE VISIT (OUTPATIENT)
Dept: CARDIOLOGY | Facility: CLINIC | Age: 62
End: 2022-02-21

## 2022-02-21 VITALS
SYSTOLIC BLOOD PRESSURE: 158 MMHG | HEIGHT: 67 IN | DIASTOLIC BLOOD PRESSURE: 90 MMHG | HEART RATE: 64 BPM | WEIGHT: 198 LBS | BODY MASS INDEX: 31.08 KG/M2

## 2022-02-21 DIAGNOSIS — R07.2 PRECORDIAL PAIN: ICD-10-CM

## 2022-02-21 DIAGNOSIS — R00.2 PALPITATION: Primary | ICD-10-CM

## 2022-02-21 DIAGNOSIS — I10 PRIMARY HYPERTENSION: ICD-10-CM

## 2022-02-21 DIAGNOSIS — E78.00 PURE HYPERCHOLESTEROLEMIA: ICD-10-CM

## 2022-02-21 PROCEDURE — 99214 OFFICE O/P EST MOD 30 MIN: CPT | Performed by: INTERNAL MEDICINE

## 2022-02-21 NOTE — PROGRESS NOTES
"6 MONTH FOLLOW UP    Subjective:        Bruna Jacobs is a 61 y.o. female who here for follow up    CC  Follow-up hyperlipidemia palpitation hypertension  HPI  61-year-old female with palpitation, precordial pain hyperlipidemia here for the follow-up with no complaints of chest pains tightness heaviness or the pressure sensation     Problems Addressed this Visit        Cardiac and Vasculature    Hyperlipidemia    Palpitation - Primary    Precordial pain      Other Visit Diagnoses     Primary hypertension          Diagnoses       Codes Comments    Palpitation    -  Primary ICD-10-CM: R00.2  ICD-9-CM: 785.1     Precordial pain     ICD-10-CM: R07.2  ICD-9-CM: 786.51     Pure hypercholesterolemia     ICD-10-CM: E78.00  ICD-9-CM: 272.0     Primary hypertension     ICD-10-CM: I10  ICD-9-CM: 401.9         .    The following portions of the patient's history were reviewed and updated as appropriate: allergies, current medications, past family history, past medical history, past social history, past surgical history and problem list.    Past Medical History:   Diagnosis Date   • GERD (gastroesophageal reflux disease)    • Glaucoma    • Heart murmur    • Hyperlipidemia    • Hypertension      reports that she has never smoked. She has never used smokeless tobacco. She reports current alcohol use. She reports that she does not use drugs.   Family History   Problem Relation Age of Onset   • Diabetes Mother    • Heart disease Mother    • Hypertension Mother    • Diabetes Father    • Hypertension Father    • Heart failure Father    • Heart disease Father    • Heart attack Neg Hx        Review of Systems  Constitutional: No wt loss, fever, fatigue  Gastrointestinal: No nausea, abdominal pain  Behavioral/Psych: No insomnia or anxiety   Cardiovascular no chest pains or tightness in the chest  Objective:       Physical Exam  /90   Pulse 64   Ht 170.2 cm (67\")   Wt 89.8 kg (198 lb)   LMP  (LMP Unknown)   BMI 31.01 kg/m² "   General appearance: No acute changes   Neck: Trachea midline; NECK, supple, no thyromegaly or lymphadenopathy   Lungs: Normal size and shape, normal breath sounds, equal distribution of air, no rales and rhonchi   CV: S1-S2 regular, no murmurs, no rub, no gallop   Abdomen: Soft, nontender; no masses , no abnormal abdominal sounds   Extremities: No deformity , normal color , no peripheral edema   Skin: Normal temperature, turgor and texture; no rash, ulcers          Procedures      Echocardiogram:        Current Outpatient Medications:   •  Alirocumab (Praluent) 150 MG/ML solution auto-injector, Inject  under the skin into the appropriate area as directed Every 14 (Fourteen) Days., Disp: , Rfl:   •  amLODIPine (NORVASC) 5 MG tablet, , Disp: , Rfl:   •  aspirin  MG tablet, Take 325 mg by mouth Daily., Disp: , Rfl:   •  azithromycin (Zithromax Z-Reynaldo) 250 MG tablet, Take 2 tablets by mouth on day 1, then 1 tablet daily on days 2-5, Disp: 6 tablet, Rfl: 0  •  escitalopram (LEXAPRO) 10 MG tablet, Take 1 tablet by mouth Daily., Disp: 90 tablet, Rfl: 3  •  famotidine (PEPCID) 20 MG tablet, Take 20 mg by mouth 2 (Two) Times a Day As Needed., Disp: , Rfl:   •  fexofenadine-pseudoephedrine (Allegra-D Allergy & Congestion) 180-240 MG per 24 hr tablet, 1 daily, Disp: 7 tablet, Rfl: 0  •  fluticasone (FLONASE) 50 MCG/ACT nasal spray, 2 sprays into the nostril(s) as directed by provider Daily., Disp: , Rfl:   •  timolol (TIMOPTIC) 0.5 % ophthalmic solution, Administer 1 drop to both eyes 2 (Two) Times a Day., Disp: , Rfl:   •  VITAMIN D PO, Take  by mouth., Disp: , Rfl:    Assessment:        Patient Active Problem List   Diagnosis   • Benign essential hypertension   • Fatigue   • Left carotid bruit   • Palpitations   • Hyperlipidemia   • Palpitation   • Precordial pain   • Thyroid nodule   • Heart murmur               Plan:            ICD-10-CM ICD-9-CM   1. Palpitation  R00.2 785.1   2. Precordial pain  R07.2 786.51   3.  Pure hypercholesterolemia  E78.00 272.0   4. Primary hypertension  I10 401.9     1. Palpitation  Palpitations under control    2. Precordial pain  Atypical    3. Pure hypercholesterolemia  Continue current treatment    4. Primary hypertension  Continue current treatment     BP BETTER AT HOME   PT WILL KEEP AN EYE ON IT    1 YR  COUNSELING:    Bruna Price was given to patient for the following topics: diagnostic results, risk factor reductions, impressions, risks and benefits of treatment options and importance of treatment compliance .       SMOKING COUNSELING:    [unfilled]    Dictated using Dragon dictation

## 2022-03-24 RX ORDER — OLMESARTAN MEDOXOMIL 40 MG/1
TABLET ORAL
Qty: 90 TABLET | OUTPATIENT
Start: 2022-03-24

## 2022-04-01 RX ORDER — OLMESARTAN MEDOXOMIL 40 MG/1
TABLET ORAL
Qty: 90 TABLET | Refills: 4 | Status: SHIPPED | OUTPATIENT
Start: 2022-04-01

## 2022-04-01 NOTE — TELEPHONE ENCOUNTER
Rx Refill Note  Requested Prescriptions     Pending Prescriptions Disp Refills   • olmesartan (BENICAR) 40 MG tablet [Pharmacy Med Name: OLMESARTAN MEDOXOMIL 40 MG TAB] 90 tablet      Sig: TAKE ONE TABLET BY MOUTH DAILY      Last office visit with prescribing clinician: 2/10/2022      Next office visit with prescribing clinician: 7/11/2022            Javan Carmichael MA  04/01/22, 11:06 EDT

## 2022-04-01 NOTE — TELEPHONE ENCOUNTER
Caller: Bruna Jacobs     Relationship to Patient: SELF    Phone Number: [phone]     Reason for Call: PATIENT CALLED TO CHECK ON THE STATUS OF THIS REFILL. SHE HAS BEEN TRYING TO GET IT REFILLED SINCE LAST WEEK BUT HAS NOT HEARD BACK YET. SHE WAS INFORMED BY THE PHARMACY THAT THEY HAVEN'T RECEIVED ANYTHING FROM DR. DE LA VEGA. SHE CURRENTLY HAS 3 DAYS LEFT.

## 2022-04-12 ENCOUNTER — TELEPHONE (OUTPATIENT)
Dept: FAMILY MEDICINE CLINIC | Facility: CLINIC | Age: 62
End: 2022-04-12

## 2022-04-12 NOTE — TELEPHONE ENCOUNTER
Caller: Bruna Jacobs    Relationship: Self    Best call back number: [phone] (H)    What is the best time to reach you: ANYTIME    Who are you requesting to speak with (clinical staff, provider,  specific staff member): CLINICAL STAFF    Do you know the name of the person who called:      What was the call regarding: PATIENT CALLED TO ADVISE THAT SHE WAS LOOKING OVER HER VISIT SUMMURY AND IT STATES THAT SHE SHOULD STOP TAKING olmesartan (BENICAR) 40 MG tablet ,  BUT PATIENT STATES THAT SHE WAS NEVER ADVISED TO STOP TAKING THIS MEDICATION. PATIENT IS REQUESTING A CALLBACK TO DISCUSS THIS MATTER.    Do you require a callback:  YES        THANKS

## 2022-04-18 ENCOUNTER — OFFICE VISIT (OUTPATIENT)
Dept: FAMILY MEDICINE CLINIC | Facility: CLINIC | Age: 62
End: 2022-04-18

## 2022-04-18 VITALS
HEART RATE: 65 BPM | OXYGEN SATURATION: 98 % | SYSTOLIC BLOOD PRESSURE: 122 MMHG | BODY MASS INDEX: 31 KG/M2 | HEIGHT: 67 IN | DIASTOLIC BLOOD PRESSURE: 80 MMHG

## 2022-04-18 DIAGNOSIS — J32.9 RECURRENT SINUSITIS: ICD-10-CM

## 2022-04-18 DIAGNOSIS — R05.9 COUGH: Primary | ICD-10-CM

## 2022-04-18 DIAGNOSIS — Z12.31 ENCOUNTER FOR SCREENING MAMMOGRAM FOR MALIGNANT NEOPLASM OF BREAST: ICD-10-CM

## 2022-04-18 DIAGNOSIS — R51.9 FACIAL PAIN: ICD-10-CM

## 2022-04-18 DIAGNOSIS — J01.00 SUBACUTE MAXILLARY SINUSITIS: ICD-10-CM

## 2022-04-18 LAB
EXPIRATION DATE: NORMAL
FLUAV AG UPPER RESP QL IA.RAPID: NOT DETECTED
FLUBV AG UPPER RESP QL IA.RAPID: NOT DETECTED
INTERNAL CONTROL: NORMAL
Lab: NORMAL
SARS-COV-2 AG UPPER RESP QL IA.RAPID: NOT DETECTED

## 2022-04-18 PROCEDURE — 87428 SARSCOV & INF VIR A&B AG IA: CPT | Performed by: INTERNAL MEDICINE

## 2022-04-18 PROCEDURE — 99214 OFFICE O/P EST MOD 30 MIN: CPT | Performed by: INTERNAL MEDICINE

## 2022-04-18 RX ORDER — CEFDINIR 300 MG/1
300 CAPSULE ORAL 2 TIMES DAILY
Qty: 14 CAPSULE | Refills: 0 | Status: SHIPPED | OUTPATIENT
Start: 2022-04-18 | End: 2022-10-12

## 2022-04-18 NOTE — PROGRESS NOTES
Subjective Chief complaint is possible sinusitis and ear pain  Bruna Jacobs is a 61 y.o. female.     History of Present Illness Bruna is here today for some facial pressure.  This is been going on for few weeks despite taking Claritin and Flonase.  She initially had some fever but that seems to have gone away.  She is experiencing some discomfort in her ears.  She also has a minor cough.  Her Covid and flu tests were negative here.  She is on a immunosuppressant medicine in the form of alirocumab.  She does report that previously she usually only had trouble in the fall but now her problems seem to be more year-round in terms of sinuses.  Her blood pressure here today is controlled.  She is not allergic to penicillin or cephalosporins.    The following portions of the patient's history were reviewed and updated as appropriate: allergies, current medications, past family history, past medical history, past social history, past surgical history and problem list.    Review of Systems   Constitutional: Negative for chills and fever.   HENT: Positive for congestion, ear pain and sinus pressure.    Respiratory: Positive for cough.        Objective   Physical Exam  Vitals and nursing note reviewed.   Constitutional:       Appearance: Normal appearance.   HENT:      Right Ear: Tympanic membrane and ear canal normal.      Left Ear: Tympanic membrane and ear canal normal.      Nose:      Comments: She has significant bilateral nasal congestion but not much in the way of erythema.  She has some bilateral maxillary and frontal sinus tenderness.  Cardiovascular:      Rate and Rhythm: Normal rate and regular rhythm.   Pulmonary:      Effort: Pulmonary effort is normal.      Breath sounds: No wheezing, rhonchi or rales.   Neurological:      Mental Status: She is alert.           Assessment/Plan   Diagnoses and all orders for this visit:    1. Cough (Primary)  -     POCT SARS-CoV-2 Antigen TIM    2. Subacute maxillary  sinusitis  -     CT Sinus Without Contrast; Future    3. Recurrent sinusitis  -     CT Sinus Without Contrast; Future    4. Facial pain  -     CT Sinus Without Contrast; Future    5. Encounter for screening mammogram for malignant neoplasm of breast  -     Mammo Screening Bilateral With CAD; Future    Other orders  -     cefdinir (OMNICEF) 300 MG capsule; Take 1 capsule by mouth 2 (Two) Times a Day.  Dispense: 14 capsule; Refill: 0      Bruna is here today for respiratory symptoms.  Her symptoms seem out of proportion to the physical findings.  I am going to go ahead and treat her with some Omnicef for the maxillary tenderness.  However I am going to get a CT of the sinuses and make sure nothing else is going on.  She has had a screening mammogram that I can find since 2019.  I am going to go ahead and order 1 of those.

## 2022-05-27 ENCOUNTER — HOSPITAL ENCOUNTER (OUTPATIENT)
Dept: CT IMAGING | Facility: HOSPITAL | Age: 62
Discharge: HOME | End: 2022-05-27
Admitting: INTERNAL MEDICINE

## 2022-05-27 ENCOUNTER — TELEPHONE (OUTPATIENT)
Dept: FAMILY MEDICINE CLINIC | Facility: CLINIC | Age: 62
End: 2022-05-27

## 2022-05-27 DIAGNOSIS — J01.00 SUBACUTE MAXILLARY SINUSITIS: ICD-10-CM

## 2022-05-27 DIAGNOSIS — J32.9 RECURRENT SINUSITIS: ICD-10-CM

## 2022-05-27 DIAGNOSIS — R51.9 FACIAL PAIN: ICD-10-CM

## 2022-05-27 PROCEDURE — 70486 CT MAXILLOFACIAL W/O DYE: CPT

## 2022-05-27 NOTE — TELEPHONE ENCOUNTER
Caller: Yusef Jacobs    Relationship:      Best call back number: [phone] CELL: [phone]    What is the best time to reach you: ANYTIME    Who are you requesting to speak with (clinical staff, provider,  specific staff member): N/A    Do you know the name of the person who called: NO    What was the call regarding: UNSURE    Do you require a callback: YES    PATIENT RECEIVED A CALL BUT NO VM UNSURE WHAT THIS IS IN REGARDS TO.

## 2022-06-24 ENCOUNTER — OFFICE VISIT (OUTPATIENT)
Dept: FAMILY MEDICINE CLINIC | Facility: CLINIC | Age: 62
End: 2022-06-24

## 2022-06-24 VITALS
WEIGHT: 202.6 LBS | TEMPERATURE: 96.6 F | DIASTOLIC BLOOD PRESSURE: 72 MMHG | HEIGHT: 67 IN | SYSTOLIC BLOOD PRESSURE: 102 MMHG | HEART RATE: 66 BPM | OXYGEN SATURATION: 99 % | BODY MASS INDEX: 31.8 KG/M2 | RESPIRATION RATE: 20 BRPM

## 2022-06-24 DIAGNOSIS — L24.7 CONTACT DERMATITIS AND ECZEMA DUE TO PLANT: Primary | ICD-10-CM

## 2022-06-24 PROCEDURE — 99212 OFFICE O/P EST SF 10 MIN: CPT | Performed by: FAMILY MEDICINE

## 2022-06-24 NOTE — PROGRESS NOTES
HPI  Bruna Jacobs is a 62 y.o. female who is here for itchy rash especially on the left hand and arm.  This started after working outside.  Reports having severe poison ivy reactions in the past.      Review of Systems   Skin: Positive for rash.   All other systems reviewed and are negative.        Past Medical History:   Diagnosis Date   • GERD (gastroesophageal reflux disease)    • Glaucoma    • Heart murmur    • Hyperlipidemia    • Hypertension        Past Surgical History:   Procedure Laterality Date   • CARDIAC CATHETERIZATION N/A 3/15/2019    Procedure: Left Heart Cath;  Surgeon: Graciela Boland MD;  Location:  [facility] INVAS[facility];  Service: Cardiology   • CARDIAC CATHETERIZATION N/A 3/15/2019    Procedure: Coronary angiography;  Surgeon: Graciela Boland MD;  Location:  [facility]ATH [facility]VAS[facility];  Service: Cardiology   • HYSTERECTOMY     • THYROIDECTOMY, PARTIAL         Family History   Problem Relation Age of Onset   • Diabetes Mother    • Heart disease Mother    • Hypertension Mother    • Diabetes Father    • Hypertension Father    • Heart failure Father    • Heart disease Father    • Heart attack Neg Hx        Social History     Socioeconomic History   • Marital status:    Tobacco Use   • Smoking status: Never Smoker   • Smokeless tobacco: Never Used   Vaping Use   • Vaping Use: Never used   Substance and Sexual Activity   • Alcohol use: Yes     Comment: RARELY   • Drug use: No   • Sexual activity: Defer     Birth control/protection: Surgical       Vitals:    06/24/22 1137   BP: 102/72   Pulse: 66   Resp: 20   Temp: 96.6 °F (35.9 °C)   SpO2: 99%        Body mass index is 31.72 kg/m².      Physical Exam  Vitals and nursing note reviewed.   Constitutional:       General: She is not in acute distress.     Appearance: She is well-developed.   HENT:      Head: Normocephalic and atraumatic.      Nose:      Comments: Patient with mask.  Provider with mask and shield  Eyes:       Conjunctiva/sclera: Conjunctivae normal.      Pupils: Pupils are equal, round, and reactive to light.   Neck:      Thyroid: No thyromegaly.   Cardiovascular:      Rate and Rhythm: Normal rate and regular rhythm.      Heart sounds: Normal heart sounds.   Pulmonary:      Effort: Pulmonary effort is normal. No respiratory distress.      Breath sounds: Normal breath sounds.   Abdominal:      General: There is no distension.      Palpations: Abdomen is soft. There is no mass.      Tenderness: There is no abdominal tenderness.      Hernia: No hernia is present.   Musculoskeletal:         General: No tenderness or deformity. Normal range of motion.      Cervical back: Normal range of motion.   Lymphadenopathy:      Cervical: No cervical adenopathy.   Skin:     General: Skin is warm and dry.      Coloration: Skin is not pale.      Findings: Rash present. Rash is vesicular.          Neurological:      General: No focal deficit present.      Mental Status: She is alert and oriented to person, place, and time.      Motor: No abnormal muscle tone.      Coordination: Coordination normal.   Psychiatric:         Behavior: Behavior normal.         Thought Content: Thought content normal.         Judgment: Judgment normal.           Assessment/Plan    Diagnoses and all orders for this visit:    1. Contact dermatitis and eczema due to plant (Primary)  -     triamcinolone (KENALOG) 0.1 % ointment; Apply 1 application topically to the appropriate area as directed 2 (Two) Times a Day.  Dispense: 30 g; Refill: 1      Patient presents with probable poison ivy contact dermatitis as discussed above.  Discussed treatment options and since fairly localized hopefully can be controlled with topical therapy as above.  If spreads or worsens contact office but discussed avoiding prednisone therapy etc. if possible.

## 2022-06-27 ENCOUNTER — TELEPHONE (OUTPATIENT)
Dept: FAMILY MEDICINE CLINIC | Facility: CLINIC | Age: 62
End: 2022-06-27

## 2022-06-27 RX ORDER — METHYLPREDNISOLONE 4 MG/1
TABLET ORAL
Qty: 21 TABLET | Refills: 0 | Status: SHIPPED | OUTPATIENT
Start: 2022-06-27 | End: 2022-07-11

## 2022-06-27 NOTE — TELEPHONE ENCOUNTER
Caller: Bruna Jacobs    Relationship: Self    Best call back number: [phone]      What is the best time to reach you: ANYTIME    Who are you requesting to speak with (clinical staff, provider,  specific staff member): MA         What was the call regarding: PATIENT IS CALLING IN SHE WAS IN OFFICE ON Friday FOR POISON IVY AND WAS PRESCRIBED SOME MEDICATION BUT IT IS NOT WORKING, IT IS SPREADING NOW TO HER FACE,EARS, DOWN HER BACK. SHE WAS ADVISED TO CALLBACK IF MEDICATION WAS NOT WORKING.     Do you require a callback: YES

## 2022-06-28 RX ORDER — AMLODIPINE BESYLATE 5 MG/1
TABLET ORAL
Qty: 90 TABLET | Refills: 1 | Status: SHIPPED | OUTPATIENT
Start: 2022-06-28 | End: 2023-01-03

## 2022-07-11 ENCOUNTER — OFFICE VISIT (OUTPATIENT)
Dept: FAMILY MEDICINE CLINIC | Facility: CLINIC | Age: 62
End: 2022-07-11

## 2022-07-11 VITALS
HEIGHT: 67 IN | SYSTOLIC BLOOD PRESSURE: 120 MMHG | WEIGHT: 202 LBS | DIASTOLIC BLOOD PRESSURE: 80 MMHG | RESPIRATION RATE: 16 BRPM | BODY MASS INDEX: 31.71 KG/M2

## 2022-07-11 DIAGNOSIS — J32.9 RECURRENT SINUSITIS: Primary | Chronic | ICD-10-CM

## 2022-07-11 DIAGNOSIS — I10 PRIMARY HYPERTENSION: Chronic | ICD-10-CM

## 2022-07-11 DIAGNOSIS — E78.5 HYPERLIPIDEMIA, UNSPECIFIED HYPERLIPIDEMIA TYPE: ICD-10-CM

## 2022-07-11 PROCEDURE — 99214 OFFICE O/P EST MOD 30 MIN: CPT | Performed by: INTERNAL MEDICINE

## 2022-07-11 NOTE — PROGRESS NOTES
07/11/2022    CC: Hypertension (F/U. No other issues)  .        HPI  This 62-year-old presents at this time for follow-up of hypertension hyperlipidemia.  She relates she is taking her medication as prescribed.  She has had no chest pain or shortness of breath in the interim of visits.  She was scheduled for colonoscopy back in February but she did not follow through on that.  She has missed several appointments for colonoscopies.       Subjective   Bruna Jacobs is a 62 y.o. female.      The following portions of the patient's history were reviewed and updated as appropriate: allergies, current medications, past family history, past medical history, past social history, past surgical history and problem list.    Problem List  Patient Active Problem List   Diagnosis   • Benign essential hypertension   • Fatigue   • Left carotid bruit   • Palpitations   • Hyperlipidemia   • Palpitation   • Precordial pain   • Thyroid nodule   • Heart murmur       Past Medical History  Past Medical History:   Diagnosis Date   • GERD (gastroesophageal reflux disease)    • Glaucoma    • Heart murmur    • Hyperlipidemia    • Hypertension        Surgical History  Past Surgical History:   Procedure Laterality Date   • CARDIAC CATHETERIZATION N/A 3/15/2019    Procedure: Left Heart Cath;  Surgeon: Graciela Boland MD;  Location: [facility] CATH INVASIVE LOCATION;  Service: Cardiology   • CARDIAC CATHETERIZATION N/A 3/15/2019    Procedure: Coronary angiography;  Surgeon: Graciela Boland MD;  Location:  [facility] CATH INVASIVE LOCATION;  Service: Cardiology   • HYSTERECTOMY     • THYROIDECTOMY, PARTIAL         Family History  Family History   Problem Relation Age of Onset   • Diabetes Mother    • Heart disease Mother    • Hypertension Mother    • Diabetes Father    • Hypertension Father    • Heart failure Father    • Heart disease Father    • Heart attack Neg Hx        Social History  Social History    Tobacco Use      Smoking status:  Never Smoker      Smokeless tobacco: Never Used       Is the Patient a current tobacco user? No    Allergies  Allergies   Allergen Reactions   • Atorvastatin Myalgia   • Niacin Unknown - Low Severity     CANNOT REMEMBER   • Rosuvastatin Myalgia   • Statins Myalgia       Current Medications    Current Outpatient Medications:   •  Alirocumab (PRALUENT) 150 MG/ML injection pen, Inject  under the skin into the appropriate area as directed Every 14 (Fourteen) Days., Disp: , Rfl:   •  amLODIPine (NORVASC) 5 MG tablet, TAKE ONE TABLET BY MOUTH DAILY, Disp: 90 tablet, Rfl: 1  •  aspirin  MG tablet, Take 325 mg by mouth Daily., Disp: , Rfl:   •  cefdinir (OMNICEF) 300 MG capsule, Take 1 capsule by mouth 2 (Two) Times a Day., Disp: 14 capsule, Rfl: 0  •  escitalopram (LEXAPRO) 10 MG tablet, Take 1 tablet by mouth Daily., Disp: 90 tablet, Rfl: 3  •  famotidine (PEPCID) 20 MG tablet, Take 20 mg by mouth 2 (Two) Times a Day As Needed., Disp: , Rfl:   •  fluticasone (FLONASE) 50 MCG/ACT nasal spray, 2 sprays into the nostril(s) as directed by provider Daily., Disp: , Rfl:   •  olmesartan (BENICAR) 40 MG tablet, TAKE ONE TABLET BY MOUTH DAILY, Disp: 90 tablet, Rfl: 4  •  timolol (TIMOPTIC) 0.5 % ophthalmic solution, Administer 1 drop to both eyes 2 (Two) Times a Day., Disp: , Rfl:   •  triamcinolone (KENALOG) 0.1 % ointment, Apply 1 application topically to the appropriate area as directed 2 (Two) Times a Day., Disp: 30 g, Rfl: 1  •  VITAMIN D PO, Take  by mouth., Disp: , Rfl:      Review of System  Review of Systems   Constitutional: Negative.    Eyes: Negative.    Respiratory: Negative.    Cardiovascular: Negative.      I have reviewed and confirmed the accuracy of the ROS as documented by the MA/LPN/RN Edward Hawk MD    Vitals:    07/11/22 0755   BP: 120/80   Resp: 16     Body mass index is 31.64 kg/m².    Objective     Physical Exam  Physical Exam  Cardiovascular:      Rate and Rhythm: Regular rhythm.      Heart  sounds: Normal heart sounds.   Pulmonary:      Breath sounds: Normal breath sounds.   Musculoskeletal:      Cervical back: Normal range of motion.   Neurological:      Mental Status: She is alert.         Assessment & Plan     This 62-year-old patient with a prior history of hyperlipidemia presents today for follow-up.  Her blood pressure is well controlled today at 120/80 in the left arm sitting position standard cuff.  She relates that she forgot to schedule her colonoscopy in February.  We have given her the phone number and asked her to call Dr. Hameed to get this rescheduled.  She relates that her sinus problems have not given her any problems in the intervening months.    Review of her labs shows that as of 8/6/2021 her total cholesterol was 185.  Note is made that her HDL was excellent at 64 with an LDL that was 98.  Diagnoses and all orders for this visit:    1. Recurrent sinusitis (Primary)  Comments:  Resolved    2. Hyperlipidemia, unspecified hyperlipidemia type  Comments:  Well-controlled as of 8/2021    3. Primary hypertension  Comments:  Well-controlled, at goal.  Patient currently on amlodipine 5 mg         Plan:  1.)  Follow-up in November 2022 for physical examination.  2.)  Lipid profile today.  3.)  The patient was urged to make an appointment with Dr. Hameed regarding her colonoscopy.    Edward Hawk MD  07/11/2022

## 2022-07-12 LAB
CHOLEST SERPL-MCNC: 183 MG/DL (ref 100–199)
HDLC SERPL-MCNC: 59 MG/DL
LDLC SERPL CALC-MCNC: 99 MG/DL (ref 0–99)
TRIGL SERPL-MCNC: 141 MG/DL (ref 0–149)
VLDLC SERPL CALC-MCNC: 25 MG/DL (ref 5–40)

## 2022-08-12 ENCOUNTER — OFFICE VISIT (OUTPATIENT)
Dept: FAMILY MEDICINE CLINIC | Facility: CLINIC | Age: 62
End: 2022-08-12

## 2022-08-12 VITALS
SYSTOLIC BLOOD PRESSURE: 126 MMHG | HEART RATE: 73 BPM | DIASTOLIC BLOOD PRESSURE: 86 MMHG | WEIGHT: 207 LBS | OXYGEN SATURATION: 98 % | HEIGHT: 67 IN | BODY MASS INDEX: 32.49 KG/M2

## 2022-08-12 DIAGNOSIS — H83.09 LABYRINTHITIS, UNSPECIFIED LATERALITY: Primary | ICD-10-CM

## 2022-08-12 PROCEDURE — 99214 OFFICE O/P EST MOD 30 MIN: CPT | Performed by: INTERNAL MEDICINE

## 2022-08-12 RX ORDER — MECLIZINE HYDROCHLORIDE 25 MG/1
25 TABLET ORAL 3 TIMES DAILY PRN
Qty: 30 TABLET | Refills: 0 | Status: SHIPPED | OUTPATIENT
Start: 2022-08-12 | End: 2023-01-23

## 2022-08-12 RX ORDER — METHYLPREDNISOLONE 4 MG/1
TABLET ORAL
Qty: 4 TABLET | Refills: 0 | Status: SHIPPED | OUTPATIENT
Start: 2022-08-12 | End: 2022-10-12

## 2022-08-31 ENCOUNTER — PREP FOR SURGERY (OUTPATIENT)
Dept: OTHER | Facility: HOSPITAL | Age: 62
End: 2022-08-31

## 2022-08-31 DIAGNOSIS — Z86.010 HISTORY OF ADENOMATOUS POLYP OF COLON: Primary | ICD-10-CM

## 2022-08-31 PROBLEM — Z86.0101 HISTORY OF ADENOMATOUS POLYP OF COLON: Status: ACTIVE | Noted: 2022-08-31

## 2022-09-06 ENCOUNTER — TELEPHONE (OUTPATIENT)
Dept: FAMILY MEDICINE CLINIC | Facility: CLINIC | Age: 62
End: 2022-09-06

## 2022-09-06 NOTE — TELEPHONE ENCOUNTER
Caller: Bruna Jacobs    Relationship: Self    Best call back number: [phone]    What medication are you requesting: COUGH AND CONGESTION     What are your current symptoms: SINUS CONGESTION,COUGH,HEADACHE,SNEEZING,STUFFY NOSE SO SHE HAS TO SIT UP WHEN SHE SLEEPS TO BREATH    How long have you been experiencing symptoms: ABOUT A WEEK    Have you had these symptoms before:    [] Yes  [x] No    Have you been treated for these symptoms before:   [] Yes  [x] No    If a prescription is needed, what is your preferred pharmacy and phone number: [id number] - [place], [place] - [address] [place] [address] AT [address] [place] [address] - [phone]  - [phone]   [phone]     Additional notes:PATIENT REQUESTING SOME MEDICINE O HELP WITH HER SYMPTOMS AND IF AN APPOINTMENT IS NEEDED TO CALLBACK.

## 2022-09-07 RX ORDER — FEXOFENADINE HCL 180 MG/1
180 TABLET ORAL DAILY
Qty: 30 TABLET | Refills: 2 | Status: SHIPPED | OUTPATIENT
Start: 2022-09-07

## 2022-10-05 ENCOUNTER — FLU SHOT (OUTPATIENT)
Dept: FAMILY MEDICINE CLINIC | Facility: CLINIC | Age: 62
End: 2022-10-05

## 2022-10-05 DIAGNOSIS — Z23 NEED FOR INFLUENZA VACCINATION: Primary | ICD-10-CM

## 2022-10-05 PROCEDURE — 90471 IMMUNIZATION ADMIN: CPT | Performed by: INTERNAL MEDICINE

## 2022-10-05 PROCEDURE — 90686 IIV4 VACC NO PRSV 0.5 ML IM: CPT | Performed by: INTERNAL MEDICINE

## 2022-10-13 ENCOUNTER — HOSPITAL ENCOUNTER (OUTPATIENT)
Facility: HOSPITAL | Age: 62
Setting detail: HOSPITAL OUTPATIENT SURGERY
Discharge: HOME | End: 2022-10-13
Attending: SURGERY | Admitting: SURGERY

## 2022-10-13 ENCOUNTER — ANESTHESIA (OUTPATIENT)
Dept: GASTROENTEROLOGY | Facility: HOSPITAL | Age: 62
End: 2022-10-13

## 2022-10-13 ENCOUNTER — ANESTHESIA EVENT (OUTPATIENT)
Dept: GASTROENTEROLOGY | Facility: HOSPITAL | Age: 62
End: 2022-10-13

## 2022-10-13 VITALS
SYSTOLIC BLOOD PRESSURE: 121 MMHG | WEIGHT: 201 LBS | OXYGEN SATURATION: 95 % | RESPIRATION RATE: 16 BRPM | TEMPERATURE: 98.8 F | HEART RATE: 68 BPM | DIASTOLIC BLOOD PRESSURE: 89 MMHG | BODY MASS INDEX: 31.55 KG/M2 | HEIGHT: 67 IN

## 2022-10-13 PROCEDURE — 0 LIDOCAINE 1 % SOLUTION: Performed by: NURSE ANESTHETIST, CERTIFIED REGISTERED

## 2022-10-13 PROCEDURE — 45378 DIAGNOSTIC COLONOSCOPY: CPT | Performed by: SURGERY

## 2022-10-13 PROCEDURE — 25010000002 PROPOFOL 10 MG/ML EMULSION: Performed by: NURSE ANESTHETIST, CERTIFIED REGISTERED

## 2022-10-13 PROCEDURE — S0260 H&P FOR SURGERY: HCPCS | Performed by: SURGERY

## 2022-10-13 RX ORDER — PROPOFOL 10 MG/ML
VIAL (ML) INTRAVENOUS AS NEEDED
Status: DISCONTINUED | OUTPATIENT
Start: 2022-10-13 | End: 2022-10-13 | Stop reason: SURG

## 2022-10-13 RX ORDER — SODIUM CHLORIDE, SODIUM LACTATE, POTASSIUM CHLORIDE, CALCIUM CHLORIDE 600; 310; 30; 20 MG/100ML; MG/100ML; MG/100ML; MG/100ML
1000 INJECTION, SOLUTION INTRAVENOUS CONTINUOUS
Status: DISCONTINUED | OUTPATIENT
Start: 2022-10-13 | End: 2022-10-13 | Stop reason: HOSPADM

## 2022-10-13 RX ORDER — LIDOCAINE HYDROCHLORIDE 10 MG/ML
INJECTION, SOLUTION INFILTRATION; PERINEURAL AS NEEDED
Status: DISCONTINUED | OUTPATIENT
Start: 2022-10-13 | End: 2022-10-13 | Stop reason: SURG

## 2022-10-13 RX ADMIN — PROPOFOL 300 MCG/KG/MIN: 10 INJECTION, EMULSION INTRAVENOUS at 10:33

## 2022-10-13 RX ADMIN — SODIUM CHLORIDE, POTASSIUM CHLORIDE, SODIUM LACTATE AND CALCIUM CHLORIDE 1000 ML: 600; 310; 30; 20 INJECTION, SOLUTION INTRAVENOUS at 09:44

## 2022-10-13 RX ADMIN — PROPOFOL 100 MG: 10 INJECTION, EMULSION INTRAVENOUS at 10:33

## 2022-10-13 RX ADMIN — LIDOCAINE HYDROCHLORIDE 50 MG: 10 INJECTION, SOLUTION INFILTRATION; PERINEURAL at 10:33

## 2022-10-13 NOTE — ANESTHESIA PREPROCEDURE EVALUATION
Anesthesia Evaluation     Patient summary reviewed and Nursing notes reviewed   NPO Solid Status: > 8 hours  NPO Liquid Status: > 2 hours           Airway   Mallampati: II  TM distance: >3 FB  Neck ROM: full  Dental    (+) upper dentures and partials    Pulmonary - negative pulmonary ROS and normal exam    breath sounds clear to auscultation  Cardiovascular - normal exam    Rhythm: regular  Rate: normal    (+) hypertension, valvular problems/murmurs murmur, CHF Diastolic >=55%, hyperlipidemia,   (-) angina, orthopnea, PND, SANDERS      Neuro/Psych  (+) dizziness/light headedness, psychiatric history Depression,    GI/Hepatic/Renal/Endo    (+)  GERD,  thyroid problem thyroid nodules    Musculoskeletal (-) negative ROS    Abdominal    Substance History - negative use     OB/GYN negative ob/gyn ROS         Other - negative ROS         Other Comment: Glaucoma                Anesthesia Plan    ASA 2     MAC       Anesthetic plan, risks, benefits, and alternatives have been provided, discussed and informed consent has been obtained with: patient.        CODE STATUS:

## 2022-10-13 NOTE — H&P
Cc: Personal history of colon polyp    History of presenting illness: 62-year-old female presenting for colonoscopy.  Last scope done approximately 2017, patient believes polyps were identified at that time    Past medical history: Gastroesophageal reflux disease, hypertension, hyperlipidemia, glaucoma, sleep apnea    Past surgical history: Cardiac catheterization 2019, hysterectomy, partial thyroidectomy, colonoscopy around 2017    Medications: Reviewed and reconciled in epic, noted to include aspirin    Allergies: Statin drugs, niacin    Social history: She is a non-smoker    Family history: Negative for known colon or rectal cancer    Physical exam:  Body mass index: 31.5  General: Awake and alert, no distress  Head: Normocephalic, atraumatic  Neck: Supple, trachea midline  Eyes: Extraocular movements intact, no icterus  Respiratory: No use of accessory muscles, good bilateral chest expansion  Abdomen: Soft, benign, no hernia felt  Skin: Warm and dry    Assessment and plan:  -Personal history of colon polyps  -Plan for colonoscopy today, risks include bleeding and perforation, patient agreeable to proceed    Yves Berg MD  General and Endoscopic Surgery  [facility]    [address], [address]  [place], [place], [address]  P: [phone]  F: [phone]

## 2022-10-13 NOTE — ANESTHESIA POSTPROCEDURE EVALUATION
Patient: Bruna Jacobs    Procedure Summary     Date: 10/13/22 Room / Location: [facility] ENDOSCOPY 5 /  [facility]    Anesthesia Start: 1031 Anesthesia Stop: 1057    Procedure: COLONOSCOPY TO CECUM Diagnosis:       History of adenomatous polyp of colon      (History of adenomatous polyp of colon [Z86.010])    Surgeons: Yves Berg MD Provider: David Jose MD    Anesthesia Type: MAC ASA Status: 2          Anesthesia Type: MAC    Vitals  Vitals Value Taken Time   /89 10/13/22 1114   Temp     Pulse 68 10/13/22 1114   Resp 16 10/13/22 1114   SpO2 95 % 10/13/22 1114           Post Anesthesia Care and Evaluation    Patient location during evaluation: PACU  Patient participation: complete - patient participated  Level of consciousness: awake  Pain scale: See nurse's notes for pain score.  Pain management: adequate    Airway patency: patent  Anesthetic complications: No anesthetic complications  PONV Status: none  Cardiovascular status: acceptable  Respiratory status: acceptable  Hydration status: acceptable    Comments: Patient seen and examined postoperatively; vital signs stable; SpO2 greater than or equal to 90%; cardiopulmonary status stable; nausea/vomiting adequately controlled; pain adequately controlled; no apparent anesthesia complications; patient discharged from anesthesia care when discharge criteria were met

## 2022-10-14 ENCOUNTER — HOSPITAL ENCOUNTER (OUTPATIENT)
Dept: MAMMOGRAPHY | Facility: HOSPITAL | Age: 62
Discharge: HOME | End: 2022-10-14
Admitting: INTERNAL MEDICINE

## 2022-10-14 DIAGNOSIS — Z12.31 ENCOUNTER FOR SCREENING MAMMOGRAM FOR MALIGNANT NEOPLASM OF BREAST: ICD-10-CM

## 2022-10-14 PROCEDURE — 77063 BREAST TOMOSYNTHESIS BI: CPT

## 2022-10-14 PROCEDURE — 77067 SCR MAMMO BI INCL CAD: CPT

## 2023-01-03 RX ORDER — AMLODIPINE BESYLATE 5 MG/1
TABLET ORAL
Qty: 30 TABLET | Refills: 0 | Status: SHIPPED | OUTPATIENT
Start: 2023-01-03 | End: 2023-01-30

## 2023-01-03 RX ORDER — ESCITALOPRAM OXALATE 10 MG/1
TABLET ORAL
Qty: 90 TABLET | Refills: 3 | Status: SHIPPED | OUTPATIENT
Start: 2023-01-03

## 2023-01-23 ENCOUNTER — HOSPITAL ENCOUNTER (OUTPATIENT)
Dept: GENERAL RADIOLOGY | Facility: HOSPITAL | Age: 63
Discharge: HOME | End: 2023-01-23
Admitting: INTERNAL MEDICINE
Payer: COMMERCIAL

## 2023-01-23 ENCOUNTER — OFFICE VISIT (OUTPATIENT)
Dept: FAMILY MEDICINE CLINIC | Facility: CLINIC | Age: 63
End: 2023-01-23
Payer: COMMERCIAL

## 2023-01-23 VITALS
HEIGHT: 67 IN | BODY MASS INDEX: 32.68 KG/M2 | WEIGHT: 208.2 LBS | RESPIRATION RATE: 16 BRPM | DIASTOLIC BLOOD PRESSURE: 80 MMHG | SYSTOLIC BLOOD PRESSURE: 120 MMHG

## 2023-01-23 DIAGNOSIS — E03.8 OTHER SPECIFIED HYPOTHYROIDISM: ICD-10-CM

## 2023-01-23 DIAGNOSIS — H83.09 LABYRINTHITIS, UNSPECIFIED LATERALITY: ICD-10-CM

## 2023-01-23 DIAGNOSIS — I10 PRIMARY HYPERTENSION: ICD-10-CM

## 2023-01-23 DIAGNOSIS — M77.12 LATERAL EPICONDYLITIS OF LEFT ELBOW: ICD-10-CM

## 2023-01-23 DIAGNOSIS — J01.00 SUBACUTE MAXILLARY SINUSITIS: ICD-10-CM

## 2023-01-23 DIAGNOSIS — J32.1 SINUSITIS CHRONIC, FRONTAL: ICD-10-CM

## 2023-01-23 DIAGNOSIS — E78.5 HYPERLIPIDEMIA, UNSPECIFIED HYPERLIPIDEMIA TYPE: Primary | ICD-10-CM

## 2023-01-23 PROCEDURE — 73090 X-RAY EXAM OF FOREARM: CPT

## 2023-01-23 PROCEDURE — 99214 OFFICE O/P EST MOD 30 MIN: CPT | Performed by: INTERNAL MEDICINE

## 2023-01-23 RX ORDER — AZITHROMYCIN 250 MG/1
TABLET, FILM COATED ORAL
Qty: 6 TABLET | Refills: 0 | Status: SHIPPED | OUTPATIENT
Start: 2023-01-23 | End: 2023-02-20

## 2023-01-23 NOTE — PROGRESS NOTES
01/23/2023    CC: Hypertension (F/U--left arm seems sensitive to cold.) and Balance Issues (Left ear clogged----no other issues)  .        HPI  Hypertension  This is a chronic problem. The current episode started more than 1 year ago. The problem has been resolved since onset. The problem is controlled. Associated symptoms include anxiety.        Ronak Jacobs is a 62 y.o. female.      The following portions of the patient's history were reviewed and updated as appropriate: allergies, current medications, past family history, past medical history, past social history, past surgical history and problem list.    Problem List  Patient Active Problem List   Diagnosis   • Benign essential hypertension   • Fatigue   • Left carotid bruit   • Palpitations   • Hyperlipidemia   • Palpitation   • Precordial pain   • Thyroid nodule   • Heart murmur   • History of adenomatous polyp of colon       Past Medical History  Past Medical History:   Diagnosis Date   • GERD (gastroesophageal reflux disease)    • Glaucoma    • Heart murmur    • History of thyroid cyst    • History of transfusion    • Hyperlipidemia    • Hypertension    • Sinus congestion    • Sleep apnea        Surgical History  Past Surgical History:   Procedure Laterality Date   • CARDIAC CATHETERIZATION N/A 03/15/2019    Procedure: Left Heart Cath;  Surgeon: Graciela Boland MD;  Location:  [facility];  Service: Cardiology   • CARDIAC CATHETERIZATION N/A 03/15/2019    Procedure: Coronary angiography;  Surgeon: Graciela Boland MD;  Location:  [facility];  Service: Cardiology   • COLONOSCOPY     • COLONOSCOPY N/A 10/13/2022    Procedure: COLONOSCOPY TO CECUM;  Surgeon: Yves Berg MD;  [facility];  Service: General;  Laterality: N/A;  HX OF COLON POLYPS  POST: DIVERTICULOSIS   • HYSTERECTOMY     • THYROIDECTOMY, PARTIAL         Family History  Family History   Problem Relation Age of  Onset   • Diabetes Mother    • Heart disease Mother    • Hypertension Mother    • Diabetes Father    • Hypertension Father    • Heart failure Father    • Heart disease Father    • Heart attack Neg Hx    • Malig Hyperthermia Neg Hx        Social History  Social History    Tobacco Use      Smoking status: Never      Smokeless tobacco: Never       Is the Patient a current tobacco user? No    Allergies  Allergies   Allergen Reactions   • Atorvastatin Myalgia   • Niacin Unknown - Low Severity     CANNOT REMEMBER   • Rosuvastatin Myalgia   • Statins Myalgia       Current Medications    Current Outpatient Medications:   •  Alirocumab (PRALUENT) 150 MG/ML injection pen, Inject  under the skin into the appropriate area as directed Every 14 (Fourteen) Days., Disp: , Rfl:   •  aspirin  MG tablet, Take 325 mg by mouth Daily., Disp: , Rfl:   •  escitalopram (LEXAPRO) 10 MG tablet, TAKE ONE TABLET BY MOUTH DAILY, Disp: 90 tablet, Rfl: 3  •  fexofenadine (Allegra Allergy) 180 MG tablet, Take 1 tablet by mouth Daily. (Patient taking differently: Take 180 mg by mouth As Needed.), Disp: 30 tablet, Rfl: 2  •  olmesartan (BENICAR) 40 MG tablet, TAKE ONE TABLET BY MOUTH DAILY, Disp: 90 tablet, Rfl: 4  •  timolol (TIMOPTIC) 0.5 % ophthalmic solution, Administer 1 drop to both eyes 2 (Two) Times a Day., Disp: , Rfl:   •  VITAMIN D PO, Take  by mouth., Disp: , Rfl:   •  amLODIPine (NORVASC) 5 MG tablet, TAKE ONE TABLET BY MOUTH DAILY, Disp: 30 tablet, Rfl: 3  •  azithromycin (Zithromax Z-Reynaldo) 250 MG tablet, Take 2 tablets by mouth on day 1, then 1 tablet daily on days 2-5, Disp: 6 tablet, Rfl: 0  •  famotidine (PEPCID) 20 MG tablet, Take 20 mg by mouth 2 (Two) Times a Day As Needed., Disp: , Rfl:   •  fluticasone (FLONASE) 50 MCG/ACT nasal spray, 2 sprays into the nostril(s) as directed by provider As Needed., Disp: , Rfl:      Review of System  Review of Systems   Constitutional: Negative.    Eyes: Negative.    Respiratory:  Negative.    Cardiovascular: Negative.    Gastrointestinal: Negative.      I have reviewed and confirmed the accuracy of the ROS as documented by the MA/LPN/RN Edward Hawk MD    Vitals:    01/23/23 0812   BP: 120/80   Resp: 16     Body mass index is 32.61 kg/m².    Objective     Physical Exam  Physical Exam  HENT:      Head: Normocephalic.      Comments: Bulging TM noted bilaterally     Nose: Nose normal.   Eyes:      Pupils: Pupils are equal, round, and reactive to light.   Cardiovascular:      Rate and Rhythm: Normal rate.   Pulmonary:      Effort: Pulmonary effort is normal.   Musculoskeletal:      Cervical back: Normal range of motion.   Neurological:      Mental Status: She is alert.         Assessment & Plan      This 62-year-old patient presents today for follow-up of hypertension.     She relates she is taking her medication as prescribed.  Her blood pressure today was excellent at 120/80 in the left arm sitting position standard cuff with her last visit back about 4 months or so ago it was similar.  Patient relates in the interim of visit she had a fall on her left elbow around Thanksgiving and has been feeling pain in the elbow since.  Examination shows that she also has point tenderness over the lateral left epicondyle.    Patient relates all she is she also has balance problems been present for the past several days.  Note is made she has chronic episodes of chronic sinusitis.  She relates that with the last episode she was given Allegra-D and she still has some of this on hand.  She relates that she feels off balance at times.  Physical examination shows a bulging TM.    In the interim of visit she had colonoscopy done by Dr. Hameed however there is not a report at this time.    Patient complains of feeling cold and we note that she has gained 7 pounds from her last visit.  She had a previous history of partial thyroidectomy.  Diagnoses and all orders for this visit:    1. Hyperlipidemia,  unspecified hyperlipidemia type (Primary)    2. Labyrinthitis, unspecified laterality    3. Primary hypertension    4. Subacute maxillary sinusitis    5. Lateral epicondylitis of left elbow  -     Ambulatory Referral to Orthopedic Surgery  -     XR Forearm 2 View Left (In Office)    6. Sinusitis chronic, frontal    7. Other specified hypothyroidism  -     TSH    Other orders  -     azithromycin (Zithromax Z-Reynaldo) 250 MG tablet; Take 2 tablets by mouth on day 1, then 1 tablet daily on days 2-5  Dispense: 6 tablet; Refill: 0        Plan:  1.)  Zithromax 250 mg 2 tablets now then 1 tab p.o. daily  2.)  X-ray of the left elbow to evaluate status post fall  3.)  Referral to orthopedic surgery for evaluation of left lateral epicondylitis.  4.)  Follow-up in 4 to 5 months with physical examination.  5.)  Allegra-D 1 tablet daily for sinusitis.  6.)  She is asked to call us within a week if there is no improvement in her sinus symptoms.  7.)  TSH to evaluate for hypothyroidism       Edward Hawk MD  01/23/2023

## 2023-01-24 ENCOUNTER — TELEPHONE (OUTPATIENT)
Dept: FAMILY MEDICINE CLINIC | Facility: CLINIC | Age: 63
End: 2023-01-24
Payer: COMMERCIAL

## 2023-01-24 NOTE — TELEPHONE ENCOUNTER
----- Message from Edward Hawk MD sent at 1/23/2023 10:46 PM EST -----  Your recent x-ray results showed no evidence of any fracture.

## 2023-01-25 ENCOUNTER — OFFICE VISIT (OUTPATIENT)
Dept: SPORTS MEDICINE | Facility: CLINIC | Age: 63
End: 2023-01-25
Payer: COMMERCIAL

## 2023-01-25 VITALS
OXYGEN SATURATION: 94 % | WEIGHT: 208 LBS | BODY MASS INDEX: 32.65 KG/M2 | TEMPERATURE: 98.1 F | SYSTOLIC BLOOD PRESSURE: 159 MMHG | HEART RATE: 66 BPM | HEIGHT: 67 IN | DIASTOLIC BLOOD PRESSURE: 90 MMHG

## 2023-01-25 DIAGNOSIS — G56.22 ULNAR NEUROPATHY AT ELBOW, LEFT: Primary | ICD-10-CM

## 2023-01-25 DIAGNOSIS — M75.42 SUBACROMIAL IMPINGEMENT OF LEFT SHOULDER: ICD-10-CM

## 2023-01-25 DIAGNOSIS — R29.898 LEFT ARM WEAKNESS: ICD-10-CM

## 2023-01-25 PROCEDURE — 99214 OFFICE O/P EST MOD 30 MIN: CPT | Performed by: STUDENT IN AN ORGANIZED HEALTH CARE EDUCATION/TRAINING PROGRAM

## 2023-01-26 ENCOUNTER — PATIENT ROUNDING (BHMG ONLY) (OUTPATIENT)
Dept: SPORTS MEDICINE | Facility: CLINIC | Age: 63
End: 2023-01-26
Payer: COMMERCIAL

## 2023-01-26 NOTE — PROGRESS NOTES
January 26, 2023    A Welcome Card has been sent to the patient for PATIENT ROUNDING with [facility]

## 2023-01-30 RX ORDER — AMLODIPINE BESYLATE 5 MG/1
TABLET ORAL
Qty: 30 TABLET | Refills: 3 | Status: SHIPPED | OUTPATIENT
Start: 2023-01-30

## 2023-02-01 ENCOUNTER — TREATMENT (OUTPATIENT)
Dept: PHYSICAL THERAPY | Facility: CLINIC | Age: 63
End: 2023-02-01
Payer: COMMERCIAL

## 2023-02-01 DIAGNOSIS — M75.42 SUBACROMIAL IMPINGEMENT OF LEFT SHOULDER: ICD-10-CM

## 2023-02-01 DIAGNOSIS — M25.512 LEFT SHOULDER PAIN, UNSPECIFIED CHRONICITY: Primary | ICD-10-CM

## 2023-02-01 DIAGNOSIS — G56.22 ULNAR NEUROPATHY AT ELBOW OF LEFT UPPER EXTREMITY: ICD-10-CM

## 2023-02-01 PROCEDURE — 97530 THERAPEUTIC ACTIVITIES: CPT | Performed by: PHYSICAL THERAPIST

## 2023-02-01 PROCEDURE — 97161 PT EVAL LOW COMPLEX 20 MIN: CPT | Performed by: PHYSICAL THERAPIST

## 2023-02-01 PROCEDURE — 97110 THERAPEUTIC EXERCISES: CPT | Performed by: PHYSICAL THERAPIST

## 2023-02-01 NOTE — PROGRESS NOTES
"Physical Therapy Initial Evaluation and Plan of Care  [address], [address], [place] [address]    Patient: Bruna Jacobs   : 1960  Diagnosis/ICD-10 Code:  Left shoulder pain, unspecified chronicity [M25.512]  Referring practitioner: Caro Arenas DO    Subjective Evaluation    History of Present Illness  Onset date: 2022.  Mechanism of injury: Patient reports a posterior fall while walking up steps on 2022, falling down 5 steps and doing a backward flip on the way down, landing onto her (L) side, namely (L) elbow and scapular region.  She did have a contusion on her elbow.  She had pain in the entire (L) UE initially and onset of (L) forearm tingling travelling down into digits 4 and 5 in the past 2 weeks.  Patient presented to her PCP about 3 weeks ago where xray was negative for fx and patient was referred to Dr. Arenas.    (L) shoulder pain is mildly improved but persistent.  (L) UE is tender to pressure and paresthesia (L) forearm and digits 4-5 persists.  She has been avoiding her normal housework activities such as sweeping due to elbow pain.      PMH notable for HTN, GERD, glaucoma.      Patient Occupation: Retired; drives her grandchildren Quality of life: good    Pain  Current pain ratin (4/10 (L) shoulder, 5/10 (L) elbow)  At best pain ratin  Pain scale at highest: 8/10 (L) shoulder, 9/10 (L) elbow.  Location: (L) posterior shoulder/scapular region; (L) olecranon process  Quality: shoulder \"sore\", elbow \"stabbing\"  Relieving factors: support (Voltaren cream, elbow sleeve)  Aggravating factors: lifting, movement, overhead activity, repetitive movement and outstretched reach (pressure/leaning on (L) UE)  Progression: improved (mildly improved)    Social Support  Lives with: spouse    Hand dominance: right (\"I'm right handed but I use my left for everything\")    Diagnostic Tests  X-ray: normal (L) elbow imaging negative    Patient Goals  Patient goals for " "therapy: decreased pain  Patient goal: \"I don't want it to hurt\" - patient indicates (L) elbow           Subjective Questionnaire: QuickDASH: 25%    Objective          Tenderness     Additional Tenderness Details  Mod (+) TTP (L) anterior/superior/posterior GH joint line, (L) superomedial scapular border  Mod/max (+) TTP (L) olecranon    Neurological Testing     Sensation     Elbow   Left Elbow   Intact: light touch    Right Elbow   Intact: light touch    Additional Neurological Details  Intact/equal (B) UE dermatomes    Active Range of Motion   Cervical/Thoracic Spine   Cervical    Flexion: WFL  Extension: WFL  Left lateral flexion: WFL  Right lateral flexion: WFL  Left rotation: WFL  Right rotation: WFL  Left Shoulder   Flexion: 122 degrees with pain  Extension: 69 degrees   Abduction: 144 degrees with pain  External rotation 90°: 82 degrees with pain  Internal rotation 90°: 79 degrees     Right Shoulder   Flexion: 175 degrees   Extension: 66 degrees   Abduction: 180 degrees   External rotation 90°: 93 degrees  Internal rotation 90°: 72 degrees     Left Elbow   Flexion: 132 (L) olecranon pain degrees with pain  Extension: 10 (L) anterior shoulder pain degrees with pain  Forearm supination: 82 (L) elbow pain degrees with pain  Forearm pronation: 90 degrees     Right Elbow   Flexion: 147 degrees   Extension: 0 degrees   Forearm supination: 85 degrees   Forearm pronation: 90 degrees     Additional Active Range of Motion Details  Mild (L) UT and superomedial scapular border pain with (L) cervical rotation, but all cervical AROM is WFL  (R) elbow 6 deg hyperextension    Strength/Myotome Testing     Left Shoulder     Planes of Motion   Flexion: 4-   Extension: 4   Abduction: 4-   External rotation at 0°: 3+   Internal rotation at 0°: 4-     Right Shoulder     Planes of Motion   Flexion: 4+   Extension: 4+   Abduction: 4+   External rotation at 0°: 4   Internal rotation at 0°: 4+     Left Elbow   Flexion: 4-  Extension: " "3+    Right Elbow   Flexion: 4+  Extension: 4    Left Wrist/Hand      (2nd hand position)     Trial 1: 16 lbs    Trial 2: 11 lbs    Trial 3: 7 lbs    Average: 11.33 lbs    Comments: \"feels weak and painful\"    Right Wrist/Hand      (2nd hand position)     Trial 1: 44 lbs    Trial 2: 39 lbs    Trial 3: 36 lbs    Average: 39.67 lbs          Assessment & Plan     Assessment  Impairments: abnormal coordination, abnormal muscle firing, abnormal or restricted ROM, activity intolerance, impaired physical strength, lacks appropriate home exercise program and pain with function  Functional Limitations: carrying objects, lifting, pulling, pushing, uncomfortable because of pain, reaching behind back, reaching overhead and unable to perform repetitive tasks  Assessment details: Patient is a 62 y.o female who reports onset of (L) shoulder pain and (L) elbow pain and paresthesia in November 2022 after falling backward down about 5 steps.  She suffered a contusion to the (L) elbow but radiographs ruled out fracture.  She reports (L) shoulder pain 0-8/10 and (L) elbow pain 0-9/10, worst with ROM, pressure to the elbow, reaching, lifting, pushing, and pulling.  She also reports paresthesia in an apparent ulnar distribution in the (L) UE.  She exhibits localized tenderness of the (L) shoulder and elbow, decreased (L) UE AROM, and decreased (L) UE strength. Her  strength is significantly limited (L) UE vs (R) UE.  Her QuickDASH score is 25% indicating a significant perceived level of functional limitation.  She will benefit from skilled PT services to address these deficits and optimize functional recovery of (L) UE.  Prognosis: good    Goals  Plan Goals: STGs: to be met in 6 weeks  1. Patient will be independent with initial HEP  2. Patient will report improved (L) shoulder and elbow symptoms 0-4/10 for improved ADL performance  3. Patient will report 50% reduction in intensity and frequency of (L) UE paresthesia as " evidence of appropriate healing  4. Patient will exhibit resolution of (L) shoulder tenderness as evidence of appropriate healing  5. Patient will demonstrate improved (L) shoulder AROM elevation >/= 150 deg for improved overhead reach    LTGs: to be met in 12 weeks  1. Patient will be independent with progressed HEP  2. Patient will report resolution of (L) shoulder and elbow symptoms as well as (L) UE paresthesia for improved functional use of (L) UE  3. Patient will demonstrate improved (L) shoulder and elbow AROM to WFL for improved activity tolerance  4. Patient will demonstrate improved (L) UE strength grossly >/= 4+/5 and (L) UE  >/= 30# for improved function  5. Patient will have improved QuickDASH score </= 10% for subjective evidence of functional improvement    Plan  Therapy options: will be seen for skilled therapy services  Planned modality interventions: cryotherapy and electrical stimulation/Russian stimulation  Planned therapy interventions: ADL retraining, fine motor coordination training, flexibility, functional ROM exercises, home exercise program, joint mobilization, manual therapy, neuromuscular re-education, soft tissue mobilization, strengthening, stretching and therapeutic activities  Frequency: 2x week  Duration in weeks: 12  Treatment plan discussed with: patient        Manual Therapy:         mins  79021;  Therapeutic Exercise:    19     mins  70444;     Neuromuscular Ting:        mins  13586;    Therapeutic Activity:     9     mins  34969;     Gait Training:           mins  93325;     Ultrasound:          mins  22457;    Electrical Stimulation:         mins  41169 ( );  Dry Needling          mins self-pay    Timed Treatment:   28   mins   Total Treatment:     41   mins    PT SIGNATURE: Richa Sheriff PT, DPT, OCS  Electronically signed by: Richa Sheriff PT, 02/01/23, 8:24 AM EST  [place] License #[id number]     DATE TREATMENT INITIATED: 2/1/2023    Initial Certification  Certification  Period: 2/1/2023 thru 5/1/2023  I certify that the therapy services are furnished while this patient is under my care.  The services outlined above are required by this patient, and will be reviewed every 90 days.     PHYSICIAN: Caro Arenas DO  [phone]                                          DATE:     Please sign and return via fax to [phone]. Thank you, [facility].

## 2023-02-07 ENCOUNTER — TREATMENT (OUTPATIENT)
Dept: PHYSICAL THERAPY | Facility: CLINIC | Age: 63
End: 2023-02-07
Payer: COMMERCIAL

## 2023-02-07 DIAGNOSIS — G56.22 ULNAR NEUROPATHY AT ELBOW OF LEFT UPPER EXTREMITY: ICD-10-CM

## 2023-02-07 DIAGNOSIS — M25.512 LEFT SHOULDER PAIN, UNSPECIFIED CHRONICITY: Primary | ICD-10-CM

## 2023-02-07 DIAGNOSIS — M75.42 SUBACROMIAL IMPINGEMENT OF LEFT SHOULDER: ICD-10-CM

## 2023-02-07 PROCEDURE — 97110 THERAPEUTIC EXERCISES: CPT | Performed by: PHYSICAL THERAPIST

## 2023-02-07 NOTE — PROGRESS NOTES
Physical Therapy Daily Treatment Note  [address], [address]  [place] [address]  Visit # 2      Subjective Evaluation    History of Present Illness    Subjective comment: Pt reports that she has been getting sore the day after her doing her HEP.  Pain  Current pain ratin           Objective   See Exercise, Manual, and Modality Logs for complete treatment.   Added Wrist flexion/extension stretch, Resisted shlder Ext, and hitch hikers    Assessment & Plan     Assessment    Assessment details: Pt completed treatment with minimal c/o pain in (L) shoulder or elbow. Pt tolerated the addition of shoulder strengthening and wrist stretching exercises with no issue.  Pt's HEP was updated and given to her. Explained to pt that she expect to be sore in the short term after performing her HEP.                     Manual Therapy:    0     mins  80807;  Therapeutic Exercise:    24     mins  04934;     Neuromuscular Ting:    0    mins  74278;    Therapeutic Activity:     0     mins  80427;     Gait Trainin     mins  00181;     Ultrasound:     0     mins  91425;    Work Hardening           0      mins 22107  Iontophoresis               0   mins 61586  E-Stim                          _0_ mins 16748 ( )    Timed Treatment:   24   mins   Total Treatment:     24   mins    Dat Varma PTA  Physical Therapist Assistant

## 2023-02-09 ENCOUNTER — TREATMENT (OUTPATIENT)
Dept: PHYSICAL THERAPY | Facility: CLINIC | Age: 63
End: 2023-02-09
Payer: COMMERCIAL

## 2023-02-09 DIAGNOSIS — G56.22 ULNAR NEUROPATHY AT ELBOW OF LEFT UPPER EXTREMITY: ICD-10-CM

## 2023-02-09 DIAGNOSIS — M25.512 LEFT SHOULDER PAIN, UNSPECIFIED CHRONICITY: Primary | ICD-10-CM

## 2023-02-09 DIAGNOSIS — M75.42 SUBACROMIAL IMPINGEMENT OF LEFT SHOULDER: ICD-10-CM

## 2023-02-09 PROCEDURE — 97110 THERAPEUTIC EXERCISES: CPT | Performed by: PHYSICAL THERAPIST

## 2023-02-09 RX ORDER — AMLODIPINE BESYLATE 5 MG/1
5 TABLET ORAL DAILY
Qty: 30 TABLET | Refills: 3 | OUTPATIENT
Start: 2023-02-09

## 2023-02-09 NOTE — PROGRESS NOTES
"Physical Therapy Daily Treatment Note  [address], [address]  [place] [address]  Visit # 3      Subjective Evaluation    History of Present Illness    Subjective comment: Pt reports that her (L) elbow is \"pretty good\".  Still has soreness in (L) shoulder and wrist.  Pain ratings 3/10(L) elbow/4/10 (L) shoulder       Objective   See Exercise, Manual, and Modality Logs for complete treatment.   Added Shoulder Isometrics-Flex/IR/ER/abd    Assessment & Plan     Assessment    Assessment details: Pt completed treatment with no c/o pain in (L) shoulder/elbow/wrist.  Pt tolerated the introduction of shoulder isometrics with no issues.  Pt's HEP was updated and given to her.                     Manual Therapy:    0     mins  68149;  Therapeutic Exercise:    31     mins  89092;     Neuromuscular Ting:    0    mins  53538;    Therapeutic Activity:     0     mins  60132;     Gait Trainin     mins  56853;     Ultrasound:     0     mins  79994;    Work Hardening           0      mins 69531  Iontophoresis               0   mins 00813  E-Stim                          _0_ mins 82256 ( )    Timed Treatment:   31   mins   Total Treatment:     31   mins    Dat Varma PTA  Physical Therapist Assistant  "

## 2023-02-14 ENCOUNTER — TREATMENT (OUTPATIENT)
Dept: PHYSICAL THERAPY | Facility: CLINIC | Age: 63
End: 2023-02-14
Payer: COMMERCIAL

## 2023-02-14 DIAGNOSIS — M75.42 SUBACROMIAL IMPINGEMENT OF LEFT SHOULDER: ICD-10-CM

## 2023-02-14 DIAGNOSIS — G56.22 ULNAR NEUROPATHY AT ELBOW OF LEFT UPPER EXTREMITY: ICD-10-CM

## 2023-02-14 DIAGNOSIS — M25.512 LEFT SHOULDER PAIN, UNSPECIFIED CHRONICITY: Primary | ICD-10-CM

## 2023-02-14 PROCEDURE — 97110 THERAPEUTIC EXERCISES: CPT | Performed by: PHYSICAL THERAPIST

## 2023-02-14 NOTE — PROGRESS NOTES
Physical Therapy Daily Treatment Note  [address], [address]  [place] [address]  Visit # 4      Subjective Evaluation    History of Present Illness    Subjective comment: Pt reports that her (L) UE is still sore.       Objective   See Exercise, Manual, and Modality Logs for complete treatment.   Added resisted wrist flexion/ext, and TB row    Assessment & Plan     Assessment    Assessment details: Pt completed treatment with minimal c/o pain in (L) shoulder, UE,Forearm.  Pt tolerated the addition of wrist strengthening exercises with no issues.  Continue to progress as tolerated.                     Manual Therapy:    0     mins  37249;  Therapeutic Exercise:    33     mins  91825;     Neuromuscular Ting:    0    mins  35655;    Therapeutic Activity:     0     mins  21981;     Gait Trainin     mins  49555;     Ultrasound:     0     mins  24438;    Work Hardening           0      mins 34778  Iontophoresis               0   mins 98097  E-Stim                          _0_ mins 70180 ( )    Timed Treatment:   33   mins   Total Treatment:     33   mins    Dat Varma PTA  Physical Therapist Assistant

## 2023-02-16 ENCOUNTER — TREATMENT (OUTPATIENT)
Dept: PHYSICAL THERAPY | Facility: CLINIC | Age: 63
End: 2023-02-16
Payer: COMMERCIAL

## 2023-02-16 DIAGNOSIS — G56.22 ULNAR NEUROPATHY AT ELBOW OF LEFT UPPER EXTREMITY: ICD-10-CM

## 2023-02-16 DIAGNOSIS — M75.42 SUBACROMIAL IMPINGEMENT OF LEFT SHOULDER: ICD-10-CM

## 2023-02-16 DIAGNOSIS — M25.512 LEFT SHOULDER PAIN, UNSPECIFIED CHRONICITY: Primary | ICD-10-CM

## 2023-02-16 PROCEDURE — 97110 THERAPEUTIC EXERCISES: CPT | Performed by: PHYSICAL THERAPIST

## 2023-02-16 NOTE — PROGRESS NOTES
Physical Therapy Daily Treatment Note  [address], [address]  [place] [address]  Visit # 5      Subjective Evaluation    History of Present Illness    Subjective comment: Pt reports that her (L) shoulder and forearm are feeling a lot better.  Just some pain in forearm       Objective   See Exercise, Manual, and Modality Logs for complete treatment.   Added carli shlder Ext, AROM shlder flexion/scaption/abd , forearm pronation/supination    Assessment & Plan     Assessment    Assessment details: Pt completed treatment with no c/o pain in (L) shoulder or forearm.  She tolerated the progression in resistance and addition of new strengthening exercises with no issues.  Plan to continue to progress functional strengthening of shoulder and forearm.                     Manual Therapy:    0     mins  17982;  Therapeutic Exercise:    39     mins  61086;     Neuromuscular Ting:    0    mins  42618;    Therapeutic Activity:     0     mins  49788;     Gait Trainin     mins  66187;     Ultrasound:     0     mins  57328;    Work Hardening           0      mins 68000  Iontophoresis               0   mins 26378  E-Stim                          _0_ mins 40143 ( )    Timed Treatment:   39   mins   Total Treatment:     39   mins    Dat Varma PTA  Physical Therapist Assistant

## 2023-02-20 ENCOUNTER — OFFICE VISIT (OUTPATIENT)
Dept: CARDIOLOGY | Facility: CLINIC | Age: 63
End: 2023-02-20
Payer: COMMERCIAL

## 2023-02-20 VITALS
HEIGHT: 67 IN | BODY MASS INDEX: 32.65 KG/M2 | HEART RATE: 53 BPM | SYSTOLIC BLOOD PRESSURE: 142 MMHG | DIASTOLIC BLOOD PRESSURE: 82 MMHG | WEIGHT: 208 LBS

## 2023-02-20 DIAGNOSIS — I10 ESSENTIAL HYPERTENSION: ICD-10-CM

## 2023-02-20 DIAGNOSIS — R00.2 PALPITATION: ICD-10-CM

## 2023-02-20 DIAGNOSIS — R01.1 HEART MURMUR: Primary | ICD-10-CM

## 2023-02-20 PROCEDURE — 99214 OFFICE O/P EST MOD 30 MIN: CPT | Performed by: INTERNAL MEDICINE

## 2023-02-20 PROCEDURE — 93000 ELECTROCARDIOGRAM COMPLETE: CPT | Performed by: INTERNAL MEDICINE

## 2023-02-20 NOTE — PROGRESS NOTES
1 yr follow up    Subjective:        Bruna Jacobs is a 62 y.o. female who here for follow up    CC  Follow-up hypertension palpitation heart murmur  HPI  62-year-old female with benign essential arterial hypertension heart murmur and palpitation here for the follow-up with no complaints of chest pain tightness heaviness or the pressure sensation     Problems Addressed this Visit        Cardiac and Vasculature    Essential hypertension    Palpitation    Relevant Orders    Treadmill Stress Test    Adult Transthoracic Echo Complete W/ Cont if Necessary Per Protocol    Heart murmur - Primary    Relevant Orders    Treadmill Stress Test    Adult Transthoracic Echo Complete W/ Cont if Necessary Per Protocol   Diagnoses       Codes Comments    Heart murmur    -  Primary ICD-10-CM: R01.1  ICD-9-CM: 785.2     Palpitation     ICD-10-CM: R00.2  ICD-9-CM: 785.1     Essential hypertension     ICD-10-CM: I10  ICD-9-CM: 401.9         .    The following portions of the patient's history were reviewed and updated as appropriate: allergies, current medications, past family history, past medical history, past social history, past surgical history and problem list.    Past Medical History:   Diagnosis Date   • GERD (gastroesophageal reflux disease)    • Glaucoma    • Heart murmur    • History of thyroid cyst    • History of transfusion    • Hyperlipidemia    • Hypertension    • Sinus congestion    • Sleep apnea      reports that she has never smoked. She has never used smokeless tobacco. She reports current alcohol use. She reports that she does not use drugs.   Family History   Problem Relation Age of Onset   • Diabetes Mother    • Heart disease Mother    • Hypertension Mother    • Diabetes Father    • Hypertension Father    • Heart failure Father    • Heart disease Father    • Heart attack Neg Hx    • Malig Hyperthermia Neg Hx        Review of Systems  Constitutional: No wt loss, fever, fatigue  Gastrointestinal: No nausea,  "abdominal pain  Behavioral/Psych: No insomnia or anxiety   Cardiovascular no chest pains or tightness in the chest  Objective:       Physical Exam  /82   Pulse 53   Ht 170.2 cm (67.01\")   Wt 94.3 kg (208 lb)   LMP  (LMP Unknown)   BMI 32.57 kg/m²   General appearance: No acute changes   Neck: Trachea midline; NECK, supple, no thyromegaly or lymphadenopathy   Lungs: Normal size and shape, normal breath sounds, equal distribution of air, no rales and rhonchi   CV: S1-S2 regular, no murmurs, no rub, no gallop   Abdomen: Soft, nontender; no masses , no abnormal abdominal sounds   Extremities: No deformity , normal color , no peripheral edema   Skin: Normal temperature, turgor and texture; no rash, ulcers            ECG 12 Lead    Date/Time: 2/20/2023 9:47 AM  Performed by: Graciela Boland MD  Authorized by: Graciela Boland MD   Comparison: compared with previous ECG   Similar to previous ECG  Rhythm: sinus rhythm  ST Flattening: all    Clinical impression: non-specific ECG              Echocardiogram:        Current Outpatient Medications:   •  Alirocumab (PRALUENT) 150 MG/ML injection pen, Inject  under the skin into the appropriate area as directed Every 14 (Fourteen) Days., Disp: , Rfl:   •  amLODIPine (NORVASC) 5 MG tablet, TAKE ONE TABLET BY MOUTH DAILY, Disp: 30 tablet, Rfl: 3  •  aspirin  MG tablet, Take 325 mg by mouth Daily., Disp: , Rfl:   •  escitalopram (LEXAPRO) 10 MG tablet, TAKE ONE TABLET BY MOUTH DAILY, Disp: 90 tablet, Rfl: 3  •  famotidine (PEPCID) 20 MG tablet, Take 20 mg by mouth 2 (Two) Times a Day As Needed., Disp: , Rfl:   •  fexofenadine (Allegra Allergy) 180 MG tablet, Take 1 tablet by mouth Daily. (Patient taking differently: Take 180 mg by mouth As Needed.), Disp: 30 tablet, Rfl: 2  •  fluticasone (FLONASE) 50 MCG/ACT nasal spray, 2 sprays into the nostril(s) as directed by provider As Needed., Disp: , Rfl:   •  olmesartan (BENICAR) 40 MG tablet, TAKE ONE TABLET " BY MOUTH DAILY, Disp: 90 tablet, Rfl: 4  •  timolol (TIMOPTIC) 0.5 % ophthalmic solution, Administer 1 drop to both eyes 2 (Two) Times a Day., Disp: , Rfl:   •  VITAMIN D PO, Take  by mouth., Disp: , Rfl:    Assessment:        Patient Active Problem List   Diagnosis   • Essential hypertension   • Fatigue   • Left carotid bruit   • Palpitations   • Hyperlipidemia   • Palpitation   • Precordial pain   • Thyroid nodule   • Heart murmur   • History of adenomatous polyp of colon               Plan:            ICD-10-CM ICD-9-CM   1. Heart murmur  R01.1 785.2   2. Palpitation  R00.2 785.1   3. Essential hypertension  I10 401.9     1. Heart murmur  Considering the patient's symptoms as well as clinical situation and  EKG findings, along with cardiac risk factors, ischemic workup is necessary to rule out ischemic cardiomyopathy, stress induced arrhythmias, and functional capacity for diagnosis as well as prognostic consideration    - Treadmill Stress Test  - Adult Transthoracic Echo Complete W/ Cont if Necessary Per Protocol    2. Palpitation  Considering patient's medical condition as well as the risk factors, patient will require echocardiogram for further evaluation for the LV function, four-chamber evaluation, including the pressures, valvular function and  pericardial disease and pericardial effusion    - Treadmill Stress Test  - Adult Transthoracic Echo Complete W/ Cont if Necessary Per Protocol    3. Essential hypertension  Continue current treatment       bp better at home    Ett, echo    Follow up  COUNSELING:    Bruna Price was given to patient for the following topics: diagnostic results, risk factor reductions, impressions, risks and benefits of treatment options and importance of treatment compliance .       SMOKING COUNSELING:        Dictated using Dragon dictation

## 2023-02-22 ENCOUNTER — TREATMENT (OUTPATIENT)
Dept: PHYSICAL THERAPY | Facility: CLINIC | Age: 63
End: 2023-02-22
Payer: COMMERCIAL

## 2023-02-22 DIAGNOSIS — M75.42 SUBACROMIAL IMPINGEMENT OF LEFT SHOULDER: ICD-10-CM

## 2023-02-22 DIAGNOSIS — M25.512 LEFT SHOULDER PAIN, UNSPECIFIED CHRONICITY: Primary | ICD-10-CM

## 2023-02-22 DIAGNOSIS — G56.22 ULNAR NEUROPATHY AT ELBOW OF LEFT UPPER EXTREMITY: ICD-10-CM

## 2023-02-22 PROCEDURE — 97112 NEUROMUSCULAR REEDUCATION: CPT | Performed by: PHYSICAL THERAPIST

## 2023-02-22 PROCEDURE — 97110 THERAPEUTIC EXERCISES: CPT | Performed by: PHYSICAL THERAPIST

## 2023-02-22 NOTE — PROGRESS NOTES
Physical Therapy Daily Treatment Note      [address] [facility], [address], [place] [id number]    Patient: Bruna Jacobs   : 1960  Referring practitioner: Caro Arenas DO  Date of Initial Visit: Type: THERAPY  Noted: 2023  Today's Date: 2023  Patient seen for 6 sessions         Visit Diagnoses:     ICD-10-CM ICD-9-CM   1. Left shoulder pain, unspecified chronicity  M25.512 719.41   2. Subacromial impingement of left shoulder  M75.42 726.19   3. Ulnar neuropathy at elbow of left upper extremity  G56.22 354.2         Subjective Evaluation    History of Present Illness    Subjective comment: My arm was feeling better until I hit my elbow on the wall last night.  Before that my arm was feeling about 40% better.  The rain affects it and makes it achey. I'm not having any numbness or tingling in my arm or fingers now.Pain  Current pain ratin  Location: (L) olecranon, proximal ulna           Objective   See Exercise, Manual, and Modality Logs for complete treatment.       Assessment & Plan     Assessment    Assessment details: Patient reports irritation of (L) olecranon symptoms as she bumped her elbow on the wall yesterday.  She is able to complete all activities of current HEP, but she does benefit from frequent verbal and tactile cueing for proper performance and muscle activation (namely, scapular adduction) as well as intermittent demo of PT.  She fatigues quickly with (L) shoulder and forearm strengthening activities.            Progress per Plan of Care         Timed:  Manual Therapy:         mins  52709;  Therapeutic Exercise:    26     mins  35741;     Neuromuscular Ting:    10    mins  75380;    Therapeutic Activity:          mins  98123;     Gait Training:           mins  97223;     Ultrasound:          mins  14232;    Untimed:  Electrical Stimulation:         mins  23190 ( );  Mechanical Traction:         mins  77583;   Dry Needling              ___  mins   80198    Timed  Treatment:   36   mins   Total Treatment:     36   mins    Richa Sheriff PT, DPT, OCS  Physical Therapist  [place] License #[id number]  Electronically signed by: Richa Sheriff PT, 02/22/23, 8:20 AM EST

## 2023-02-24 ENCOUNTER — TREATMENT (OUTPATIENT)
Dept: PHYSICAL THERAPY | Facility: CLINIC | Age: 63
End: 2023-02-24
Payer: COMMERCIAL

## 2023-02-24 DIAGNOSIS — M25.512 LEFT SHOULDER PAIN, UNSPECIFIED CHRONICITY: Primary | ICD-10-CM

## 2023-02-24 DIAGNOSIS — G56.22 ULNAR NEUROPATHY AT ELBOW OF LEFT UPPER EXTREMITY: ICD-10-CM

## 2023-02-24 DIAGNOSIS — M75.42 SUBACROMIAL IMPINGEMENT OF LEFT SHOULDER: ICD-10-CM

## 2023-02-24 PROCEDURE — 97530 THERAPEUTIC ACTIVITIES: CPT | Performed by: PHYSICAL THERAPIST

## 2023-02-24 PROCEDURE — 97110 THERAPEUTIC EXERCISES: CPT | Performed by: PHYSICAL THERAPIST

## 2023-02-24 NOTE — PROGRESS NOTES
Physical Therapy Daily Treatment Note      [address] [facility], [address], [place] [id number]    Patient: Bruna Jacobs   : 1960  Referring practitioner: Caro Arenas DO  Date of Initial Visit: Type: THERAPY  Noted: 2023  Today's Date: 2023  Patient seen for 7 sessions         Visit Diagnoses:     ICD-10-CM ICD-9-CM   1. Left shoulder pain, unspecified chronicity  M25.512 719.41   2. Subacromial impingement of left shoulder  M75.42 726.19   3. Ulnar neuropathy at elbow of left upper extremity  G56.22 354.2         Subjective Evaluation    History of Present Illness    Subjective comment: My arm is not as sore as it was earlier this week after I hit my elbow.  My left (distal forearm, wrist) is hurting some but my shoulder is not as bad.Pain  Pain scale: 4-5/10 (L) distal forearm, wrist; minimal symptoms (L) shoulder.           Objective   See Exercise, Manual, and Modality Logs for complete treatment.   *Initiated UBE FW/RETRO and sidelying shoulder ER  *Increased wrist flexion and extension weight to 2#    Assessment & Plan     Assessment    Assessment details: Patient experiences pain with retro UBE but is able to complete activity.  She experiences some shoulder discomfort with shoulder abduction AROM.  She c/o some fatigue and discomfort (L) shoulder with abduction and external rotation isometrics.  She will benefit from progression of shoulder and (L) UE strengthening activities to improve functional strength and muscular endurance with daily activities.           Other - reassess for MD.         Timed:  Manual Therapy:         mins  29773;  Therapeutic Exercise:    37     mins  73557;     Neuromuscular Ting:        mins  76099;    Therapeutic Activity:     12     mins  54696;     Gait Training:           mins  23281;     Ultrasound:          mins  92059;    Untimed:  Electrical Stimulation:         mins  10336 ( );  Mechanical Traction:         mins  80169;   Dry Needling               ___  mins   72130    Timed Treatment:   49   mins   Total Treatment:     49   mins    Richa Sheriff PT, DPT, OCS  Physical Therapist  [place] License #[id number]  Electronically signed by: Richa Sheriff PT, 02/24/23, 8:10 AM EST

## 2023-03-03 ENCOUNTER — TREATMENT (OUTPATIENT)
Dept: PHYSICAL THERAPY | Facility: CLINIC | Age: 63
End: 2023-03-03
Payer: COMMERCIAL

## 2023-03-03 DIAGNOSIS — M25.512 LEFT SHOULDER PAIN, UNSPECIFIED CHRONICITY: Primary | ICD-10-CM

## 2023-03-03 DIAGNOSIS — M75.42 SUBACROMIAL IMPINGEMENT OF LEFT SHOULDER: ICD-10-CM

## 2023-03-03 DIAGNOSIS — G56.22 ULNAR NEUROPATHY AT ELBOW OF LEFT UPPER EXTREMITY: ICD-10-CM

## 2023-03-03 PROCEDURE — 97112 NEUROMUSCULAR REEDUCATION: CPT | Performed by: PHYSICAL THERAPIST

## 2023-03-03 PROCEDURE — 97530 THERAPEUTIC ACTIVITIES: CPT | Performed by: PHYSICAL THERAPIST

## 2023-03-03 PROCEDURE — 97110 THERAPEUTIC EXERCISES: CPT | Performed by: PHYSICAL THERAPIST

## 2023-03-03 NOTE — PROGRESS NOTES
Physical Therapy Progress Note      3/3/2023  Caro Arenas DO    Re: Bruna Jacobs  ________________________________________________________________    Visit # 8    Mrs. Bruna Jacobs, has attended 8/8 PT sessions.  Treatment has consisted of: patient education, therapeutic activity, therapeutic exercise, and neuromuscular re-education.     Subjective Evaluation    History of Present Illness    Subjective comment: My shoulder and elbow have been feeling a lot better.  The weather does seem to affect how it feels. My elbow only bothers me when I put pressure on it.  The shoulder bothers me with any range of motion and my (L) wrist bothers me with a lot of lifting type of activities. I do feel like my strength is coming back, and the pain is getting better except with the weather being unpredictable.  I'm probably 30-40% better.  I think if I continue my exercises I will get the rest of the way there.  Pain  Pain scale: 6/10 (L) posterior shoulder, 4-5/10 (L) elbow.           Objective          Active Range of Motion   Left Shoulder   Flexion: 140 (posterior shoulder pain) degrees with pain  Abduction: 120 (L) anterior shoulder pain degrees with pain  External rotation 90°: 85 degrees   Internal rotation 90°: 80 (L) proximal upper arm pain degrees with pain    Left Elbow   Flexion: 138 (mild anterior cubital soreness) degrees   Extension: 3 degrees   Forearm supination: 76 degrees   Forearm pronation: 90 degrees     Strength/Myotome Testing     Left Shoulder     Planes of Motion   Flexion: 4+   Abduction: 4   External rotation at 0°: 4   Internal rotation at 0°: 4     Left Elbow   Forearm supination: 4  Forearm pronation: 4      See Exercise, Manual, and Modality Logs for complete treatment.       Assessment & Plan     Assessment    Assessment details: Patient has been compliant and motivated with PT interventions.  She reports about 30-40% improvement overall in her (L) shoulder and elbow symptoms, however  they are variable in presentation which patient attributes to effects of the weather.  She is tolerating a fairly comprehensive stretching, ROM, and strengthening program with minimal symptom exacerbation. She demonstrates improvements in (L) shoulder flexion AROM as well as (L) elbow flexion and extension AROM.  Her (L) UE strength is overall improved as well.  Though she has not yet met all functional PT goals, patient is motivated to continue with HEP from this point forward for anticipated further gains.          Awaiting MD orders - patient to f/u with MD on 03/08/2023.         Manual Therapy:         mins  89354;  Therapeutic Exercise:    25     mins  56715;     Neuromuscular Ting:    13    mins  50518;    Therapeutic Activity:     10     mins  32384;     Gait Training:           mins  90136;     Ultrasound:          mins  57843;    Electrical Stimulation:         mins  72828 ( );  Dry Needling          mins self-pay    Timed Treatment:   48   mins   Total Treatment:     48   mins    Richa Sheriff PT, DPT, OCS  Physical Therapist  [place] License # [id number]

## 2023-03-08 ENCOUNTER — OFFICE VISIT (OUTPATIENT)
Dept: SPORTS MEDICINE | Facility: CLINIC | Age: 63
End: 2023-03-08
Payer: COMMERCIAL

## 2023-03-08 DIAGNOSIS — M75.42 SUBACROMIAL IMPINGEMENT OF LEFT SHOULDER: ICD-10-CM

## 2023-03-08 DIAGNOSIS — R29.898 LEFT ARM WEAKNESS: ICD-10-CM

## 2023-03-08 DIAGNOSIS — G56.22 ULNAR NEUROPATHY AT ELBOW, LEFT: Primary | ICD-10-CM

## 2023-03-08 PROCEDURE — 99214 OFFICE O/P EST MOD 30 MIN: CPT | Performed by: STUDENT IN AN ORGANIZED HEALTH CARE EDUCATION/TRAINING PROGRAM

## 2023-03-08 RX ORDER — MELOXICAM 7.5 MG/1
7.5 TABLET ORAL DAILY
Qty: 30 TABLET | Refills: 0 | Status: SHIPPED | OUTPATIENT
Start: 2023-03-08

## 2023-03-08 NOTE — PROGRESS NOTES
Chief Complaint   Patient presents with   • Left Elbow - Follow-up       History of Present Illness  Bruna is a 62 y.o. year old RHD female here today for follow-up of left elbow pain that has been present since 11/25/22 after she fell on the stairs and landed on her left side and elbow.  We reviewed images and exam findings.  At her initial exam, swelling and bruising related to the fall and subsequent contusion had resolved, but she continued to have symptoms of ulnar neuropathy.  She also had signs of rotator cuff irritation and subacromial impingement shoulder. Please see previous notes for complete history and treatment course. She returns today reporting improvement in her symptoms. Definitely notices the strength in her hands has improved. She is still having pain of the posterior shoulder that currently seems to be the most bothersome.  Her elbow pain is improved and she only notices pain with direct pressure, otherwise her elbow is pain-free through range of motion and with activity.  She has also been having some pain at the wrist and still gets some tingling in her hand, mainly towards the 3rd-5th.  She feels that the weather contributes to some of the variability in her symptoms.  She has attended 8 PT visits and feels that it has been beneficial.  She also did get an elbow pad which she thinks has helped.  She denies any new injury or complaints.     She has a history of previous shoulder issues and had an injection done in 2005.   Is currently retired, previously worked for the police department.   Has a history of HTN with home SBP averaging 120-140.    Lab Results   Component Value Date    GLUCOSE 109 (H) 08/06/2021    BUN 8 08/06/2021    CREATININE 0.66 08/06/2021    BCR 12.1 08/06/2021    K 4.6 08/06/2021    CO2 28.9 08/06/2021    CALCIUM 9.6 08/06/2021    PROTENTOTREF 7.8 08/06/2021    ALBUMIN 4.80 08/06/2021    BILITOT 0.4 08/06/2021    AST 24 08/06/2021    ALT 19 08/06/2021         Physical  Exam  MSK Exam:  The left elbow and wrist are without obvious signs of acute bony deformity, swelling, erythema or ecchymosis. There is no tenderness to the medial or lateral joint line or epicondyles. Posterior elbow/olecranon with trace tenderness. Soft tissues including the biceps tendon, flexor/pronator mass, common extensor tendon, and UCL are nontender. No focal bony tenderness of the forearm or wrist. Active range of motion at the elbow are full and pain-free. There is no instability with stress testing. Elbow flexion and extension strength are 4+/5.  Strength of the wrist and hand (including , interosseous, and pincer strength) has improved to 4+/5 compared to right. Supination and pronation strength 4/5 with pain at the wrist. Sensation is intact and equal bilaterally.    The left shoulder is without obvious signs of acute bony deformity, swelling, erythema or ecchymosis.  There is persistent but mild tenderness throughout the shoulder. Active range of motion (FF and abd) is full with mild pain at end range. Impingement signs are positive with Neer, Love, and crossover tests. Speeds and Yergason's tests are negative. Tyaskin's and Melody's test are positive for pain and 4-/5 weakness. ER and IR strength in neutral is 4/5.      Assessment and Plan  Diagnoses and all orders for this visit:    1. Ulnar neuropathy at elbow, left (Primary)  -     meloxicam (Mobic) 7.5 MG tablet; Take 1 tablet by mouth Daily.  Dispense: 30 tablet; Refill: 0    2. Subacromial impingement of left shoulder  -     meloxicam (Mobic) 7.5 MG tablet; Take 1 tablet by mouth Daily.  Dispense: 30 tablet; Refill: 0    3. Left arm weakness  -     meloxicam (Mobic) 7.5 MG tablet; Take 1 tablet by mouth Daily.  Dispense: 30 tablet; Refill: 0    Bruna is a 62 y.o. year old RHD female here today for follow-up of left elbow pain that has been present since 11/25/22 after she fell on the stairs and landed on her left side and elbow.  We  reviewed images and exam findings.  At her initial exam, swelling and bruising related to the fall and subsequent contusion had resolved, but she continued to have symptoms of ulnar neuropathy.  She also had signs of rotator cuff irritation and subacromial impingement shoulder. She reports 30-40% improvement since last visit and has shown improvement in strength.  However, her shoulder is now the most bothersome in both her symptoms and also with the greatest weakness on exam.  I recommended continued conservative management. A subacromial cortisone injection was offered but declined at this time.  A prescription was provided for meloxicam to be taken daily for the next 10 to 14 days then as needed.  She was instructed to avoid use of other over-the-counter anti-inflammatories with use of this medication, but may use Tylenol as needed.  I recommended that she continue with physical therapy and compliance with her home exercise program.  She should also continue with other previously discussed conservative measures such as use of an elbow pad and avoiding direct pressure, ice and/or heat as needed, and activity as tolerated. We will follow-up in 6 weeks. All of her questions were answered and she is agreeable with the plan.    Dictated utilizing Dragon dictation.

## 2023-03-17 ENCOUNTER — HOSPITAL ENCOUNTER (OUTPATIENT)
Dept: CARDIOLOGY | Facility: HOSPITAL | Age: 63
Discharge: HOME | End: 2023-03-17
Admitting: INTERNAL MEDICINE
Payer: COMMERCIAL

## 2023-03-17 VITALS — HEART RATE: 76 BPM | DIASTOLIC BLOOD PRESSURE: 87 MMHG | SYSTOLIC BLOOD PRESSURE: 139 MMHG | RESPIRATION RATE: 18 BRPM

## 2023-03-17 PROCEDURE — 93017 CV STRESS TEST TRACING ONLY: CPT

## 2023-03-17 PROCEDURE — 93018 CV STRESS TEST I&R ONLY: CPT | Performed by: INTERNAL MEDICINE

## 2023-03-20 LAB
BH CV STRESS BP STAGE 1: NORMAL
BH CV STRESS BP STAGE 2: NORMAL
BH CV STRESS DURATION MIN STAGE 1: 3
BH CV STRESS DURATION MIN STAGE 2: 1
BH CV STRESS DURATION SEC STAGE 1: 0
BH CV STRESS DURATION SEC STAGE 2: 30
BH CV STRESS GRADE STAGE 1: 10
BH CV STRESS GRADE STAGE 2: 12
BH CV STRESS HR STAGE 1: 130
BH CV STRESS HR STAGE 2: 145
BH CV STRESS METS STAGE 1: 5
BH CV STRESS METS STAGE 2: 6.5
BH CV STRESS PROTOCOL 1: NORMAL
BH CV STRESS RECOVERY BP: NORMAL MMHG
BH CV STRESS RECOVERY HR: 73 BPM
BH CV STRESS SPEED STAGE 1: 1.7
BH CV STRESS SPEED STAGE 2: 2.5
BH CV STRESS STAGE 1: 1
BH CV STRESS STAGE 2: 2
MAXIMAL PREDICTED HEART RATE: 157 BPM
PERCENT MAX PREDICTED HR: 92.36 %
STRESS BASELINE BP: NORMAL MMHG
STRESS BASELINE HR: 76 BPM
STRESS PERCENT HR: 109 %
STRESS POST ESTIMATED WORKLOAD: 6.5 METS
STRESS POST EXERCISE DUR MIN: 4 MIN
STRESS POST EXERCISE DUR SEC: 30 SEC
STRESS POST PEAK BP: NORMAL MMHG
STRESS POST PEAK HR: 145 BPM
STRESS TARGET HR: 133 BPM

## 2023-03-21 ENCOUNTER — HOSPITAL ENCOUNTER (OUTPATIENT)
Dept: CARDIOLOGY | Facility: HOSPITAL | Age: 63
Discharge: HOME | End: 2023-03-21
Admitting: INTERNAL MEDICINE
Payer: COMMERCIAL

## 2023-03-21 VITALS
HEIGHT: 67 IN | BODY MASS INDEX: 32.65 KG/M2 | HEART RATE: 60 BPM | WEIGHT: 208 LBS | SYSTOLIC BLOOD PRESSURE: 143 MMHG | DIASTOLIC BLOOD PRESSURE: 87 MMHG

## 2023-03-21 PROCEDURE — 93306 TTE W/DOPPLER COMPLETE: CPT

## 2023-03-21 PROCEDURE — 93306 TTE W/DOPPLER COMPLETE: CPT | Performed by: INTERNAL MEDICINE

## 2023-03-23 LAB
AORTIC DIMENSIONLESS INDEX: 0.6 (DI)
BH CV ECHO MEAS - ACS: 1.57 CM
BH CV ECHO MEAS - AO MAX PG: 14.8 MMHG
BH CV ECHO MEAS - AO MEAN PG: 7.9 MMHG
BH CV ECHO MEAS - AO ROOT DIAM: 2.23 CM
BH CV ECHO MEAS - AO V2 MAX: 192.4 CM/SEC
BH CV ECHO MEAS - AO V2 VTI: 39.6 CM
BH CV ECHO MEAS - AVA(I,D): 1.89 CM2
BH CV ECHO MEAS - EDV(CUBED): 52.1 ML
BH CV ECHO MEAS - EDV(MOD-SP2): 52 ML
BH CV ECHO MEAS - EDV(MOD-SP4): 49 ML
BH CV ECHO MEAS - EF(MOD-BP): 62.8 %
BH CV ECHO MEAS - EF(MOD-SP2): 63.5 %
BH CV ECHO MEAS - EF(MOD-SP4): 61.2 %
BH CV ECHO MEAS - ESV(CUBED): 13.2 ML
BH CV ECHO MEAS - ESV(MOD-SP2): 19 ML
BH CV ECHO MEAS - ESV(MOD-SP4): 19 ML
BH CV ECHO MEAS - FS: 36.7 %
BH CV ECHO MEAS - IVS/LVPW: 0.78 CM
BH CV ECHO MEAS - IVSD: 1.06 CM
BH CV ECHO MEAS - LA DIMENSION: 3.3 CM
BH CV ECHO MEAS - LAT PEAK E' VEL: 7.4 CM/SEC
BH CV ECHO MEAS - LV DIASTOLIC VOL/BSA (35-75): 23.8 CM2
BH CV ECHO MEAS - LV MASS(C)D: 152.1 GRAMS
BH CV ECHO MEAS - LV MAX PG: 5.2 MMHG
BH CV ECHO MEAS - LV MEAN PG: 2.25 MMHG
BH CV ECHO MEAS - LV SYSTOLIC VOL/BSA (12-30): 9.2 CM2
BH CV ECHO MEAS - LV V1 MAX: 113.5 CM/SEC
BH CV ECHO MEAS - LV V1 VTI: 25.8 CM
BH CV ECHO MEAS - LVIDD: 3.7 CM
BH CV ECHO MEAS - LVIDS: 2.36 CM
BH CV ECHO MEAS - LVOT AREA: 2.9 CM2
BH CV ECHO MEAS - LVOT DIAM: 1.92 CM
BH CV ECHO MEAS - LVPWD: 1.37 CM
BH CV ECHO MEAS - MED PEAK E' VEL: 7.3 CM/SEC
BH CV ECHO MEAS - MR MAX PG: 69 MMHG
BH CV ECHO MEAS - MR MAX VEL: 415.3 CM/SEC
BH CV ECHO MEAS - MV A DUR: 0.13 SEC
BH CV ECHO MEAS - MV A MAX VEL: 115.4 CM/SEC
BH CV ECHO MEAS - MV DEC SLOPE: 444.6 CM/SEC2
BH CV ECHO MEAS - MV DEC TIME: 201 MSEC
BH CV ECHO MEAS - MV E MAX VEL: 82.9 CM/SEC
BH CV ECHO MEAS - MV E/A: 0.72
BH CV ECHO MEAS - MV MAX PG: 9.1 MMHG
BH CV ECHO MEAS - MV MEAN PG: 2.21 MMHG
BH CV ECHO MEAS - MV P1/2T: 74 MSEC
BH CV ECHO MEAS - MV V2 VTI: 39.9 CM
BH CV ECHO MEAS - MVA(P1/2T): 3 CM2
BH CV ECHO MEAS - MVA(VTI): 1.88 CM2
BH CV ECHO MEAS - PA ACC TIME: 0.14 SEC
BH CV ECHO MEAS - PA PR(ACCEL): 16 MMHG
BH CV ECHO MEAS - PA V2 MAX: 123.5 CM/SEC
BH CV ECHO MEAS - PULM A REVS DUR: 0.17 SEC
BH CV ECHO MEAS - PULM A REVS VEL: 29.4 CM/SEC
BH CV ECHO MEAS - PULM DIAS VEL: 39.3 CM/SEC
BH CV ECHO MEAS - PULM S/D: 1.39
BH CV ECHO MEAS - PULM SYS VEL: 54.7 CM/SEC
BH CV ECHO MEAS - QP/QS: 1.39
BH CV ECHO MEAS - RV MAX PG: 3.1 MMHG
BH CV ECHO MEAS - RV V1 MAX: 87.8 CM/SEC
BH CV ECHO MEAS - RV V1 VTI: 21.8 CM
BH CV ECHO MEAS - RVDD: 2.9 CM
BH CV ECHO MEAS - RVOT DIAM: 2.47 CM
BH CV ECHO MEAS - SI(MOD-SP2): 16.1 ML/M2
BH CV ECHO MEAS - SI(MOD-SP4): 14.6 ML/M2
BH CV ECHO MEAS - SV(LVOT): 74.9 ML
BH CV ECHO MEAS - SV(MOD-SP2): 33 ML
BH CV ECHO MEAS - SV(MOD-SP4): 30 ML
BH CV ECHO MEAS - SV(RVOT): 104.4 ML
BH CV ECHO MEAS - TAPSE (>1.6): 3.1 CM
BH CV ECHO MEASUREMENTS AVERAGE E/E' RATIO: 11.28
BH CV XLRA - RV BASE: 3.3 CM
BH CV XLRA - RV LENGTH: 6.8 CM
BH CV XLRA - RV MID: 2.07 CM
BH CV XLRA - TDI S': 18.3 CM/SEC
LEFT ATRIUM VOLUME INDEX: 26.8 ML/M2
MAXIMAL PREDICTED HEART RATE: 157 BPM
SINUS: 2.15 CM
STJ: 2.02 CM
STRESS TARGET HR: 133 BPM

## 2023-03-28 ENCOUNTER — OFFICE VISIT (OUTPATIENT)
Dept: CARDIOLOGY | Facility: CLINIC | Age: 63
End: 2023-03-28
Payer: COMMERCIAL

## 2023-03-28 VITALS
WEIGHT: 206 LBS | DIASTOLIC BLOOD PRESSURE: 74 MMHG | HEIGHT: 67 IN | BODY MASS INDEX: 32.33 KG/M2 | SYSTOLIC BLOOD PRESSURE: 121 MMHG | HEART RATE: 55 BPM

## 2023-03-28 DIAGNOSIS — I10 ESSENTIAL HYPERTENSION: ICD-10-CM

## 2023-03-28 DIAGNOSIS — E78.00 PURE HYPERCHOLESTEROLEMIA: ICD-10-CM

## 2023-03-28 DIAGNOSIS — I31.39 PERICARDIAL EFFUSION: ICD-10-CM

## 2023-03-28 DIAGNOSIS — R01.1 HEART MURMUR: Primary | ICD-10-CM

## 2023-03-28 PROCEDURE — 99214 OFFICE O/P EST MOD 30 MIN: CPT

## 2023-03-28 NOTE — PROGRESS NOTES
Subjective:        Bruna Jacobs is a 62 y.o. female who here for follow up    Chief Complaint   Patient presents with   • Results     Stress test and echo        HPI    Cathleen Jacobs is a 62-year-old female who is new with this provider with a history to include hypertension, hyperlipidemia, GERD.  She follows up in office today for test results and management of murmur.    Echo 3/21/2023 EF 61 to 65%, calcification of the aortic valve, normal LV function, small less than 1 cm pericardial effusion without evidence of cardiac tamponade.  Treadmill stress test 3/17/2023 normal ECG stress test, significant ventricular bigeminy.     Cardiac catheterization 2019 normal coronary arteries, normal LVEDP.    The following portions of the patient's history were reviewed and updated as appropriate: allergies, current medications, past family history, past medical history, past social history, past surgical history and problem list.    Past Medical History:   Diagnosis Date   • GERD (gastroesophageal reflux disease)    • Glaucoma    • Heart murmur    • History of thyroid cyst    • History of transfusion    • Hyperlipidemia    • Hypertension    • Sinus congestion    • Sleep apnea          reports that she has never smoked. She has never used smokeless tobacco. She reports current alcohol use. She reports that she does not use drugs.     Family History   Problem Relation Age of Onset   • Diabetes Mother    • Heart disease Mother    • Hypertension Mother    • Diabetes Father    • Hypertension Father    • Heart failure Father    • Heart disease Father    • Heart attack Neg Hx    • Malig Hyperthermia Neg Hx        ROS     Review of Systems  Constitutional: No wt loss, fever, fatigue  Gastrointestinal: No nausea, abdominal pain  Behavioral/Psych: No insomnia or anxiety  Cardiovascular no chest pain or shortness      Objective:           Vitals and nursing note reviewed.   Constitutional:       Appearance: Well-developed.   HENT:       Head: Normocephalic.      Right Ear: External ear normal.      Left Ear: External ear normal.   Neck:      Vascular: No JVD.   Pulmonary:      Effort: Pulmonary effort is normal. No respiratory distress.      Breath sounds: Normal breath sounds. No stridor. No rales.   Cardiovascular:      Normal rate. Regular rhythm.      No gallop.   Pulses:     Intact distal pulses.   Abdominal:      General: Bowel sounds are normal. There is no distension.      Palpations: Abdomen is soft.      Tenderness: There is no abdominal tenderness. There is no guarding.   Musculoskeletal: Normal range of motion.         General: No tenderness.      Cervical back: Normal range of motion. Skin:     General: Skin is warm.   Neurological:      Mental Status: Alert and oriented to person, place, and time.      Deep Tendon Reflexes: Reflexes are normal and symmetric.   Psychiatric:         Judgment: Judgment normal.         Procedures  Conclusion       • Successful right internal coronary angiogram and LV pressures  • Normal coronary arteries with normal LVEDP     Interpretation Summary       •  No ECG evidence of myocardial ischemia.  •  Negative clinical evidence of myocardial ischemia.  •  Findings consistent with a normal ECG stress test.  •  No ECG evidence of myocardial ischemia. Negative clinical evidence of myocardial ischemia. Findings consistent with a normal ECG stress test. Asymptomatic for chest pain. ECG is negative for ischemia.  •  Poor excercise tolerance  •  Sig V Bigeminy  Interpretation Summary       •  Left ventricular ejection fraction appears to be 61 - 65%.  •  Left ventricular diastolic function was normal.  •  There is calcification of the aortic valve.  •  There is a small (<1cm) pericardial effusion. There is no evidence of cardiac tamponade.           Current Outpatient Medications:   •  Alirocumab (PRALUENT) 150 MG/ML injection pen, Inject  under the skin into the appropriate area as directed Every 14  (Fourteen) Days., Disp: , Rfl:   •  amLODIPine (NORVASC) 5 MG tablet, TAKE ONE TABLET BY MOUTH DAILY, Disp: 30 tablet, Rfl: 3  •  aspirin  MG tablet, Take 1 tablet by mouth Daily., Disp: , Rfl:   •  escitalopram (LEXAPRO) 10 MG tablet, TAKE ONE TABLET BY MOUTH DAILY, Disp: 90 tablet, Rfl: 3  •  famotidine (PEPCID) 20 MG tablet, Take 1 tablet by mouth 2 (Two) Times a Day As Needed., Disp: , Rfl:   •  fexofenadine (Allegra Allergy) 180 MG tablet, Take 1 tablet by mouth Daily. (Patient taking differently: Take 1 tablet by mouth As Needed.), Disp: 30 tablet, Rfl: 2  •  fluticasone (FLONASE) 50 MCG/ACT nasal spray, 2 sprays into the nostril(s) as directed by provider As Needed., Disp: , Rfl:   •  meloxicam (Mobic) 7.5 MG tablet, Take 1 tablet by mouth Daily., Disp: 30 tablet, Rfl: 0  •  olmesartan (BENICAR) 40 MG tablet, TAKE ONE TABLET BY MOUTH DAILY, Disp: 90 tablet, Rfl: 4  •  timolol (TIMOPTIC) 0.5 % ophthalmic solution, Administer 1 drop to both eyes 2 (Two) Times a Day., Disp: , Rfl:   •  VITAMIN D PO, Take  by mouth., Disp: , Rfl:      Assessment:        Patient Active Problem List   Diagnosis   • Essential hypertension   • Fatigue   • Left carotid bruit   • Palpitations   • Hyperlipidemia   • Palpitation   • Precordial pain   • Thyroid nodule   • Heart murmur   • History of adenomatous polyp of colon               Plan:   1.  Heart murmur: Echo as above.  No chest pain or shortness of breath.    2.  Hypertension: 121/74 today in office.  Continue current management.    Importance of controlling hypertension and blood pressure  checkup on the regular basis has been explained. Hypertension as a silent killer has been discussed. Risk reduction of the weight and regular exercises to control the hypertension has been explained.    3.  Hyperlipidemia: Her PCP manages her cholesterol labs.  Continue current management.    Risk of the hyperlipidemia, importance of the treatment has been explained. Pros and cons of  the statins has been explained. Regular blood workup as well as side effects including the liver failure, myelopathy death has been explained.    4.  Very small pericardial effusion: I will recheck a limited echo in 1 month to monitor pericardial effusion.              No diagnosis found.    There are no diagnoses linked to this encounter.    COUNSELING: citlalli Price was given to patient for the following topics: diagnostic results, risk factor reductions, impressions, risks and benefits of treatment options and importance of treatment compliance .       SMOKING COUNSELING: denies    1 month with limited echo for pericardial effusion.   Follow up in 6 months or sooner if needed    Sincerely,   JERONIMO Gordon  [facility]  03/28/23  11:34 EDT    EMR Dragon/Transcription disclaimer:   Much of this encounter note is an electronic transcription/translation of spoken language to printed text. The electronic translation of spoken language may permit erroneous, or at times, nonsensical words or phrases to be inadvertently transcribed; Although I have reviewed the note for such errors, some may still exist.

## 2023-04-19 ENCOUNTER — OFFICE VISIT (OUTPATIENT)
Dept: SPORTS MEDICINE | Facility: CLINIC | Age: 63
End: 2023-04-19
Payer: COMMERCIAL

## 2023-04-19 VITALS — HEIGHT: 67 IN | WEIGHT: 206 LBS | BODY MASS INDEX: 32.33 KG/M2 | TEMPERATURE: 97.8 F

## 2023-04-19 DIAGNOSIS — M75.42 SUBACROMIAL IMPINGEMENT OF LEFT SHOULDER: ICD-10-CM

## 2023-04-19 DIAGNOSIS — G56.22 ULNAR NEUROPATHY AT ELBOW, LEFT: Primary | ICD-10-CM

## 2023-04-19 PROCEDURE — 99213 OFFICE O/P EST LOW 20 MIN: CPT | Performed by: STUDENT IN AN ORGANIZED HEALTH CARE EDUCATION/TRAINING PROGRAM
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
## 2023-04-26 ENCOUNTER — HOSPITAL ENCOUNTER (OUTPATIENT)
Dept: CARDIOLOGY | Facility: HOSPITAL | Age: 63
Discharge: HOME | End: 2023-04-26
Payer: COMMERCIAL

## 2023-04-26 VITALS
WEIGHT: 200 LBS | HEIGHT: 67 IN | SYSTOLIC BLOOD PRESSURE: 160 MMHG | HEART RATE: 52 BPM | BODY MASS INDEX: 31.39 KG/M2 | DIASTOLIC BLOOD PRESSURE: 88 MMHG

## 2023-04-26 DIAGNOSIS — R01.1 HEART MURMUR: ICD-10-CM

## 2023-04-26 DIAGNOSIS — I31.39 PERICARDIAL EFFUSION: ICD-10-CM

## 2023-04-26 LAB
MAXIMAL PREDICTED HEART RATE: 157 BPM
STRESS TARGET HR: 133 BPM

## 2023-04-26 PROCEDURE — 93308 TTE F-UP OR LMTD: CPT | Performed by: INTERNAL MEDICINE

## 2023-04-26 PROCEDURE — 93321 DOPPLER ECHO F-UP/LMTD STD: CPT | Performed by: INTERNAL MEDICINE

## 2023-04-26 PROCEDURE — 93308 TTE F-UP OR LMTD: CPT

## 2023-04-26 PROCEDURE — 93321 DOPPLER ECHO F-UP/LMTD STD: CPT

## 2023-04-26 PROCEDURE — 93325 DOPPLER ECHO COLOR FLOW MAPG: CPT | Performed by: INTERNAL MEDICINE

## 2023-04-26 PROCEDURE — 93325 DOPPLER ECHO COLOR FLOW MAPG: CPT

## 2023-05-08 ENCOUNTER — OFFICE VISIT (OUTPATIENT)
Dept: FAMILY MEDICINE CLINIC | Facility: CLINIC | Age: 63
End: 2023-05-08
Payer: COMMERCIAL

## 2023-05-08 VITALS
RESPIRATION RATE: 18 BRPM | HEART RATE: 63 BPM | DIASTOLIC BLOOD PRESSURE: 84 MMHG | SYSTOLIC BLOOD PRESSURE: 132 MMHG | BODY MASS INDEX: 32.43 KG/M2 | TEMPERATURE: 98.6 F | OXYGEN SATURATION: 98 % | HEIGHT: 67 IN | WEIGHT: 206.6 LBS

## 2023-05-08 DIAGNOSIS — J01.00 SUBACUTE MAXILLARY SINUSITIS: ICD-10-CM

## 2023-05-08 DIAGNOSIS — G44.83 COUGH HEADACHE: Primary | ICD-10-CM

## 2023-05-08 PROCEDURE — 99214 OFFICE O/P EST MOD 30 MIN: CPT | Performed by: INTERNAL MEDICINE

## 2023-05-08 RX ORDER — FLUTICASONE PROPIONATE 50 MCG
2 SPRAY, SUSPENSION (ML) NASAL DAILY
Qty: 11 ML | Refills: 1 | Status: SHIPPED | OUTPATIENT
Start: 2023-05-08

## 2023-05-08 RX ORDER — AZITHROMYCIN 250 MG/1
TABLET, FILM COATED ORAL
Qty: 6 TABLET | Refills: 0 | Status: SHIPPED | OUTPATIENT
Start: 2023-05-08

## 2023-05-08 NOTE — PROGRESS NOTES
05/08/2023    Assessment & Plan    This 62-year-old patient relates that over the past 3 weeks she has been bothered by increasing facial discomfort earache sneezing and runny nose.  She has been seen in the past by allergist and she relates that they wanted to start allergy injections but needed her to stop her blood pressure medicines??  She does not recall who the allergist was noted we have a record of her having been seen by anyone in the last 6 months that she relates.  She will call us to give us the name of the doctor so we can contact them.    Patient has bulging TMs.  She has facial discomfort.  Note is made she is currently using Allegra-D daily.      Diagnoses and all orders for this visit:    1. Cough headache (Primary)  -     Respiratory Panel PCR w/COVID-19(SARS-CoV-2) [facility], NP Swab in UTM/VTM, 3-4 HR TAT - Swab, Nasopharynx; Future    2. Subacute maxillary sinusitis      Plan:  1.)  Continue Allegra-D daily.  2.)  Flonase 2 squirts each nostril every morning.  3.)  Zithromax 250 mg 2 tablets now then 1 tab p.o. daily  4.)  Schedule for physical examination and reevaluation.  5.)  The patient will call me in the name of the allergist that she saw within the past few months so we can obtain those records.       CC: Cough (Runny nose,earache,sneezing, headache,swelling in hands and feet )  .        HPI  History of Present Illness     Subjective   Bruna Jacobs is a 62 y.o. female.      The following portions of the patient's history were reviewed and updated as appropriate: allergies, current medications, past family history, past medical history, past social history, past surgical history and problem list.    Problem List  Patient Active Problem List   Diagnosis   • Essential hypertension   • Fatigue   • Left carotid bruit   • Palpitations   • Hyperlipidemia   • Palpitation   • Precordial pain   • Thyroid nodule   • Heart murmur   • History of adenomatous polyp of  colon       Past Medical History  Past Medical History:   Diagnosis Date   • GERD (gastroesophageal reflux disease)    • Glaucoma    • Heart murmur    • History of thyroid cyst    • History of transfusion    • Hyperlipidemia    • Hypertension    • Sinus congestion    • Sleep apnea        Surgical History  Past Surgical History:   Procedure Laterality Date   • CARDIAC CATHETERIZATION N/A 03/15/2019    Procedure: Left Heart Cath;  Surgeon: Graciela Boland MD;  Location:  [facility];  Service: Cardiology   • CARDIAC CATHETERIZATION N/A 03/15/2019    Procedure: Coronary angiography;  Surgeon: Graciela Boland MD;  Location:  [facility];  Service: Cardiology   • COLONOSCOPY     • COLONOSCOPY N/A 10/13/2022    Procedure: COLONOSCOPY TO CECUM;  Surgeon: Yves Berg MD;  Location:  [facility];  Service: General;  Laterality: N/A;  HX OF COLON POLYPS  POST: DIVERTICULOSIS   • HYSTERECTOMY     • THYROIDECTOMY, PARTIAL         Family History  Family History   Problem Relation Age of Onset   • Diabetes Mother    • Heart disease Mother    • Hypertension Mother    • Diabetes Father    • Hypertension Father    • Heart failure Father    • Heart disease Father    • Heart attack Neg Hx    • Malig Hyperthermia Neg Hx        Social History  Social History    Tobacco Use      Smoking status: Never      Smokeless tobacco: Never       Is the Patient a current tobacco user? No    Allergies  Allergies   Allergen Reactions   • Atorvastatin Myalgia   • Niacin Unknown - Low Severity     CANNOT REMEMBER   • Rosuvastatin Myalgia   • Statins Myalgia       Current Medications    Current Outpatient Medications:   •  Alirocumab (PRALUENT) 150 MG/ML injection pen, Inject  under the skin into the appropriate area as directed Every 14 (Fourteen) Days., Disp: , Rfl:   •  amLODIPine (NORVASC) 5 MG tablet, TAKE ONE TABLET BY MOUTH DAILY, Disp: 30 tablet, Rfl: 3  •  aspirin  MG tablet, Take  1 tablet by mouth Daily., Disp: , Rfl:   •  escitalopram (LEXAPRO) 10 MG tablet, TAKE ONE TABLET BY MOUTH DAILY, Disp: 90 tablet, Rfl: 3  •  fexofenadine (Allegra Allergy) 180 MG tablet, Take 1 tablet by mouth Daily. (Patient taking differently: Take 1 tablet by mouth As Needed.), Disp: 30 tablet, Rfl: 2  •  timolol (TIMOPTIC) 0.5 % ophthalmic solution, Administer 1 drop to both eyes 2 (Two) Times a Day., Disp: , Rfl:   •  VITAMIN D PO, Take  by mouth., Disp: , Rfl:   •  famotidine (PEPCID) 20 MG tablet, Take 1 tablet by mouth 2 (Two) Times a Day As Needed. (Patient not taking: Reported on 5/8/2023), Disp: , Rfl:   •  fluticasone (FLONASE) 50 MCG/ACT nasal spray, 2 sprays into the nostril(s) as directed by provider As Needed. (Patient not taking: Reported on 5/8/2023), Disp: , Rfl:   •  meloxicam (Mobic) 7.5 MG tablet, Take 1 tablet by mouth Daily. (Patient not taking: Reported on 5/8/2023), Disp: 30 tablet, Rfl: 0  •  olmesartan (BENICAR) 40 MG tablet, TAKE ONE TABLET BY MOUTH DAILY (Patient not taking: Reported on 5/8/2023), Disp: 90 tablet, Rfl: 4     Review of System  Review of Systems  I have reviewed and confirmed the accuracy of the ROS as documented by the MA/LPN/RN Edward Hawk MD    Vitals:    05/08/23 0942   BP: 132/84   Pulse: 63   Resp: 18   Temp: 98.6 °F (37 °C)   SpO2: 98%     Body mass index is 32.35 kg/m².    Objective     Physical Exam  Physical Exam        Edward Hawk MD  05/08/2023

## 2023-05-22 RX ORDER — FEXOFENADINE HCL 180 MG/1
TABLET ORAL
Qty: 30 TABLET | Refills: 2 | Status: SHIPPED | OUTPATIENT
Start: 2023-05-22

## 2023-05-23 ENCOUNTER — OFFICE VISIT (OUTPATIENT)
Dept: FAMILY MEDICINE CLINIC | Facility: CLINIC | Age: 63
End: 2023-05-23
Payer: COMMERCIAL

## 2023-05-23 VITALS
BODY MASS INDEX: 32.49 KG/M2 | OXYGEN SATURATION: 97 % | HEIGHT: 67 IN | WEIGHT: 207 LBS | HEART RATE: 57 BPM | SYSTOLIC BLOOD PRESSURE: 130 MMHG | DIASTOLIC BLOOD PRESSURE: 88 MMHG

## 2023-05-23 DIAGNOSIS — I10 PRIMARY HYPERTENSION: ICD-10-CM

## 2023-05-23 DIAGNOSIS — Z92.89: Primary | ICD-10-CM

## 2023-05-23 DIAGNOSIS — R35.1 NOCTURIA: ICD-10-CM

## 2023-05-23 DIAGNOSIS — Z13.0 SCREENING FOR DEFICIENCY ANEMIA: ICD-10-CM

## 2023-05-23 DIAGNOSIS — Z13.220 SCREENING FOR HYPERLIPIDEMIA: ICD-10-CM

## 2023-05-23 DIAGNOSIS — R05.8 NONPRODUCTIVE COUGH: ICD-10-CM

## 2023-05-23 DIAGNOSIS — Z91.09 ENVIRONMENTAL ALLERGIES: ICD-10-CM

## 2023-05-23 RX ORDER — SPIRONOLACTONE 25 MG/1
TABLET ORAL
Qty: 30 TABLET | Refills: 3 | Status: SHIPPED | OUTPATIENT
Start: 2023-05-23

## 2023-05-23 NOTE — PROGRESS NOTES
05/23/2023    Assessment & Plan    This 62-year-old patient presents today for physical examination.  She relates that she is continue to have a nonproductive cough for the past several weeks.    Regarding anticipatory guidance: We discussed the importance of keeping her LDL cholesterol less than 100.  We reviewed her cholesterol with her over the past several years and saw that over the past 5 years only 1 year has been marked by cholesterol/LDL greater than 100.  We gave her a low-cholesterol diet sheet to be instituted in case her levels become significantly increased.  She understands that she will start this with my direction.    She was placed several months ago on amlodipine 10 mg for blood pressure control but this was reduced to 5 mg by her cardiologist most the patient began to complain of fluid retention.  Her lisinopril was DC'd several weeks ago and replaced by losartan but her nonproductive cough is continued.  She has been on a number of antihypertensive agents in the past including HCTZ which was discontinued secondary to cramping.  She is getting allergy injections and would like to avoid any beta-blockers.  But I feel at this point her nonproductive coughing is secondary to her losartan.  We will DC it at this time and start spironolactone 12.5 mg 1 tab p.o. twice daily.  We will see her back for follow-up in the next week.          There are no diagnoses linked to this encounter.         CC: Annual Exam (No other issues)  .        HPI  History of Present Illness  This 62-year-old patient presents today for physical examination.  She relates has had a continued nonproductive cough for several weeks.  She has had azithromycin started, a change from an ACE to an ARB, with continuation of her nonproductive cough.       Subjective   Bruna Jacobs is a 62 y.o. female.      The following portions of the patient's history were reviewed and updated as appropriate: allergies, current medications, past  family history, past medical history, past social history, past surgical history and problem list.    Problem List  Patient Active Problem List   Diagnosis   • Essential hypertension   • Fatigue   • Left carotid bruit   • Palpitations   • Hyperlipidemia   • Palpitation   • Precordial pain   • Thyroid nodule   • Heart murmur   • History of adenomatous polyp of colon       Past Medical History  Past Medical History:   Diagnosis Date   • GERD (gastroesophageal reflux disease)    • Glaucoma    • Heart murmur    • History of thyroid cyst    • History of transfusion    • Hyperlipidemia    • Hypertension    • Sinus congestion    • Sleep apnea        Surgical History  Past Surgical History:   Procedure Laterality Date   • CARDIAC CATHETERIZATION N/A 03/15/2019    Procedure: Left Heart Cath;  Surgeon: Graciela Boland MD;  Location:  [facility];  Service: Cardiology   • CARDIAC CATHETERIZATION N/A 03/15/2019    Procedure: Coronary angiography;  Surgeon: Graciela Boland MD;  Location:  [facility];  Service: Cardiology   • COLONOSCOPY     • COLONOSCOPY N/A 10/13/2022    Procedure: COLONOSCOPY TO CECUM;  Surgeon: Yves Berg MD;  Location:  [facility];  Service: General;  Laterality: N/A;  HX OF COLON POLYPS  POST: DIVERTICULOSIS   • HYSTERECTOMY     • THYROIDECTOMY, PARTIAL         Family History  Family History   Problem Relation Age of Onset   • Diabetes Mother    • Heart disease Mother    • Hypertension Mother    • Diabetes Father    • Hypertension Father    • Heart failure Father    • Heart disease Father    • Heart attack Neg Hx    • Malig Hyperthermia Neg Hx        Social History  Social History    Tobacco Use      Smoking status: Never      Smokeless tobacco: Never       Is the Patient a current tobacco user? No    Allergies  Allergies   Allergen Reactions   • Atorvastatin Myalgia   • Niacin Unknown - Low Severity     CANNOT REMEMBER   • Rosuvastatin Myalgia    • Statins Myalgia       Current Medications    Current Outpatient Medications:   •  Alirocumab (PRALUENT) 150 MG/ML injection pen, Inject  under the skin into the appropriate area as directed Every 14 (Fourteen) Days., Disp: , Rfl:   •  amLODIPine (NORVASC) 5 MG tablet, TAKE ONE TABLET BY MOUTH DAILY, Disp: 30 tablet, Rfl: 3  •  aspirin  MG tablet, Take 1 tablet by mouth Daily., Disp: , Rfl:   •  azithromycin (Zithromax Z-Reynaldo) 250 MG tablet, Take 2 tablets by mouth on day 1, then 1 tablet daily on days 2-5, Disp: 6 tablet, Rfl: 0  •  escitalopram (LEXAPRO) 10 MG tablet, TAKE ONE TABLET BY MOUTH DAILY, Disp: 90 tablet, Rfl: 3  •  fexofenadine (ALLEGRA) 180 MG tablet, TAKE ONE TABLET BY MOUTH DAILY, Disp: 30 tablet, Rfl: 2  •  fluticasone (FLONASE) 50 MCG/ACT nasal spray, 2 sprays into the nostril(s) as directed by provider Daily., Disp: 11 mL, Rfl: 1  •  meloxicam (Mobic) 7.5 MG tablet, Take 1 tablet by mouth Daily., Disp: 30 tablet, Rfl: 0  •  olmesartan (BENICAR) 40 MG tablet, TAKE ONE TABLET BY MOUTH DAILY, Disp: 90 tablet, Rfl: 4  •  timolol (TIMOPTIC) 0.5 % ophthalmic solution, Administer 1 drop to both eyes 2 (Two) Times a Day., Disp: , Rfl:   •  VITAMIN D PO, Take  by mouth., Disp: , Rfl:   •  famotidine (PEPCID) 20 MG tablet, Take 1 tablet by mouth 2 (Two) Times a Day As Needed. (Patient not taking: Reported on 5/8/2023), Disp: , Rfl:   •  fluticasone (FLONASE) 50 MCG/ACT nasal spray, 2 sprays into the nostril(s) as directed by provider As Needed., Disp: , Rfl:      Review of System  Review of Systems   Constitutional: Negative for chills and fever.   Respiratory: Negative for cough and shortness of breath.    Cardiovascular: Negative for chest pain and palpitations.   Gastrointestinal: Negative for constipation, diarrhea, nausea and vomiting.   Neurological: Negative for dizziness and headache.     I have reviewed and confirmed the accuracy of the ROS as documented by the MA/LPN/RN Edward Hawk,  MD    Vitals:    05/23/23 0726   BP: 130/88   Pulse: 57   SpO2: 97%     Body mass index is 32.42 kg/m².    Objective     Physical Exam  Physical Exam  Constitutional:       General: She is not in acute distress.     Appearance: Normal appearance.   HENT:      Head: Normocephalic and atraumatic.   Cardiovascular:      Rate and Rhythm: Normal rate and regular rhythm.   Pulmonary:      Effort: Pulmonary effort is normal. No respiratory distress.      Breath sounds: Normal breath sounds. No wheezing, rhonchi or rales.   Neurological:      General: No focal deficit present.      Mental Status: She is alert and oriented to person, place, and time.   Psychiatric:         Mood and Affect: Mood normal.         Behavior: Behavior normal.         Thought Content: Thought content normal.         Judgment: Judgment normal.             Edward Hawk MD  05/23/2023

## 2023-05-24 LAB
ALBUMIN SERPL-MCNC: 4.7 G/DL (ref 3.5–5.2)
ALBUMIN/GLOB SERPL: 2 G/DL
ALP SERPL-CCNC: 81 U/L (ref 39–117)
ALT SERPL-CCNC: 10 U/L (ref 1–33)
AST SERPL-CCNC: 17 U/L (ref 1–32)
BASOPHILS # BLD AUTO: NORMAL 10*3/UL
BILIRUB SERPL-MCNC: 0.3 MG/DL (ref 0–1.2)
BUN SERPL-MCNC: 7 MG/DL (ref 8–23)
BUN/CREAT SERPL: 10 (ref 7–25)
CALCIUM SERPL-MCNC: 9.5 MG/DL (ref 8.6–10.5)
CHLORIDE SERPL-SCNC: 104 MMOL/L (ref 98–107)
CHOLEST SERPL-MCNC: 165 MG/DL (ref 0–200)
CHOLEST/HDLC SERPL: 2.89 {RATIO}
CO2 SERPL-SCNC: 27 MMOL/L (ref 22–29)
CREAT SERPL-MCNC: 0.7 MG/DL (ref 0.57–1)
DIFFERENTIAL COMMENT: ABNORMAL
EGFRCR SERPLBLD CKD-EPI 2021: 97.3 ML/MIN/1.73
EOSINOPHIL # BLD AUTO: NORMAL 10*3/UL
EOSINOPHIL NFR BLD AUTO: NORMAL %
ERYTHROCYTE [DISTWIDTH] IN BLOOD BY AUTOMATED COUNT: 14.9 % (ref 12.3–15.4)
GLOBULIN SER CALC-MCNC: 2.3 GM/DL
GLUCOSE SERPL-MCNC: 109 MG/DL (ref 65–99)
HCT VFR BLD AUTO: 37.8 % (ref 34–46.6)
HDLC SERPL-MCNC: 57 MG/DL (ref 40–60)
HGB BLD-MCNC: 12.5 G/DL (ref 12–15.9)
LDLC SERPL CALC-MCNC: 92 MG/DL (ref 0–100)
LYMPHOCYTES # BLD AUTO: NORMAL 10*3/UL
LYMPHOCYTES # BLD MANUAL: 2.69 10*3/MM3 (ref 0.7–3.1)
LYMPHOCYTES NFR BLD AUTO: NORMAL %
LYMPHOCYTES NFR BLD MANUAL: 65.7 % (ref 19.6–45.3)
MCH RBC QN AUTO: 29.9 PG (ref 26.6–33)
MCHC RBC AUTO-ENTMCNC: 33.1 G/DL (ref 31.5–35.7)
MCV RBC AUTO: 90.4 FL (ref 79–97)
MONOCYTES # BLD MANUAL: 0.08 10*3/MM3 (ref 0.1–0.9)
MONOCYTES NFR BLD AUTO: NORMAL %
MONOCYTES NFR BLD MANUAL: 2 % (ref 5–12)
NEUTROPHILS # BLD MANUAL: 1.32 10*3/MM3 (ref 1.7–7)
NEUTROPHILS NFR BLD AUTO: NORMAL %
NEUTROPHILS NFR BLD MANUAL: 32.3 % (ref 42.7–76)
PLATELET # BLD AUTO: 262 10*3/MM3 (ref 140–450)
PLATELET BLD QL SMEAR: ABNORMAL
POTASSIUM SERPL-SCNC: 4.6 MMOL/L (ref 3.5–5.2)
PROT SERPL-MCNC: 7 G/DL (ref 6–8.5)
RBC # BLD AUTO: 4.18 10*6/MM3 (ref 3.77–5.28)
RBC MORPH BLD: ABNORMAL
SODIUM SERPL-SCNC: 141 MMOL/L (ref 136–145)
TRIGL SERPL-MCNC: 84 MG/DL (ref 0–150)
VLDLC SERPL CALC-MCNC: 16 MG/DL (ref 5–40)
WBC # BLD AUTO: 4.09 10*3/MM3 (ref 3.4–10.8)

## 2023-05-25 DIAGNOSIS — R05.3 CHRONIC COUGH: Primary | ICD-10-CM

## 2023-05-26 NOTE — PROGRESS NOTES
Recent blood test results look normal.  Have patient go by [facility] operated by [facility] for chest x-ray.

## 2023-06-02 ENCOUNTER — HOSPITAL ENCOUNTER (OUTPATIENT)
Dept: GENERAL RADIOLOGY | Facility: HOSPITAL | Age: 63
Discharge: HOME | End: 2023-06-02

## 2023-06-02 DIAGNOSIS — R05.3 CHRONIC COUGH: ICD-10-CM

## 2023-06-02 PROCEDURE — 71046 X-RAY EXAM CHEST 2 VIEWS: CPT

## 2023-06-08 ENCOUNTER — OFFICE VISIT (OUTPATIENT)
Dept: FAMILY MEDICINE CLINIC | Facility: CLINIC | Age: 63
End: 2023-06-08
Payer: COMMERCIAL

## 2023-06-08 VITALS
BODY MASS INDEX: 31.86 KG/M2 | WEIGHT: 203 LBS | HEART RATE: 57 BPM | DIASTOLIC BLOOD PRESSURE: 82 MMHG | HEIGHT: 67 IN | OXYGEN SATURATION: 97 % | SYSTOLIC BLOOD PRESSURE: 134 MMHG

## 2023-06-08 DIAGNOSIS — R05.3 CHRONIC COUGH: Primary | ICD-10-CM

## 2023-06-08 DIAGNOSIS — I10 PRIMARY HYPERTENSION: ICD-10-CM

## 2023-06-08 DIAGNOSIS — J45.20 MILD INTERMITTENT ASTHMA WITHOUT COMPLICATION: ICD-10-CM

## 2023-06-08 PROCEDURE — 99214 OFFICE O/P EST MOD 30 MIN: CPT | Performed by: INTERNAL MEDICINE

## 2023-06-08 NOTE — PROGRESS NOTES
06/08/2023    Assessment & Plan         Ms. Jacobs is a 62-year-old presents at this time for follow-up of hypertension.  With her last visit we discontinued her losartan thinking it might be causing a nonproductive cough.  The patient also has been seen by an allergist who the patient relates was told that she needed to come off one of her blood pressure medicines before for the test could be done.  Unfortunately the patient cannot remember the name of the allergist no other location.  We have asked her to do what she could to locate the name of the allergist so that we can contact her and get more information about what medication they felt may have been causing the problem.  In any case we discontinued her from her losartan and changed her to Aldactone 12.5 mg 1 tab p.o. twice daily.  We used such a low dose because the patient is has a history of sensitivity this to medications etc.  She relates that she has not had any unusual problems.  Her blood pressure is today 138/80 in the left arm sitting position standard cuff.    The patient will try to find the name of the allergist so that we can communicate with them regarding blood pressure medication and testing.  Today I have no notes from allergy regarding consultation recently.    We will increase the patient's spironolactone to 25 mg twice daily.  We will see her again in follow-up in about 3 weeks for reevaluation.        Diagnoses and all orders for this visit:    1. Chronic cough (Primary)    2. Primary hypertension    3. Mild intermittent asthma without complication      Plan:  1.)  Increase spironolactone to 25 mg 1 tab p.o. twice daily  2.)  We will contact the allergist when she gets the name of the consultant that she recently saw.  3.)  Follow-up in 3 to 4 weeks for reevaluation.  Continue other antihypertensives.    Part of this note may be an electronic transcription/translation of spoken language to printed text using the Dragon Diictation System.   Some errors may exist even though the document was edited.       CC: Hypertension (F/U---overdue lab order) and Ear Fullness (Bilat----no other issues)  .        HPI  History of Present Illness     Subjective   Bruna Jacobs is a 62 y.o. female.      The following portions of the patient's history were reviewed and updated as appropriate: allergies, current medications, past family history, past medical history, past social history, past surgical history, and problem list.    Problem List  Patient Active Problem List   Diagnosis    Essential hypertension    Fatigue    Left carotid bruit    Palpitations    Hyperlipidemia    Palpitation    Precordial pain    Thyroid nodule    Heart murmur    History of adenomatous polyp of colon       Past Medical History  Past Medical History:   Diagnosis Date    GERD (gastroesophageal reflux disease)     Glaucoma     Heart murmur     History of thyroid cyst     History of transfusion     Hyperlipidemia     Hypertension     Sinus congestion     Sleep apnea        Surgical History  Past Surgical History:   Procedure Laterality Date    CARDIAC CATHETERIZATION N/A 03/15/2019    Procedure: Left Heart Cath;  Surgeon: Graciela Boland MD;  Location: [facility] CATH INVASIVE LOCATION;  Service: Cardiology    CARDIAC CATHETERIZATION N/A 03/15/2019    Procedure: Coronary angiography;  Surgeon: Graciela Boland MD;  [facility];  Service: Cardiology    COLONOSCOPY      COLONOSCOPY N/A 10/13/2022    Procedure: COLONOSCOPY TO CECUM;  Surgeon: Yves Berg MD;  Location: [facility] ENDOSCOPY;  Service: General;  Laterality: N/A;  HX OF COLON POLYPS  POST: DIVERTICULOSIS    HYSTERECTOMY      THYROIDECTOMY, PARTIAL         Family History  Family History   Problem Relation Age of Onset    Diabetes Mother     Heart disease Mother     Hypertension Mother     Diabetes Father     Hypertension Father     Heart failure Father     Heart disease Father     Heart  attack Neg Hx     Malig Hyperthermia Neg Hx        Social History  Social History    Tobacco Use      Smoking status: Never      Smokeless tobacco: Never       Is the Patient a current tobacco user? No    Allergies  Allergies   Allergen Reactions    Atorvastatin Myalgia    Niacin Unknown - Low Severity     CANNOT REMEMBER    Rosuvastatin Myalgia    Statins Myalgia       Current Medications    Current Outpatient Medications:     Alirocumab (PRALUENT) 150 MG/ML injection pen, Inject  under the skin into the appropriate area as directed Every 14 (Fourteen) Days., Disp: , Rfl:     amLODIPine (NORVASC) 5 MG tablet, TAKE ONE TABLET BY MOUTH DAILY, Disp: 30 tablet, Rfl: 3    aspirin  MG tablet, Take 1 tablet by mouth Daily., Disp: , Rfl:     escitalopram (LEXAPRO) 10 MG tablet, TAKE ONE TABLET BY MOUTH DAILY, Disp: 90 tablet, Rfl: 3    fexofenadine (ALLEGRA) 180 MG tablet, TAKE ONE TABLET BY MOUTH DAILY, Disp: 30 tablet, Rfl: 2    fluticasone (FLONASE) 50 MCG/ACT nasal spray, 2 sprays into the nostril(s) as directed by provider Daily., Disp: 11 mL, Rfl: 1    meloxicam (Mobic) 7.5 MG tablet, Take 1 tablet by mouth Daily., Disp: 30 tablet, Rfl: 0    spironolactone (Aldactone) 25 MG tablet, Take 1/2 tablet p.o. twice daily, Disp: 30 tablet, Rfl: 3    timolol (TIMOPTIC) 0.5 % ophthalmic solution, Administer 1 drop to both eyes 2 (Two) Times a Day., Disp: , Rfl:     VITAMIN D PO, Take  by mouth., Disp: , Rfl:     azithromycin (Zithromax Z-Reynaldo) 250 MG tablet, Take 2 tablets by mouth on day 1, then 1 tablet daily on days 2-5 (Patient not taking: Reported on 6/8/2023), Disp: 6 tablet, Rfl: 0    famotidine (PEPCID) 20 MG tablet, Take 1 tablet by mouth 2 (Two) Times a Day As Needed. (Patient not taking: Reported on 5/8/2023), Disp: , Rfl:     fluticasone (FLONASE) 50 MCG/ACT nasal spray, 2 sprays into the nostril(s) as directed by provider As Needed., Disp: , Rfl:     olmesartan (BENICAR) 40 MG tablet, TAKE ONE TABLET BY MOUTH  DAILY (Patient not taking: Reported on 6/8/2023), Disp: 90 tablet, Rfl: 4     Review of System  Review of Systems  I have reviewed and confirmed the accuracy of the ROS as documented by the MA/LPN/RN Edward Hawk MD    Vitals:    06/08/23 1135   BP: 134/82   Pulse: 57   SpO2: 97%     Body mass index is 31.79 kg/m².    Objective     Physical Exam  Physical Exam        Edward Hawk MD  06/08/2023

## 2023-06-19 RX ORDER — AMLODIPINE BESYLATE 5 MG/1
TABLET ORAL
Qty: 30 TABLET | Refills: 5 | Status: SHIPPED | OUTPATIENT
Start: 2023-06-19

## 2023-07-24 ENCOUNTER — TELEPHONE (OUTPATIENT)
Dept: FAMILY MEDICINE CLINIC | Facility: CLINIC | Age: 63
End: 2023-07-24

## 2023-07-24 NOTE — TELEPHONE ENCOUNTER
Caller: Bruna Jacobs    Relationship: Self    Best call back number:     What was the call regarding: PATIENT STATES SHE IS SUPPOSED TO SEE THE KIDNEY SPECIALIST ON 7/26/23. PATIENT DOES NOT HAVE THE OFFICE INFORMATION FOR THEM. PATIENT WOULD LIKE FOR SOMEONE FROM DR DE LA VEGA'S OFFICE TO GIVE HER A CALL WITH THE INFORMATION.    PLEASE CALL AND ADVISE

## 2023-07-25 NOTE — TELEPHONE ENCOUNTER
Hub please inform patient    LM giving patient [facility] number [phone] to call for location.

## 2023-07-28 ENCOUNTER — TRANSCRIBE ORDERS (OUTPATIENT)
Dept: ADMINISTRATIVE | Facility: HOSPITAL | Age: 63
End: 2023-07-28
Payer: COMMERCIAL

## 2023-07-28 DIAGNOSIS — N18.30 STAGE 3 CHRONIC KIDNEY DISEASE, UNSPECIFIED WHETHER STAGE 3A OR 3B CKD: Primary | ICD-10-CM

## 2023-08-18 ENCOUNTER — HOSPITAL ENCOUNTER (OUTPATIENT)
Dept: ULTRASOUND IMAGING | Facility: HOSPITAL | Age: 63
Discharge: HOME | End: 2023-08-18
Admitting: INTERNAL MEDICINE
Payer: COMMERCIAL

## 2023-08-18 DIAGNOSIS — N18.30 STAGE 3 CHRONIC KIDNEY DISEASE, UNSPECIFIED WHETHER STAGE 3A OR 3B CKD: ICD-10-CM

## 2023-08-18 PROCEDURE — 76775 US EXAM ABDO BACK WALL LIM: CPT

## 2023-08-29 ENCOUNTER — HOSPITAL ENCOUNTER (OUTPATIENT)
Dept: GENERAL RADIOLOGY | Facility: HOSPITAL | Age: 63
Discharge: HOME | End: 2023-08-29
Admitting: INTERNAL MEDICINE
Payer: COMMERCIAL

## 2023-08-29 ENCOUNTER — OFFICE VISIT (OUTPATIENT)
Dept: FAMILY MEDICINE CLINIC | Facility: CLINIC | Age: 63
End: 2023-08-29
Payer: COMMERCIAL

## 2023-08-29 VITALS
BODY MASS INDEX: 32.18 KG/M2 | WEIGHT: 205 LBS | SYSTOLIC BLOOD PRESSURE: 120 MMHG | HEIGHT: 67 IN | DIASTOLIC BLOOD PRESSURE: 84 MMHG

## 2023-08-29 DIAGNOSIS — F41.1 ANXIETY STATE: ICD-10-CM

## 2023-08-29 DIAGNOSIS — M25.561 ACUTE PAIN OF RIGHT KNEE: Primary | ICD-10-CM

## 2023-08-29 DIAGNOSIS — M25.561 ACUTE PAIN OF RIGHT KNEE: ICD-10-CM

## 2023-08-29 DIAGNOSIS — I10 PRIMARY HYPERTENSION: ICD-10-CM

## 2023-08-29 PROCEDURE — 99214 OFFICE O/P EST MOD 30 MIN: CPT | Performed by: INTERNAL MEDICINE

## 2023-08-29 PROCEDURE — 73562 X-RAY EXAM OF KNEE 3: CPT

## 2023-08-29 RX ORDER — EVOLOCUMAB 140 MG/ML
INJECTION, SOLUTION SUBCUTANEOUS
COMMUNITY
Start: 2023-08-25

## 2023-09-14 ENCOUNTER — OFFICE VISIT (OUTPATIENT)
Dept: CARDIOLOGY | Facility: CLINIC | Age: 63
End: 2023-09-14
Payer: COMMERCIAL

## 2023-09-14 VITALS
WEIGHT: 204 LBS | HEART RATE: 58 BPM | BODY MASS INDEX: 32.02 KG/M2 | DIASTOLIC BLOOD PRESSURE: 93 MMHG | SYSTOLIC BLOOD PRESSURE: 142 MMHG | HEIGHT: 67 IN

## 2023-09-14 DIAGNOSIS — R00.2 PALPITATION: ICD-10-CM

## 2023-09-14 DIAGNOSIS — I10 ESSENTIAL HYPERTENSION: Primary | ICD-10-CM

## 2023-09-14 DIAGNOSIS — I10 PRIMARY HYPERTENSION: ICD-10-CM

## 2023-09-14 DIAGNOSIS — R07.2 PRECORDIAL PAIN: ICD-10-CM

## 2023-09-14 DIAGNOSIS — E78.00 PURE HYPERCHOLESTEROLEMIA: ICD-10-CM

## 2023-09-14 PROCEDURE — 99214 OFFICE O/P EST MOD 30 MIN: CPT | Performed by: INTERNAL MEDICINE

## 2023-09-14 NOTE — PROGRESS NOTES
6 month f/u    Subjective:        Bruna Jacobs is a 63 y.o. female who here for follow up    CC  Follow-up hypertension palpitation  HPI  63-year-old female with a hypertension palpitation here for the follow-up with no complaints of chest pain tightness heaviness or the pressure sensation     Problems Addressed this Visit          Cardiac and Vasculature    Essential hypertension - Primary    Hyperlipidemia    Palpitation    Precordial pain     Other Visit Diagnoses       Primary hypertension              Diagnoses         Codes Comments    Essential hypertension    -  Primary ICD-10-CM: I10  ICD-9-CM: 401.9     Pure hypercholesterolemia     ICD-10-CM: E78.00  ICD-9-CM: 272.0     Palpitation     ICD-10-CM: R00.2  ICD-9-CM: 785.1     Precordial pain     ICD-10-CM: R07.2  ICD-9-CM: 786.51     Primary hypertension     ICD-10-CM: I10  ICD-9-CM: 401.9           .    The following portions of the patient's history were reviewed and updated as appropriate: allergies, current medications, past family history, past medical history, past social history, past surgical history and problem list.    Past Medical History:   Diagnosis Date    GERD (gastroesophageal reflux disease)     Glaucoma     Heart murmur     History of thyroid cyst     History of transfusion     Hyperlipidemia     Hypertension     Sinus congestion     Sleep apnea      reports that she has never smoked. She has never used smokeless tobacco. She reports current alcohol use. She reports that she does not use drugs.   Family History   Problem Relation Age of Onset    Diabetes Mother     Heart disease Mother     Hypertension Mother     Diabetes Father     Hypertension Father     Heart failure Father     Heart disease Father     Heart attack Neg Hx     Malig Hyperthermia Neg Hx        Review of Systems  Constitutional: No wt loss, fever, fatigue  Gastrointestinal: No nausea, abdominal pain  Behavioral/Psych: No insomnia or anxiety   Cardiovascular no chest  "pains or tightness in the chest  Objective:       Physical Exam           Physical Exam  /93 (BP Location: Left arm, Patient Position: Sitting, Cuff Size: Adult)   Pulse 58   Ht 170.2 cm (67\")   Wt 92.5 kg (204 lb)   LMP  (LMP Unknown)   BMI 31.95 kg/m²     General appearance: No acute changes   Eyes: Sclerae conjunctivae normal, pupils reactive   HENT: Atraumatic; oropharynx clear with moist mucous membranes and no mucosal ulcerations;  Neck: Trachea midline; NECK, supple, no thyromegaly or lymphadenopathy   Lungs: Normal size and shape, normal breath sounds, equal distribution of air, no rales and rhonchi   CV: S1-S2 regular, no murmurs, no rub, no gallop   Abdomen: Soft, nontender; no masses , no abnormal abdominal sounds   Extremities: No deformity , normal color , no peripheral edema   Skin: Normal temperature, turgor and texture; no rash, ulcers  Psych: Appropriate affect, alert and oriented to person, place and time           Procedures      Echocardiogram:        Current Outpatient Medications:     amLODIPine (NORVASC) 5 MG tablet, TAKE ONE TABLET BY MOUTH DAILY (Patient taking differently: 2 (Two) Times a Day.), Disp: 30 tablet, Rfl: 5    aspirin  MG tablet, Take 1 tablet by mouth Daily., Disp: , Rfl:     escitalopram (LEXAPRO) 10 MG tablet, TAKE ONE TABLET BY MOUTH DAILY, Disp: 90 tablet, Rfl: 3    famotidine (PEPCID) 20 MG tablet, Take 1 tablet by mouth 2 (Two) Times a Day As Needed., Disp: , Rfl:     fexofenadine (ALLEGRA) 180 MG tablet, Take 1 tablet by mouth Daily., Disp: 30 tablet, Rfl: 2    fluticasone (FLONASE) 50 MCG/ACT nasal spray, 2 sprays into the nostril(s) as directed by provider Daily., Disp: 11 mL, Rfl: 1    meloxicam (Mobic) 7.5 MG tablet, Take 1 tablet by mouth Daily., Disp: 30 tablet, Rfl: 0    Repatha SureClick solution auto-injector SureClick injection, , Disp: , Rfl:     spironolactone (Aldactone) 25 MG tablet, Take 1 tablet by mouth 2 (Two) Times a Day. (Patient " taking differently: Take 1 tablet by mouth Daily.), Disp: 60 tablet, Rfl: 4    timolol (TIMOPTIC) 0.5 % ophthalmic solution, Administer 1 drop to both eyes 2 (Two) Times a Day., Disp: , Rfl:     VITAMIN D PO, Take  by mouth., Disp: , Rfl:    Assessment:        Patient Active Problem List   Diagnosis    Essential hypertension    Fatigue    Left carotid bruit    Palpitations    Hyperlipidemia    Palpitation    Precordial pain    Thyroid nodule    Heart murmur    History of adenomatous polyp of colon               Plan:            ICD-10-CM ICD-9-CM   1. Essential hypertension  I10 401.9   2. Pure hypercholesterolemia  E78.00 272.0   3. Palpitation  R00.2 785.1   4. Precordial pain  R07.2 786.51   5. Primary hypertension  I10 401.9     1. Essential hypertension  Blood pressure under control    2. Pure hypercholesterolemia  Continue current treatment    3. Palpitation  Under control    4. Precordial pain  Atypical    5. Primary hypertension  Continue current treatment      Bp managed by nephro    1 yr  COUNSELING:    Bruna Price was given to patient for the following topics: diagnostic results, risk factor reductions, impressions, risks and benefits of treatment options and importance of treatment compliance .       SMOKING COUNSELING:        Dictated using Dragon dictation

## 2023-09-26 NOTE — PROGRESS NOTES
08/29/2023    Assessment & Plan     This 63-year-old patient presents today for follow-up of hypertension.  She also relates she has had some new onset swelling in her right knee off and on for the past week or so.  And she also has an occasional earache in the right.  She has a past medical history of CKD 3 and has been followed by Dr. Morgan, nephrologist.  And dates she was seen by him 2 days before this visit.  As a result of his evaluation and recommendations.  He has increased amlodipine to 10 mg p.o. daily from 5 and decrease her spironolactone to 25 mg twice daily.    Evaluation of the right knee shows no evidence of any swelling at this time.  Patient relates has been going on for approximately a month or so.  She characterizes it as low-grade aching in nature but she is concerned about it.     She ambulates without difficulty .  Drawer sign is negative.  There is no evidence of strain of the lateral or medial collateral ligaments.  Review of her labs shows that her last GFR was excellent at 97.3.    Diagnoses and all orders for this visit:    1. Acute pain of right knee (Primary)  -     XR Knee 3 View Right; Future    2. Primary hypertension    3. Anxiety state      Plan:  1.)  X-ray of the right knee  2.)  Follow-up in 3 to 4 months.       CC: Joint Swelling (Right knee swelling.  ) and Earache (Right ear ---no other issues)  .        HPI  History of Present Illness     Subjective   Bruna Jacobs is a 63 y.o. female.      The following portions of the patient's history were reviewed and updated as appropriate: allergies, current medications, past family history, past medical history, past social history, past surgical history, and problem list.    Problem List  Patient Active Problem List   Diagnosis    Essential hypertension    Fatigue    Left carotid bruit    Palpitations    Hyperlipidemia    Palpitation    Precordial pain    Thyroid nodule    Heart murmur    History of adenomatous polyp of colon        Past Medical History  Past Medical History:   Diagnosis Date    GERD (gastroesophageal reflux disease)     Glaucoma     Heart murmur     History of thyroid cyst     History of transfusion     Hyperlipidemia     Hypertension     Sinus congestion     Sleep apnea        Surgical History  Past Surgical History:   Procedure Laterality Date    CARDIAC CATHETERIZATION N/A 03/15/2019    Procedure: Left Heart Cath;  Surgeon: Graciela Boland MD;  Location:  [facility] CATH INVASIVE LOCATION;  Service: Cardiology    CARDIAC CATHETERIZATION N/A 03/15/2019    Procedure: Coronary angiography;  Surgeon: Graciela Boland MD;  Location:  [facility] CATH INVASIVE LOCATION;  Service: Cardiology    COLONOSCOPY      COLONOSCOPY N/A 10/13/2022    Procedure: COLONOSCOPY TO CECUM;  Surgeon: Yves Berg MD;  Location:  [facility] ENDOSCOPY;  Service: General;  Laterality: N/A;  HX OF COLON POLYPS  POST: DIVERTICULOSIS    HYSTERECTOMY      THYROIDECTOMY, PARTIAL         Family History  Family History   Problem Relation Age of Onset    Diabetes Mother     Heart disease Mother     Hypertension Mother     Diabetes Father     Hypertension Father     Heart failure Father     Heart disease Father     Heart attack Neg Hx     Malig Hyperthermia Neg Hx        Social History  Social History    Tobacco Use      Smoking status: Never      Smokeless tobacco: Never       Is the Patient a current tobacco user? No    Allergies  Allergies   Allergen Reactions    Atorvastatin Myalgia    Niacin Unknown - Low Severity     CANNOT REMEMBER    Rosuvastatin Myalgia    Statins Myalgia       Current Medications    Current Outpatient Medications:     amLODIPine (NORVASC) 5 MG tablet, TAKE ONE TABLET BY MOUTH DAILY (Patient taking differently: 2 (Two) Times a Day.), Disp: 30 tablet, Rfl: 5    aspirin  MG tablet, Take 1 tablet by mouth Daily., Disp: , Rfl:     escitalopram (LEXAPRO) 10 MG tablet, TAKE ONE TABLET BY MOUTH DAILY, Disp: 90 tablet, Rfl: 3     famotidine (PEPCID) 20 MG tablet, Take 1 tablet by mouth 2 (Two) Times a Day As Needed., Disp: , Rfl:     fexofenadine (ALLEGRA) 180 MG tablet, Take 1 tablet by mouth Daily., Disp: 30 tablet, Rfl: 2    fluticasone (FLONASE) 50 MCG/ACT nasal spray, 2 sprays into the nostril(s) as directed by provider Daily., Disp: 11 mL, Rfl: 1    meloxicam (Mobic) 7.5 MG tablet, Take 1 tablet by mouth Daily., Disp: 30 tablet, Rfl: 0    Repatha SureClick solution auto-injector SureClick injection, , Disp: , Rfl:     spironolactone (Aldactone) 25 MG tablet, Take 1 tablet by mouth 2 (Two) Times a Day. (Patient taking differently: Take 1 tablet by mouth Daily.), Disp: 60 tablet, Rfl: 4    timolol (TIMOPTIC) 0.5 % ophthalmic solution, Administer 1 drop to both eyes 2 (Two) Times a Day., Disp: , Rfl:     VITAMIN D PO, Take  by mouth., Disp: , Rfl:      Review of System  Review of Systems  I have reviewed and confirmed the accuracy of the ROS as documented by the MA/LPN/RN Edward Hawk MD    Vitals:    08/29/23 0726   BP: 120/84     Body mass index is 32.11 kg/m².    Objective     Physical Exam  Physical Exam        Edward Hawk MD  08/29/2023

## 2023-10-03 ENCOUNTER — OFFICE VISIT (OUTPATIENT)
Dept: FAMILY MEDICINE CLINIC | Facility: CLINIC | Age: 63
End: 2023-10-03
Payer: COMMERCIAL

## 2023-10-03 VITALS
WEIGHT: 207 LBS | BODY MASS INDEX: 32.49 KG/M2 | SYSTOLIC BLOOD PRESSURE: 130 MMHG | HEIGHT: 67 IN | DIASTOLIC BLOOD PRESSURE: 80 MMHG

## 2023-10-03 DIAGNOSIS — Z23 NEED FOR INFLUENZA VACCINATION: Primary | ICD-10-CM

## 2023-10-03 DIAGNOSIS — G89.29 CHRONIC PAIN OF RIGHT KNEE: ICD-10-CM

## 2023-10-03 DIAGNOSIS — M25.561 CHRONIC PAIN OF RIGHT KNEE: ICD-10-CM

## 2023-10-03 RX ORDER — NAPROXEN 375 MG/1
TABLET ORAL
Qty: 40 TABLET | Refills: 2 | Status: SHIPPED | OUTPATIENT
Start: 2023-10-03

## 2023-10-19 NOTE — PROGRESS NOTES
10/03/2023    Assessment & Plan     This 63-year-old patient relates that she continues to have pain in her right knee especially when walking.  She relates that it swells after walking.  She relates that she is trying to become more active by walking more but is inhibited because of the pain in her knee.  She denies any recent trauma such as falls or motor vehicle accidents.  Physical examination is unremarkable at this point.  There is no drawer sign.  Medial and lateral collateral ligaments are intact.    X-ray of the right knee done on 8/29 shows moderate medial tibiofemoral joint space narrowing.  This was discussed with the patient.  At this point we can offer NSAIDs to help with the discomfort but I feel that further evaluation by orthopedic surgery is needed.    Diagnoses and all orders for this visit:    1. Need for influenza vaccination (Primary)  -     Fluzone (or Fluarix & Flulaval for VFC) >6mos    2. Chronic pain of right knee  -     Ambulatory Referral to Orthopedic Surgery    Other orders  -     naproxen (NAPROSYN) 375 MG tablet; 1 bid with food  Dispense: 40 tablet; Refill: 2      Plan:  1.)  Naprosyn 375 mg 1 tab p.o. twice daily with food  2.)  Referral to Dr. Jessica reyna for further evaluation.       CC: Knee Pain (Follow up xray.---no other issues)  .        HPI  History of Present Illness     Subjective   Bruna Jacobs is a 63 y.o. female.      The following portions of the patient's history were reviewed and updated as appropriate: allergies, current medications, past family history, past medical history, past social history, past surgical history, and problem list.    Problem List  Patient Active Problem List   Diagnosis    Essential hypertension    Fatigue    Left carotid bruit    Palpitations    Hyperlipidemia    Palpitation    Precordial pain    Thyroid nodule    Heart murmur    History of adenomatous polyp of colon       Past Medical History  Past Medical History:   Diagnosis Date     GERD (gastroesophageal reflux disease)     Glaucoma     Heart murmur     History of thyroid cyst     History of transfusion     Hyperlipidemia     Hypertension     Sinus congestion     Sleep apnea        Surgical History  Past Surgical History:   Procedure Laterality Date    CARDIAC CATHETERIZATION N/A 03/15/2019    Procedure: Left Heart Cath;  Surgeon: Graciela Boland MD;  Location:  [facility] CATH INVASIVE LOCATION;  Service: Cardiology    CARDIAC CATHETERIZATION N/A 03/15/2019    Procedure: Coronary angiography;  Surgeon: Graciela Boland MD;  Location:  [facility] CATH INVASIVE LOCATION;  Service: Cardiology    COLONOSCOPY      COLONOSCOPY N/A 10/13/2022    Procedure: COLONOSCOPY TO CECUM;  Surgeon: Yves Berg MD;  Location:  [facility] ENDOSCOPY;  Service: General;  Laterality: N/A;  HX OF COLON POLYPS  POST: DIVERTICULOSIS    HYSTERECTOMY      THYROIDECTOMY, PARTIAL         Family History  Family History   Problem Relation Age of Onset    Diabetes Mother     Heart disease Mother     Hypertension Mother     Diabetes Father     Hypertension Father     Heart failure Father     Heart disease Father     Heart attack Neg Hx     Malig Hyperthermia Neg Hx        Social History  Social History    Tobacco Use      Smoking status: Never      Smokeless tobacco: Never       Is the Patient a current tobacco user? No    Allergies  Allergies   Allergen Reactions    Atorvastatin Myalgia    Niacin Unknown - Low Severity     CANNOT REMEMBER    Rosuvastatin Myalgia    Statins Myalgia       Current Medications    Current Outpatient Medications:     amLODIPine (NORVASC) 5 MG tablet, TAKE ONE TABLET BY MOUTH DAILY (Patient taking differently: 2 (Two) Times a Day.), Disp: 30 tablet, Rfl: 5    aspirin  MG tablet, Take 1 tablet by mouth Daily., Disp: , Rfl:     escitalopram (LEXAPRO) 10 MG tablet, TAKE ONE TABLET BY MOUTH DAILY, Disp: 90 tablet, Rfl: 3    famotidine (PEPCID) 20 MG tablet, Take 1 tablet by mouth 2 (Two)  Times a Day As Needed., Disp: , Rfl:     fexofenadine (ALLEGRA) 180 MG tablet, Take 1 tablet by mouth Daily., Disp: 30 tablet, Rfl: 2    fluticasone (FLONASE) 50 MCG/ACT nasal spray, 2 sprays into the nostril(s) as directed by provider Daily., Disp: 11 mL, Rfl: 1    meloxicam (Mobic) 7.5 MG tablet, Take 1 tablet by mouth Daily., Disp: 30 tablet, Rfl: 0    Repatha SureClick solution auto-injector SureClick injection, , Disp: , Rfl:     spironolactone (Aldactone) 25 MG tablet, Take 1 tablet by mouth 2 (Two) Times a Day. (Patient taking differently: Take 1 tablet by mouth Daily.), Disp: 60 tablet, Rfl: 4    timolol (TIMOPTIC) 0.5 % ophthalmic solution, Administer 1 drop to both eyes 2 (Two) Times a Day., Disp: , Rfl:     VITAMIN D PO, Take  by mouth., Disp: , Rfl:     naproxen (NAPROSYN) 375 MG tablet, 1 bid with food, Disp: 40 tablet, Rfl: 2     Review of System  Review of Systems  I have reviewed and confirmed the accuracy of the ROS as documented by the MA/LPN/RN Edward Hawk MD    Vitals:    10/03/23 1147   BP: 130/80     Body mass index is 32.42 kg/m².    Objective     Physical Exam  Physical Exam        Edward Hawk MD  10/03/2023

## 2023-10-30 NOTE — TELEPHONE ENCOUNTER
Caller: Bruna Jacobs    Relationship: Self    Best call back number: [phone]    Requested Prescriptions:   Requested Prescriptions     Pending Prescriptions Disp Refills    fluticasone (FLONASE) 50 MCG/ACT nasal spray 11 mL 1     Si sprays into the nostril(s) as directed by provider Daily.        Pharmacy where request should be sent: [facility] [id number] [address] [phone] [facility] [phone] FX     Last office visit with prescribing clinician: 10/3/2023   Last telemedicine visit with prescribing clinician: Visit date not found   Next office visit with prescribing clinician: 2023     Additional details provided by patient:  PATIENT IS COMPLETELY OUT OF MEDICINE    Does the patient have less than a 3 day supply:  [x] Yes  [] No    Would you like a call back once the refill request has been completed: [] Yes [] No    If the office needs to give you a call back, can they leave a voicemail: [] Yes [] No    Raul Forrester Rep   10/30/23 11:07 EDT

## 2023-10-31 RX ORDER — FLUTICASONE PROPIONATE 50 MCG
SPRAY, SUSPENSION (ML) NASAL
Qty: 16 ML | Refills: 3 | Status: SHIPPED | OUTPATIENT
Start: 2023-10-31

## 2023-10-31 RX ORDER — FLUTICASONE PROPIONATE 50 MCG
2 SPRAY, SUSPENSION (ML) NASAL DAILY
Qty: 11 ML | Refills: 1 | OUTPATIENT
Start: 2023-10-31

## 2023-11-03 ENCOUNTER — DOCUMENTATION (OUTPATIENT)
Dept: PHYSICAL THERAPY | Facility: CLINIC | Age: 63
End: 2023-11-03
Payer: COMMERCIAL

## 2023-11-30 ENCOUNTER — TELEPHONE (OUTPATIENT)
Dept: FAMILY MEDICINE CLINIC | Facility: CLINIC | Age: 63
End: 2023-11-30
Payer: COMMERCIAL

## 2024-01-11 RX ORDER — ESCITALOPRAM OXALATE 10 MG/1
TABLET ORAL
Qty: 90 TABLET | Refills: 3 | Status: SHIPPED | OUTPATIENT
Start: 2024-01-11

## 2024-01-19 ENCOUNTER — TELEPHONE (OUTPATIENT)
Dept: FAMILY MEDICINE CLINIC | Facility: CLINIC | Age: 64
End: 2024-01-19

## 2024-01-19 NOTE — TELEPHONE ENCOUNTER
Caller: Bruna Jacobs    Relationship: Self    Best call back number: [phone]    Which medication are you concerned about:     escitalopram (LEXAPRO) 10 MG tablet     Who prescribed you this medication: DR DE LA VEGA    What are your concerns: PATIENT STATES THIS MEDICATION MAKES HER GLAUCOMA WORSE, PLEASE ADVISE IF THERE IS A DIFFERENT MEDICATION SHE CAN TAKE INSTEAD. PLEASE SEND TO [facility].

## 2024-01-30 NOTE — PROGRESS NOTES
[facility] ED  eMERGENCY dEPARTMENT eNCOUnter   Independent Attestation     Pt Name: Mikki Musa  MRN: [id number]  Birthdate 1986  Date of evaluation: 1/29/24   Mikki Musa is a 37 y.o. female who presents with Hand Injury (Burn to tip of right thumb on a piece of hot carpet at work last Thursday. Pt states that she resolved the blister last night with warm water but is still experiencing numbness and throbbing to thumb.   )    Vitals:   Vitals:    01/29/24 1014   BP: (!) 119/102   Pulse: 84   Resp: 18   Temp: 98.8 °F (37.1 °C)   TempSrc: Oral   SpO2: 98%   Weight: 59.9 kg (132 lb)   Height: 1.651 m (5' 5\")     Impression:   1. Visit for wound check    2. Partial thickness burn of right thumb, initial encounter      I was personally available for consultation in the Emergency Department. I have reviewed the chart and agree with the documentation as recorded by the MLP, including the assessment, treatment plan and disposition.  Silverio Guidry MD  Attending Emergency  Physician                  Silverio Guidry MD  01/29/24 1412     Subjective Chief complaint is dizziness  Bruna Jacobs is a 62 y.o. female.     History of Present Illness Bruna is here today for complaints of dizziness.  This morning when she was getting up she felt dizzy.  She had to lay back down and close her eyes.  She was later able to get up and go to the bathroom and the dizziness seem to be persisting.  This was described as a spinning sensation.  However since making the appointment coming here the dizziness is gone.  She did not have any other focal neurologic signs or symptoms.  She has had an earache.  She has had some sinus congestion.    The following portions of the patient's history were reviewed and updated as appropriate: allergies, current medications, past family history, past medical history, past social history, past surgical history and problem list.    Review of Systems   Constitutional: Negative for chills and fever.   Neurological: Positive for dizziness. Negative for speech difficulty, weakness and numbness.       Objective   Physical Exam  Vitals and nursing note reviewed.   HENT:      Right Ear: Tympanic membrane and ear canal normal.      Left Ear: Tympanic membrane and ear canal normal.   Eyes:      Extraocular Movements: Extraocular movements intact.      Pupils: Pupils are equal, round, and reactive to light.      Comments: There is no nystagmus   Cardiovascular:      Rate and Rhythm: Normal rate and regular rhythm.   Pulmonary:      Effort: Pulmonary effort is normal.      Breath sounds: No wheezing, rhonchi or rales.   Neurological:      General: No focal deficit present.      Mental Status: She is alert.      Cranial Nerves: No cranial nerve deficit.      Coordination: Coordination normal.      Comments: Romberg testing is negative           Assessment & Plan   Diagnoses and all orders for this visit:    1. Labyrinthitis, unspecified laterality (Primary)    Other orders  -     methylPREDNISolone (MEDROL) 4 MG tablet; Take 2 tabs every 12  hours  Dispense: 4 tablet; Refill: 0  -     meclizine (ANTIVERT) 25 MG tablet; Take 1 tablet by mouth 3 (Three) Times a Day As Needed for Dizziness.  Dispense: 30 tablet; Refill: 0    Bruna is here today for transient vertigo symptoms which I think are likely going to be a labyrinthitis.  Going to give her little bit of methylprednisolone and Antivert.  If her symptoms return or persist she should contact her usual physician for possible referral to an ear nose and throat doctor for physical therapy for Epley maneuvers and vestibular retraining

## 2024-02-05 NOTE — TELEPHONE ENCOUNTER
Caller: Arlene Bruna KAUSHIK    Relationship: Self    Best call back number: [phone]    Requested Prescriptions:   Requested Prescriptions     Pending Prescriptions Disp Refills   • amLODIPine (NORVASC) 5 MG tablet 30 tablet 3     Sig: Take 1 tablet by mouth Daily.        Pharmacy where request should be sent: [facility] [id number] - [address] AT [address] [place] [address] - [phone]  - [phone]      Additional details provided by patient:     Does the patient have less than a 3 day supply:  [] Yes  [] No    Would you like a call back once the refill request has been completed: [x] Yes [] No    If the office needs to give you a call back, can they leave a voicemail: [x] Yes [] No    Raul Quinn Rep   02/09/23 11:43 EST    Stable

## 2024-02-21 ENCOUNTER — OFFICE VISIT (OUTPATIENT)
Dept: FAMILY MEDICINE CLINIC | Facility: CLINIC | Age: 64
End: 2024-02-21
Payer: COMMERCIAL

## 2024-02-21 VITALS
RESPIRATION RATE: 18 BRPM | TEMPERATURE: 97.5 F | OXYGEN SATURATION: 99 % | SYSTOLIC BLOOD PRESSURE: 140 MMHG | WEIGHT: 206 LBS | BODY MASS INDEX: 32.33 KG/M2 | DIASTOLIC BLOOD PRESSURE: 78 MMHG | HEIGHT: 67 IN | HEART RATE: 56 BPM

## 2024-02-21 DIAGNOSIS — G89.29 CHRONIC PAIN OF RIGHT KNEE: ICD-10-CM

## 2024-02-21 DIAGNOSIS — Z23 NEED FOR COVID-19 VACCINE: Primary | ICD-10-CM

## 2024-02-21 DIAGNOSIS — I10 ESSENTIAL HYPERTENSION: ICD-10-CM

## 2024-02-21 DIAGNOSIS — J32.0 CHRONIC MAXILLARY SINUSITIS: ICD-10-CM

## 2024-02-21 DIAGNOSIS — F41.1 ANXIETY STATE: ICD-10-CM

## 2024-02-21 DIAGNOSIS — M25.561 CHRONIC PAIN OF RIGHT KNEE: ICD-10-CM

## 2024-02-21 NOTE — PROGRESS NOTES
02/21/2024    Assessment & Plan     This 63-year-old patient presents at this time accompanied by her  who gives historical information.  The patient is here today for follow-up of hypertension and degenerative joint disease.  She relates that in the interim of visit she was seen by nephrology who discontinued her amlodipine and started her on spironolactone and nifedipine.  Nifedipine 60 mg p.o. daily while the spironolactone is 25 mg 1 tab p.o. twice daily.  She relates that she stopped taking her blood pressure medicine several days ago because she thought it was making her lose her hair.  Her blood pressure was initially 140/78 on recheck by me in the left arm sitting position standard cuff it was 134/80.    Weight is essentially unchanged from her last visit back in October 2023.    Also in the interim she was seen by Dr. Jessica reese orthopedic surgeon for degenerative joint disease.  Patient relates that her discomfort at this time is significantly improved.    She also complains of new onset of allergy symptoms for 10 days.  She relates her use she has had some facial discomfort.  She has noticed no change in her hearing and has had a runny nose.  She is currently taking fexofenadine but takes it only on a as needed basis.    Diagnoses and all orders for this visit:    1. Need for COVID-19 vaccine (Primary)  -     COVID-19 F23 (Pfizer) 12yrs+ (COMIRNATY)    2. Chronic pain of right knee    3. Essential hypertension    4. Chronic maxillary sinusitis      BMI is >= 30 and <35. (Class 1 Obesity). The following options were offered after discussion;: nutrition counseling/recommendations       Plan:  1.)  Patient is to start taking her fexofenadine daily instead of as needed.  2.)  Continue Naprosyn 375 mg 1 tab p.o. twice daily or meloxicam as per Dr. Lopez failed for degenerative arthritis.    CC: Follow-up (Sinus and bp)  .        HPI  History of Present Illness     Subjective   Bruna Jacobs is a 63  y.o. female.      The following portions of the patient's history were reviewed and updated as appropriate: allergies, current medications, past family history, past medical history, past social history, past surgical history, and problem list.    Problem List  Patient Active Problem List   Diagnosis    Essential hypertension    Fatigue    Left carotid bruit    Palpitations    Hyperlipidemia    Palpitation    Precordial pain    Thyroid nodule    Heart murmur    History of adenomatous polyp of colon       Past Medical History  Past Medical History:   Diagnosis Date    GERD (gastroesophageal reflux disease)     Glaucoma     Heart murmur     History of thyroid cyst     History of transfusion     Hyperlipidemia     Hypertension     Sinus congestion     Sleep apnea        Surgical History  Past Surgical History:   Procedure Laterality Date    CARDIAC CATHETERIZATION N/A 03/15/2019    Procedure: Left Heart Cath;  Surgeon: Graciela Boland MD;  Location:  [facility];  Service: Cardiology    CARDIAC CATHETERIZATION N/A 03/15/2019    Procedure: Coronary angiography;  Surgeon: Graciela Boland MD;  Location:  [facility];  Service: Cardiology    COLONOSCOPY      COLONOSCOPY N/A 10/13/2022    Procedure: COLONOSCOPY TO CECUM;  Surgeon: Yves Berg MD;  Location:  [facility];  Service: General;  Laterality: N/A;  HX OF COLON POLYPS  POST: DIVERTICULOSIS    HYSTERECTOMY      THYROIDECTOMY, PARTIAL         Family History  Family History   Problem Relation Age of Onset    Diabetes Mother     Heart disease Mother     Hypertension Mother     Diabetes Father     Hypertension Father     Heart failure Father     Heart disease Father     Heart attack Neg Hx     Malig Hyperthermia Neg Hx        Social History  Social History    Tobacco Use      Smoking status: Never      Smokeless tobacco: Never       Is the Patient a current tobacco user? No    Allergies  Allergies   Allergen  Reactions    Atorvastatin Myalgia    Niacin Unknown - Low Severity     CANNOT REMEMBER    Rosuvastatin Myalgia    Statins Myalgia       Current Medications    Current Outpatient Medications:     aspirin  MG tablet, Take 1 tablet by mouth Daily., Disp: , Rfl:     escitalopram (LEXAPRO) 10 MG tablet, TAKE ONE TABLET BY MOUTH DAILY, Disp: 90 tablet, Rfl: 3    famotidine (PEPCID) 20 MG tablet, Take 1 tablet by mouth 2 (Two) Times a Day As Needed., Disp: , Rfl:     fexofenadine (ALLEGRA) 180 MG tablet, Take 1 tablet by mouth Daily., Disp: 30 tablet, Rfl: 2    fluticasone (FLONASE) 50 MCG/ACT nasal spray, SPRAY TWO SPRAYS IN THE EACH NOSTRIL DAILY AS DIRECTED BY DR, Disp: 16 mL, Rfl: 3    Repatha SureClick solution auto-injector SureClick injection, , Disp: , Rfl:     spironolactone (Aldactone) 25 MG tablet, Take 1 tablet by mouth 2 (Two) Times a Day. (Patient taking differently: Take 1 tablet by mouth Daily.), Disp: 60 tablet, Rfl: 4    timolol (TIMOPTIC) 0.5 % ophthalmic solution, Administer 1 drop to both eyes 2 (Two) Times a Day., Disp: , Rfl:     amLODIPine (NORVASC) 5 MG tablet, TAKE ONE TABLET BY MOUTH DAILY (Patient not taking: Reported on 2/21/2024), Disp: 30 tablet, Rfl: 5    meloxicam (Mobic) 7.5 MG tablet, Take 1 tablet by mouth Daily. (Patient not taking: Reported on 2/21/2024), Disp: 30 tablet, Rfl: 0    naproxen (NAPROSYN) 375 MG tablet, 1 bid with food (Patient not taking: Reported on 2/21/2024), Disp: 40 tablet, Rfl: 2    VITAMIN D PO, Take  by mouth., Disp: , Rfl:      Review of System  Review of Systems   Constitutional:  Negative for chills and fever.   Respiratory:  Negative for cough and shortness of breath.    Cardiovascular:  Negative for chest pain and palpitations.   Gastrointestinal:  Negative for constipation, diarrhea, nausea and vomiting.   Neurological:  Negative for dizziness and headache.     I have reviewed and confirmed the accuracy of the ROS as documented by the MA/LPN/RN Edward  CINDY Hawk MD    Vitals:    02/21/24 0908   BP: 140/78   Pulse: 56   Resp: 18   Temp: 97.5 °F (36.4 °C)   SpO2: 99%     Body mass index is 32.26 kg/m².    Objective     Physical Exam  Physical Exam  Constitutional:       General: She is not in acute distress.     Appearance: Normal appearance.   HENT:      Head: Normocephalic and atraumatic.   Cardiovascular:      Rate and Rhythm: Normal rate and regular rhythm.   Pulmonary:      Effort: Pulmonary effort is normal. No respiratory distress.      Breath sounds: Normal breath sounds. No wheezing, rhonchi or rales.   Neurological:      General: No focal deficit present.      Mental Status: She is alert and oriented to person, place, and time.   Psychiatric:         Mood and Affect: Mood normal.         Behavior: Behavior normal.         Thought Content: Thought content normal.         Judgment: Judgment normal.             Edward Hawk MD  02/21/2024

## 2024-03-03 RX ORDER — AZITHROMYCIN 250 MG/1
TABLET, FILM COATED ORAL
Qty: 6 TABLET | Refills: 0 | Status: SHIPPED | OUTPATIENT
Start: 2024-03-03

## 2024-03-11 RX ORDER — FEXOFENADINE HCL 180 MG/1
180 TABLET ORAL DAILY
Qty: 30 TABLET | Refills: 2 | Status: SHIPPED | OUTPATIENT
Start: 2024-03-11

## 2024-03-25 ENCOUNTER — TELEPHONE (OUTPATIENT)
Dept: FAMILY MEDICINE CLINIC | Facility: CLINIC | Age: 64
End: 2024-03-25
Payer: COMMERCIAL

## 2024-03-25 NOTE — TELEPHONE ENCOUNTER
----- Message from Edward Hawk MD sent at 3/24/2024  9:44 PM EDT -----  Call patient to notify of results    There are no suspicious nodules seen in your thyroid

## 2024-05-30 RX ORDER — AMLODIPINE BESYLATE 5 MG/1
5 TABLET ORAL DAILY
Qty: 90 TABLET | Refills: 1 | Status: SHIPPED | OUTPATIENT
Start: 2024-05-30

## 2024-06-03 RX ORDER — FEXOFENADINE HCL 180 MG/1
180 TABLET ORAL DAILY
Qty: 90 TABLET | Refills: 2 | Status: SHIPPED | OUTPATIENT
Start: 2024-06-03

## 2024-06-03 RX ORDER — SPIRONOLACTONE 25 MG/1
25 TABLET ORAL DAILY
Qty: 90 TABLET | Refills: 2 | Status: SHIPPED | OUTPATIENT
Start: 2024-06-03

## 2024-07-24 ENCOUNTER — OFFICE VISIT (OUTPATIENT)
Dept: FAMILY MEDICINE CLINIC | Facility: CLINIC | Age: 64
End: 2024-07-24
Payer: MEDICARE

## 2024-07-24 VITALS
BODY MASS INDEX: 32.65 KG/M2 | HEIGHT: 67 IN | SYSTOLIC BLOOD PRESSURE: 126 MMHG | DIASTOLIC BLOOD PRESSURE: 80 MMHG | WEIGHT: 208 LBS

## 2024-07-24 DIAGNOSIS — Z00.00 PREVENTATIVE HEALTH CARE: ICD-10-CM

## 2024-07-24 DIAGNOSIS — Z00.00 WELCOME TO MEDICARE PREVENTIVE VISIT: Primary | ICD-10-CM

## 2024-07-24 DIAGNOSIS — N39.3 STRESS INCONTINENCE: ICD-10-CM

## 2024-07-24 DIAGNOSIS — D50.9 IRON DEFICIENCY ANEMIA, UNSPECIFIED IRON DEFICIENCY ANEMIA TYPE: ICD-10-CM

## 2024-07-24 DIAGNOSIS — I10 PRIMARY HYPERTENSION: ICD-10-CM

## 2024-07-24 DIAGNOSIS — R35.1 NOCTURIA: ICD-10-CM

## 2024-07-24 DIAGNOSIS — E78.5 HYPERLIPIDEMIA, UNSPECIFIED HYPERLIPIDEMIA TYPE: ICD-10-CM

## 2024-07-24 RX ORDER — KETOTIFEN FUMARATE 0.35 MG/ML
SOLUTION/ DROPS OPHTHALMIC 2 TIMES DAILY
COMMUNITY

## 2024-07-24 NOTE — PROGRESS NOTES
Subjective   The ABCs of the Annual Wellness Visit  Medicare Wellness Visit      Bruna Jacobs is a 64 y.o. patient who presents for a Medicare Wellness Visit.    The following portions of the patient's history were reviewed and   updated as appropriate: allergies, current medications, past family history, past medical history, past social history, past surgical history, and problem list.    Compared to one year ago, the patient's physical   health is worse.  Compared to one year ago, the patient's mental   health is better.    Recent Hospitalizations:  She was not admitted to the hospital during the last year.     Current Medical Providers:  Patient Care Team:  Edward Hawk MD as PCP - General  Nury Delcid APRN as Nurse Practitioner (Cardiology)    Outpatient Medications Prior to Visit   Medication Sig Dispense Refill    amLODIPine (NORVASC) 5 MG tablet TAKE 1 TABLET BY MOUTH DAILY 90 tablet 1    aspirin  MG tablet Take 1 tablet by mouth Daily.      escitalopram (LEXAPRO) 10 MG tablet TAKE ONE TABLET BY MOUTH DAILY 90 tablet 3    famotidine (PEPCID) 20 MG tablet Take 1 tablet by mouth 2 (Two) Times a Day As Needed.      fexofenadine (ALLEGRA) 180 MG tablet TAKE 1 TABLET BY MOUTH DAILY 90 tablet 2    fluticasone (FLONASE) 50 MCG/ACT nasal spray SPRAY TWO SPRAYS IN THE EACH NOSTRIL DAILY AS DIRECTED BY  16 mL 3    Ketotifen Fumarate (ZADITOR) 0.035 % solution 2 (Two) Times a Day.      Multiple Vitamins-Minerals (PRESERVISION AREDS 2 PO) Take  by mouth.      Repatha SureClick solution auto-injector SureClick injection       spironolactone (ALDACTONE) 25 MG tablet Take 1 tablet by mouth Daily. 90 tablet 2    timolol (TIMOPTIC) 0.5 % ophthalmic solution Administer 1 drop to both eyes 2 (Two) Times a Day.      VITAMIN D PO Take  by mouth.      azithromycin (Zithromax Z-Reynaldo) 250 MG tablet Take 2 tablets by mouth on day 1, then 1 tablet daily on days 2-5 6 tablet 0    meloxicam (Mobic) 7.5 MG  "tablet Take 1 tablet by mouth Daily. (Patient not taking: Reported on 2/21/2024) 30 tablet 0    naproxen (NAPROSYN) 375 MG tablet 1 bid with food (Patient not taking: Reported on 2/21/2024) 40 tablet 2     No facility-administered medications prior to visit.     No opioid medication identified on active medication list. I have reviewed chart for other potential  high risk medication/s and harmful drug interactions in the elderly.      Aspirin is on active medication list. Aspirin use is indicated based on review of current medical condition/s. Pros and cons of this therapy have been discussed today. Benefits of this medication outweigh potential harm.  Patient has been encouraged to continue taking this medication.  .      Patient Active Problem List   Diagnosis    Essential hypertension    Fatigue    Left carotid bruit    Palpitations    Hyperlipidemia    Palpitation    Precordial pain    Thyroid nodule    Heart murmur    History of adenomatous polyp of colon     Advance Care Planning (Click this link to access ACP Navigator)  Advance Directive is not on file.  ACP discussion was held with the patient during this visit. Patient does not have an advance directive, information provided.        Objective   Vitals:    07/24/24 1019   BP: 126/80   Weight: 94.3 kg (208 lb)   Height: 170.2 cm (67\")       Estimated body mass index is 32.58 kg/m² as calculated from the following:    Height as of this encounter: 170.2 cm (67\").    Weight as of this encounter: 94.3 kg (208 lb).          Jump to Steadi Fall Risk Flowsheet  Gait and Balance Evaluation:  Loss of Balance    Does the patient have evidence of cognitive impairment? No  Lab Results   Component Value Date    CHLPL 170 07/24/2024    TRIG 133 07/24/2024    HDL 62 (H) 07/24/2024    LDL 85 07/24/2024    VLDL 23 07/24/2024                                                                                                Health  Risk Assessment    Smoking Status:  Social " History     Tobacco Use   Smoking Status Never   Smokeless Tobacco Never     Alcohol Consumption:  Social History     Substance and Sexual Activity   Alcohol Use Yes    Comment: RARELY     Fall Risk Screen:  TORIE Fall Risk Assessment was completed, and patient is at HIGH risk for falls. Assessment completed on:2024    Depression Screenin/24/2024    10:32 AM   PHQ-2/PHQ-9 Depression Screening   Little Interest or Pleasure in Doing Things 0-->not at all   Feeling Down, Depressed or Hopeless 0-->not at all   PHQ-9: Brief Depression Severity Measure Score 0     Health Habits and Functional and Cognitive Screenin/24/2024    10:35 AM   Functional & Cognitive Status   Do you have difficulty preparing food and eating? No   Do you have difficulty bathing yourself, getting dressed or grooming yourself? No   Do you have difficulty using the toilet? No   Do you have difficulty moving around from place to place? No   Do you have trouble with steps or getting out of a bed or a chair? No   Current Diet Other   Dental Exam Not up to date   Eye Exam Up to date   Exercise (times per week) 3 times per week   Current Exercises Include Yard Work   Do you need help using the phone?  No   Are you deaf or do you have serious difficulty hearing?  No   Do you need help to go to places out of walking distance? No   Do you need help shopping? No   Do you need help preparing meals?  No   Do you need help with housework?  No   Do you need help with laundry? No   Do you need help taking your medications? No   Do you need help managing money? No   Do you ever drive or ride in a car without wearing a seat belt? No   Have you felt unusual stress, anger or loneliness in the last month? No   Who do you live with? Spouse   If you need help, do you have trouble finding someone available to you? No   Have you been bothered in the last four weeks by sexual problems? No   Do you have difficulty concentrating, remembering or making  decisions? No   Visual Acuity:  Vision Screening    Right eye Left eye Both eyes   Without correction      With correction 20/40 20/40 20/40       Age-appropriate Screening Schedule:  Refer to the list below for future screening recommendations based on patient's age, sex and/or medical conditions. Orders for these recommended tests are listed in the plan section. The patient has been provided with a written plan.    Health Maintenance List  Health Maintenance   Topic Date Due    DXA SCAN  Never done    Pneumococcal Vaccine 65+ (1 of 2 - PCV) Never done    TDAP/TD VACCINES (1 - Tdap) Never done    PAP SMEAR  01/22/2022    INFLUENZA VACCINE  08/01/2024    MAMMOGRAM  10/14/2024    ZOSTER VACCINE (1 of 2) 09/24/2024 (Originally 6/13/2009)    COVID-19 Vaccine (4 - 2023-24 season) 10/13/2024 (Originally 6/21/2024)    BMI FOLLOWUP  02/21/2025    ANNUAL WELLNESS VISIT  07/24/2025    LIPID PANEL  07/24/2025    COLORECTAL CANCER SCREENING  10/13/2027    HEPATITIS C SCREENING  Completed                                                                                                                                                CMS Preventative Services Quick Reference  Risk Factors Identified During Encounter  Glaucoma or Family History of Glaucoma:  Ophthalmology Appointment Recommended    The above risks/problems have been discussed with the patient.  Pertinent information has been shared with the patient in the After Visit Summary.  An After Visit Summary and PPPS were made available to the patient.    Follow Up:   Next Medicare Wellness visit to be scheduled in 1 year.         Additional E&M Note during same encounter follows:  Patient has additional, significant, and separately identifiable condition(s)/problem(s) that require work above and beyond the Medicare Wellness Visit     Chief Complaint  Welcome To Medicare    Subjective   HPI      Review of Systems   Constitutional:  Negative for chills and fever.   HENT:  "Negative.     Eyes: Negative.    Respiratory: Negative.  Negative for cough and shortness of breath.    Cardiovascular: Negative.  Negative for chest pain and palpitations.   Gastrointestinal:  Negative for constipation, diarrhea, nausea and vomiting.   Endocrine: Negative.    Neurological:  Negative for dizziness.   Hematological: Negative.    Psychiatric/Behavioral: Negative.                Objective   Vital Signs:  /80   Ht 170.2 cm (67\")   Wt 94.3 kg (208 lb)   BMI 32.58 kg/m²   Physical Exam  Vitals and nursing note reviewed.   Constitutional:       Appearance: She is well-developed.   HENT:      Head: Normocephalic and atraumatic.   Eyes:      Conjunctiva/sclera: Conjunctivae normal.   Cardiovascular:      Rate and Rhythm: Normal rate and regular rhythm.      Heart sounds: Normal heart sounds.   Pulmonary:      Effort: Pulmonary effort is normal.      Breath sounds: Normal breath sounds.   Abdominal:      General: Bowel sounds are normal.      Palpations: Abdomen is soft.   Musculoskeletal:         General: Normal range of motion.      Cervical back: Normal range of motion and neck supple.   Skin:     General: Skin is warm and dry.   Neurological:      Mental Status: She is alert and oriented to person, place, and time.   Psychiatric:         Behavior: Behavior normal.                 Assessment and Plan   This pleasant 64-year-old presents at this time for welcome to Medicare.  She has multiple somatic complaints.    Regarding anticipatory guidance.  We discussed the importance of keeping all LDL cholesterol less than 100.  Her last DL reviewed Was 5/23/2023 when her LDL was found to be 92.  We gave her a low-cholesterol diet sheet for implementation at our direction.    She has questions regarding Repatha medication that she was recently started on.  She relates that she is tolerating this much better than the statins for her elevated cholesterol readings.  She has a history of hypertension and note " is made that her blood pressure was well-controlled today at 126/80 in the left arm sitting position standard cuff she is currently on amlodipine 5 mg p.o. daily and spironolactone 25 mg daily for hypertension control.  She relates that she has had stress incontinence at times and we have discussed the etiology of this with her.    She relates she has not had a gynecologic examination in several years.                        Welcome to Medicare preventive visit    Primary hypertension    Hyperlipidemia, unspecified hyperlipidemia type     Nocturia    Iron deficiency anemia, unspecified iron deficiency anemia type    Preventative health care    Stress incontinence      Orders Placed This Encounter   Procedures    Comprehensive Metabolic Panel     Order Specific Question:   Release to patient     Answer:   Routine Release [[id number]]     Order Specific Question:   [facility] Has the patient fasted?     Answer:   Yes    Lipid Panel With / Chol / HDL Ratio     Order Specific Question:   Release to patient     Answer:   Routine Release [[id number]]     Order Specific Question:   [facility] Has the patient fasted?     Answer:   Yes    Urinalysis With Culture If Indicated -     Order Specific Question:   Release to patient     Answer:   Routine Release [[id number]]     Order Specific Question:   [facility] Has the patient fasted?     Answer:   Yes    Microscopic Examination -     Order Specific Question:   [facility] Has the patient fasted?     Answer:   Yes    Manual Differential     Order Specific Question:   [facility] Has the patient fasted?     Answer:   Yes    Ambulatory Referral to Gynecology     Referral Priority:   Routine     Referral Type:   Consultation     Referral Reason:   Specialty Services Required     Requested Specialty:   Gynecology     Number of Visits Requested:   1    Ambulatory Referral to Physical Therapy     Referral Priority:   Routine     Referral Type:   Physical Therapy     Referral Reason:   Specialty  Services Required     Requested Specialty:   Physical Therapy     Number of Visits Requested:   1    ECG 12 Lead     Order Specific Question:   Reason for Exam:     Answer:   welcome to medicare     Order Specific Question:   Release to patient     Answer:   Routine Release [[id number]]    CBC & Differential     Order Specific Question:   Manual Differential     Answer:   No     Order Specific Question:   Release to patient     Answer:   Routine Release [[id number]]     Order Specific Question:   [facility] Has the patient fasted?     Answer:   Yes             Follow Up   No follow-ups on file.  Patient was given instructions and counseling regarding her condition or for health maintenance advice. Please see specific information pulled into the AVS if appropriate.

## 2024-07-25 LAB
ALBUMIN SERPL-MCNC: 4.8 G/DL (ref 3.5–5.2)
ALBUMIN/GLOB SERPL: 1.8 G/DL
ALP SERPL-CCNC: 121 U/L (ref 39–117)
ALT SERPL-CCNC: 13 U/L (ref 1–33)
APPEARANCE UR: CLEAR
AST SERPL-CCNC: 19 U/L (ref 1–32)
BACTERIA #/AREA URNS HPF: NORMAL /[HPF]
BILIRUB SERPL-MCNC: 0.3 MG/DL (ref 0–1.2)
BILIRUB UR QL STRIP: NEGATIVE
BUN SERPL-MCNC: 9 MG/DL (ref 8–23)
BUN/CREAT SERPL: 12.9 (ref 7–25)
CALCIUM SERPL-MCNC: 9.5 MG/DL (ref 8.6–10.5)
CASTS URNS QL MICRO: NORMAL /LPF
CHLORIDE SERPL-SCNC: 101 MMOL/L (ref 98–107)
CHOLEST SERPL-MCNC: 170 MG/DL (ref 0–200)
CHOLEST/HDLC SERPL: 2.74 {RATIO}
CO2 SERPL-SCNC: 29.7 MMOL/L (ref 22–29)
COLOR UR: YELLOW
CREAT SERPL-MCNC: 0.7 MG/DL (ref 0.57–1)
DIFFERENTIAL COMMENT: ABNORMAL
EGFRCR SERPLBLD CKD-EPI 2021: 96.1 ML/MIN/1.73
EOSINOPHIL # BLD MANUAL: 0.06 10*3/MM3 (ref 0–0.4)
EOSINOPHIL NFR BLD MANUAL: 1 % (ref 0.3–6.2)
EPI CELLS #/AREA URNS HPF: NORMAL /HPF (ref 0–10)
ERYTHROCYTE [DISTWIDTH] IN BLOOD BY AUTOMATED COUNT: 14.6 % (ref 12.3–15.4)
GLOBULIN SER CALC-MCNC: 2.7 GM/DL
GLUCOSE SERPL-MCNC: 102 MG/DL (ref 65–99)
GLUCOSE UR QL STRIP: NEGATIVE
HCT VFR BLD AUTO: 39.4 % (ref 34–46.6)
HDLC SERPL-MCNC: 62 MG/DL (ref 40–60)
HGB BLD-MCNC: 13.1 G/DL (ref 12–15.9)
HGB UR QL STRIP: NEGATIVE
KETONES UR QL STRIP: ABNORMAL
LDLC SERPL CALC-MCNC: 85 MG/DL (ref 0–100)
LEUKOCYTE ESTERASE UR QL STRIP: NEGATIVE
LYMPHOCYTES # BLD MANUAL: 3.33 10*3/MM3 (ref 0.7–3.1)
LYMPHOCYTES NFR BLD MANUAL: 59.4 % (ref 19.6–45.3)
MCH RBC QN AUTO: 29.8 PG (ref 26.6–33)
MCHC RBC AUTO-ENTMCNC: 33.2 G/DL (ref 31.5–35.7)
MCV RBC AUTO: 89.5 FL (ref 79–97)
MICRO URNS: ABNORMAL
MICRO URNS: ABNORMAL
MONOCYTES # BLD MANUAL: 0.29 10*3/MM3 (ref 0.1–0.9)
MONOCYTES NFR BLD MANUAL: 5.2 % (ref 5–12)
NEUTROPHILS # BLD MANUAL: 1.87 10*3/MM3 (ref 1.7–7)
NEUTROPHILS NFR BLD MANUAL: 33.3 % (ref 42.7–76)
NITRITE UR QL STRIP: NEGATIVE
NRBC BLD AUTO-RTO: 0 /100 WBC (ref 0–0.2)
PH UR STRIP: 5.5 [PH] (ref 5–7.5)
PLATELET # BLD AUTO: 289 10*3/MM3 (ref 140–450)
PLATELET BLD QL SMEAR: ABNORMAL
POTASSIUM SERPL-SCNC: 4.2 MMOL/L (ref 3.5–5.2)
PROT SERPL-MCNC: 7.5 G/DL (ref 6–8.5)
PROT UR QL STRIP: ABNORMAL
RBC # BLD AUTO: 4.4 10*6/MM3 (ref 3.77–5.28)
RBC #/AREA URNS HPF: NORMAL /HPF (ref 0–2)
RBC MORPH BLD: ABNORMAL
SODIUM SERPL-SCNC: 139 MMOL/L (ref 136–145)
SP GR UR STRIP: 1.02 (ref 1–1.03)
TRIGL SERPL-MCNC: 133 MG/DL (ref 0–150)
URINALYSIS REFLEX: ABNORMAL
UROBILINOGEN UR STRIP-MCNC: 1 MG/DL (ref 0.2–1)
VLDLC SERPL CALC-MCNC: 23 MG/DL (ref 5–40)
WBC # BLD AUTO: 5.61 10*3/MM3 (ref 3.4–10.8)
WBC #/AREA URNS HPF: NORMAL /HPF (ref 0–5)

## 2024-08-30 ENCOUNTER — TELEPHONE (OUTPATIENT)
Dept: FAMILY MEDICINE CLINIC | Facility: CLINIC | Age: 64
End: 2024-08-30

## 2024-08-30 NOTE — TELEPHONE ENCOUNTER
Caller: Bruna Jacobs    Relationship: Self    Best call back number: [phone]       What are your current symptoms: COUGH FEVER SNEEZING WITH GREEN AND WHITE DISCHARGE    How long have you been experiencing symptoms: 3 DAYS    Have you had these symptoms before:    [x] Yes  [] No    Have you been treated for these symptoms before:   [x] Yes  [] No    If a prescription is needed, what is your preferred pharmacy and phone number: [facility] [id number] [address] AT [address] [place] [address] [phone] [facility] [phone]      Additional notes: PATIENT SAYS SHE BELIEVES SHE HAS A SINUS INFECTION.  WOULD LIKE SOMETHING CALLED IN TO RELIEVE SYMPTOMS.  PLEASE CALL TO FOLLOW UP IF NEEDED

## 2024-09-09 ENCOUNTER — TELEPHONE (OUTPATIENT)
Dept: FAMILY MEDICINE CLINIC | Facility: CLINIC | Age: 64
End: 2024-09-09
Payer: MEDICARE

## 2024-09-09 NOTE — TELEPHONE ENCOUNTER
The PT called the office in regards of  experiencing the same symptoms that she previously had ( coughing, sneezing, congestion in chest) and, she stating that the medication that was previously on for this is not helping.  PT was advised to go ER or urgent care if symptoms worsen before appointment

## 2024-09-10 ENCOUNTER — OFFICE VISIT (OUTPATIENT)
Dept: FAMILY MEDICINE CLINIC | Facility: CLINIC | Age: 64
End: 2024-09-10
Payer: MEDICARE

## 2024-09-10 VITALS
WEIGHT: 207 LBS | TEMPERATURE: 98.3 F | SYSTOLIC BLOOD PRESSURE: 100 MMHG | BODY MASS INDEX: 32.49 KG/M2 | HEIGHT: 67 IN | DIASTOLIC BLOOD PRESSURE: 66 MMHG

## 2024-09-10 DIAGNOSIS — R05.9 COUGH, UNSPECIFIED TYPE: Primary | ICD-10-CM

## 2024-09-10 DIAGNOSIS — I10 PRIMARY HYPERTENSION: ICD-10-CM

## 2024-09-10 DIAGNOSIS — J01.90 SUBACUTE SINUSITIS, UNSPECIFIED LOCATION: ICD-10-CM

## 2024-09-10 PROCEDURE — 1160F RVW MEDS BY RX/DR IN RCRD: CPT | Performed by: INTERNAL MEDICINE

## 2024-09-10 PROCEDURE — 1126F AMNT PAIN NOTED NONE PRSNT: CPT | Performed by: INTERNAL MEDICINE

## 2024-09-10 PROCEDURE — 3078F DIAST BP <80 MM HG: CPT | Performed by: INTERNAL MEDICINE

## 2024-09-10 PROCEDURE — 3074F SYST BP LT 130 MM HG: CPT | Performed by: INTERNAL MEDICINE

## 2024-09-10 PROCEDURE — 1159F MED LIST DOCD IN RCRD: CPT | Performed by: INTERNAL MEDICINE

## 2024-09-10 PROCEDURE — 99213 OFFICE O/P EST LOW 20 MIN: CPT | Performed by: INTERNAL MEDICINE

## 2024-09-10 PROCEDURE — 87428 SARSCOV & INF VIR A&B AG IA: CPT | Performed by: INTERNAL MEDICINE

## 2024-09-10 RX ORDER — AZITHROMYCIN 250 MG/1
TABLET, FILM COATED ORAL
Qty: 6 TABLET | Refills: 0 | Status: SHIPPED | OUTPATIENT
Start: 2024-09-10

## 2024-09-10 RX ORDER — FUROSEMIDE 20 MG
TABLET ORAL
COMMUNITY
Start: 2024-08-21

## 2024-09-10 RX ORDER — FEXOFENADINE HCL 180 MG/1
180 TABLET ORAL DAILY
Qty: 30 TABLET | Refills: 2 | Status: SHIPPED | OUTPATIENT
Start: 2024-09-10

## 2024-09-10 NOTE — PROGRESS NOTES
09/10/2024    Assessment & Plan   This 64-year-old patient presents at this time for same-day appointment with complaints of facial discomfort and pain x 1 week, coughing x 1 week that is productive of white and greenish sputum.  She denies any chills or fever.  She denies any change in the hearing.  Her lungs are clear to auscultation and resonant to percussion with normal respiratory excursions are appreciated.    COVID-19 antigen test is negative.      Diagnoses and all orders for this visit:    1. Cough, unspecified type (Primary)  -     POCT SARS-CoV-2 + Flu Antigen TIM      Plan:  1.)  Azithromycin pack  2.)  Allegra 180 mg 1 tab p.o. daily  3.)           CC: URI (Cough, sneezing, congestion.  Started 3 weeks ago.)  .        HPI  URI   Associated symptoms include congestion. Pertinent negatives include no coughing.        Subjective   Bruna Jacobs is a 64 y.o. female.      The following portions of the patient's history were reviewed and updated as appropriate: allergies, current medications, past family history, past medical history, past social history, past surgical history, and problem list.    Problem List  Patient Active Problem List   Diagnosis    Essential hypertension    Fatigue    Left carotid bruit    Palpitations    Hyperlipidemia    Palpitation    Precordial pain    Thyroid nodule    Heart murmur    History of adenomatous polyp of colon       Past Medical History  Past Medical History:   Diagnosis Date    GERD (gastroesophageal reflux disease)     Glaucoma     Heart murmur     History of thyroid cyst     History of transfusion     Hyperlipidemia     Hypertension     Sinus congestion     Sleep apnea        Surgical History  Past Surgical History:   Procedure Laterality Date    CARDIAC CATHETERIZATION N/A 03/15/2019    Procedure: Left Heart Cath;  Surgeon: Graciela Boland MD;  Location: [facility] CATH INVASIVE LOCATION;  Service: Cardiology    CARDIAC CATHETERIZATION N/A 03/15/2019     Procedure: Coronary angiography;  Surgeon: Graciela Boland MD;  Location: [facility] CATH INVASIVE LOCATION;  Service: Cardiology    COLONOSCOPY      COLONOSCOPY N/A 10/13/2022    Procedure: COLONOSCOPY TO CECUM;  Surgeon: Yves Berg MD;  Location:  [facility];  Service: General;  Laterality: N/A;  HX OF COLON POLYPS  POST: DIVERTICULOSIS    HYSTERECTOMY      THYROIDECTOMY, PARTIAL         Family History  Family History   Problem Relation Age of Onset    Diabetes Mother     Heart disease Mother     Hypertension Mother     Diabetes Father     Hypertension Father     Heart failure Father     Heart disease Father     Heart attack Neg Hx     Malig Hyperthermia Neg Hx        Social History  Social History    Tobacco Use      Smoking status: Never      Smokeless tobacco: Never       Is the Patient a current tobacco user? No    Allergies  Allergies   Allergen Reactions    Atorvastatin Myalgia    Niacin Unknown - Low Severity     CANNOT REMEMBER    Rosuvastatin Myalgia    Statins Myalgia       Current Medications    Current Outpatient Medications:     aspirin  MG tablet, Take 1 tablet by mouth Daily., Disp: , Rfl:     escitalopram (LEXAPRO) 10 MG tablet, TAKE ONE TABLET BY MOUTH DAILY, Disp: 90 tablet, Rfl: 3    famotidine (PEPCID) 20 MG tablet, Take 1 tablet by mouth 2 (Two) Times a Day As Needed., Disp: , Rfl:     fexofenadine (ALLEGRA) 180 MG tablet, TAKE 1 TABLET BY MOUTH DAILY, Disp: 90 tablet, Rfl: 2    fluticasone (FLONASE) 50 MCG/ACT nasal spray, SPRAY TWO SPRAYS IN THE EACH NOSTRIL DAILY AS DIRECTED BY DR, Disp: 16 mL, Rfl: 3    furosemide (LASIX) 20 MG tablet, As needed, Disp: , Rfl:     Ketotifen Fumarate (ZADITOR) 0.035 % solution, 2 (Two) Times a Day., Disp: , Rfl:     Multiple Vitamins-Minerals (PRESERVISION AREDS 2 PO), Take  by mouth., Disp: , Rfl:     NIFEdipine CC (ADALAT CC) 60 MG 24 hr tablet, Take 1 tablet by mouth Daily., Disp: , Rfl:     timolol (TIMOPTIC) 0.5 % ophthalmic  solution, Administer 1 drop to both eyes 2 (Two) Times a Day., Disp: , Rfl:     VITAMIN D PO, Take  by mouth., Disp: , Rfl:     Repatha SureClick solution auto-injector SureClick injection, , Disp: , Rfl:     spironolactone (ALDACTONE) 25 MG tablet, Take 1 tablet by mouth Daily., Disp: 90 tablet, Rfl: 2     Review of System  Review of Systems   HENT:  Positive for congestion and sinus pressure.    Respiratory:  Negative for cough.      I have reviewed and confirmed the accuracy of the ROS as documented by the MA/LPN/RN Edward Hawk MD    Vitals:    09/10/24 0844   BP: 100/66   Temp: 98.3 °F (36.8 °C)     Body mass index is 32.42 kg/m².    Objective     Physical Exam  Physical Exam  Constitutional:       Appearance: Normal appearance.   HENT:      Nose: Nose normal.   Cardiovascular:      Rate and Rhythm: Normal rate and regular rhythm.   Pulmonary:      Effort: Pulmonary effort is normal.      Breath sounds: Normal breath sounds.   Neurological:      Mental Status: She is alert.             Edward Hawk MD  09/10/2024

## 2024-09-19 ENCOUNTER — OFFICE VISIT (OUTPATIENT)
Dept: CARDIOLOGY | Facility: CLINIC | Age: 64
End: 2024-09-19
Payer: MEDICARE

## 2024-09-19 ENCOUNTER — TELEPHONE (OUTPATIENT)
Dept: FAMILY MEDICINE CLINIC | Facility: CLINIC | Age: 64
End: 2024-09-19

## 2024-09-19 VITALS
BODY MASS INDEX: 32.18 KG/M2 | DIASTOLIC BLOOD PRESSURE: 81 MMHG | WEIGHT: 205 LBS | HEIGHT: 67 IN | SYSTOLIC BLOOD PRESSURE: 117 MMHG | HEART RATE: 71 BPM

## 2024-09-19 DIAGNOSIS — E78.00 PURE HYPERCHOLESTEROLEMIA: ICD-10-CM

## 2024-09-19 DIAGNOSIS — R00.2 PALPITATION: ICD-10-CM

## 2024-09-19 DIAGNOSIS — R07.2 PRECORDIAL PAIN: ICD-10-CM

## 2024-09-19 DIAGNOSIS — I10 ESSENTIAL HYPERTENSION: Primary | ICD-10-CM

## 2024-09-19 DIAGNOSIS — N39.3 STRESS INCONTINENCE OF URINE: Primary | ICD-10-CM

## 2024-09-19 NOTE — PROGRESS NOTES
"1YR   Subjective:        Bruna Jacobs is a 64 y.o. female who here for follow up    CC  LT ARM PAIN ALL THE TIME  HPI  64-year-old female complaining of the left arm pain all the time     Problems Addressed this Visit          Cardiac and Vasculature    Essential hypertension - Primary    Hyperlipidemia    Palpitation    Precordial pain     Diagnoses         Codes Comments    Essential hypertension    -  Primary ICD-10-CM: I10  ICD-9-CM: 401.9     Palpitation     ICD-10-CM: R00.2  ICD-9-CM: 785.1     Pure hypercholesterolemia     ICD-10-CM: E78.00  ICD-9-CM: 272.0     Precordial pain     ICD-10-CM: R07.2  ICD-9-CM: 786.51           .    The following portions of the patient's history were reviewed and updated as appropriate: allergies, current medications, past family history, past medical history, past social history, past surgical history and problem list.    Past Medical History:   Diagnosis Date    GERD (gastroesophageal reflux disease)     Glaucoma     Heart murmur     History of thyroid cyst     History of transfusion     Hyperlipidemia     Hypertension     Sinus congestion     Sleep apnea     Vertigo      reports that she has never smoked. She has never used smokeless tobacco. She reports that she does not currently use alcohol. She reports that she does not use drugs.   Family History   Problem Relation Age of Onset    Diabetes Mother     Heart disease Mother     Hypertension Mother     Diabetes Father     Hypertension Father     Heart failure Father     Heart disease Father     Heart attack Neg Hx     Malig Hyperthermia Neg Hx        Review of Systems  Constitutional: No wt loss, fever, fatigue  Gastrointestinal: No nausea, abdominal pain  Behavioral/Psych: No insomnia or anxiety   Cardiovascular left arm pain  Objective:       Physical Exam           Physical Exam  /81   Pulse 71   Ht 170.2 cm (67\")   Wt 93 kg (205 lb)   LMP  (LMP Unknown)   BMI 32.11 kg/m²     General appearance: No acute " changes   Eyes: Sclerae conjunctivae normal, pupils reactive   HENT: Atraumatic; oropharynx clear with moist mucous membranes and no mucosal ulcerations;  Neck: Trachea midline; NECK, supple, no thyromegaly or lymphadenopathy   Lungs: Normal size and shape, normal breath sounds, equal distribution of air, no rales and rhonchi   CV: S1-S2 regular, no murmurs, no rub, no gallop   Abdomen: Soft, nontender; no masses , no abnormal abdominal sounds   Extremities: No deformity , normal color , no peripheral edema   Skin: Normal temperature, turgor and texture; no rash, ulcers  Psych: Appropriate affect, alert and oriented to person, place and time             ECG 12 Lead    Date/Time: 9/19/2024 10:53 AM  Performed by: Graciela Boland MD    Authorized by: Edward Hawk MD  Comparison: compared with previous ECG   Similar to previous ECG  Rhythm: sinus rhythm    Clinical impression: non-specific ECG            Echocardiogram:    Results for orders placed in visit on 02/20/23    Adult Transthoracic Echo Complete W/ Cont if Necessary Per Protocol    Interpretation Summary    Left ventricular ejection fraction appears to be 61 - 65%.    Left ventricular diastolic function was normal.    There is calcification of the aortic valve.    There is a small (<1cm) pericardial effusion. There is no evidence of cardiac tamponade.          Current Outpatient Medications:     aspirin  MG tablet, Take 1 tablet by mouth Daily., Disp: , Rfl:     escitalopram (LEXAPRO) 10 MG tablet, TAKE ONE TABLET BY MOUTH DAILY, Disp: 90 tablet, Rfl: 3    famotidine (PEPCID) 20 MG tablet, Take 1 tablet by mouth 2 (Two) Times a Day As Needed., Disp: , Rfl:     fexofenadine (Allegra Allergy) 180 MG tablet, Take 1 tablet by mouth Daily., Disp: 30 tablet, Rfl: 2    fluticasone (FLONASE) 50 MCG/ACT nasal spray, SPRAY TWO SPRAYS IN THE EACH NOSTRIL DAILY AS DIRECTED BY DR, Disp: 16 mL, Rfl: 3    Ketotifen Fumarate (ZADITOR) 0.035 % solution, 2  (Two) Times a Day., Disp: , Rfl:     Multiple Vitamins-Minerals (ICAPS AREDS 2 PO), Take  by mouth., Disp: , Rfl:     Multiple Vitamins-Minerals (PRESERVISION AREDS 2 PO), Take  by mouth., Disp: , Rfl:     NIFEdipine CC (ADALAT CC) 60 MG 24 hr tablet, Take 1 tablet by mouth Daily., Disp: , Rfl:     spironolactone (ALDACTONE) 25 MG tablet, Take 1 tablet by mouth Daily., Disp: 90 tablet, Rfl: 2    timolol (TIMOPTIC) 0.5 % ophthalmic solution, Administer 1 drop to both eyes 2 (Two) Times a Day., Disp: , Rfl:     VITAMIN D PO, Take  by mouth., Disp: , Rfl:    Assessment:                Plan:          ICD-10-CM ICD-9-CM   1. Essential hypertension  I10 401.9   2. Palpitation  R00.2 785.1   3. Pure hypercholesterolemia  E78.00 272.0   4. Precordial pain  R07.2 786.51     1. Essential hypertension  Patient understands importance of blood pressure check at home which patient does regularly and the blood pressures are well under control to the level of less than 140/90      2. Palpitation  Palpitations under control    3. Pure hypercholesterolemia  Continue current treatment    4. Precordial pain  Atypical      1 YR ECHO  COUNSELING:    Bruna Price was given to patient for the following topics: diagnostic results, risk factor reductions, impressions, risks and benefits of treatment options and importance of treatment compliance .       SMOKING COUNSELING:        Dictated using Dragon dictation

## 2024-09-19 NOTE — TELEPHONE ENCOUNTER
DELETE AFTER REVIEWING: Send this encounter to the appropriate pool. See your Call Action Grid or Workflows for direction.    Caller: Bruna Jacobs KAUSHIK    Relationship: Self    Best call back number: [phone]     What is the medical concern/diagnosis: PELVIC    What specialty or service is being requested: PHYSICAL THERAPY    Any additional details: PATIENT IS REQUESTING A NEW REFERRAL BE SENT AGAIN.  PREVIOUS REQUEST HAS LAPSED      DELETE AFTER READING TO PATIENT: “ Thank you for sharing this information. I will send a message to the clinical team. Please allow 48 hours for the clinical staff to follow up on this request.”

## 2024-10-22 ENCOUNTER — HOSPITAL ENCOUNTER (OUTPATIENT)
Dept: PHYSICAL THERAPY | Facility: HOSPITAL | Age: 64
Setting detail: THERAPIES SERIES
Discharge: HOME | End: 2024-10-22
Payer: MEDICARE

## 2024-10-22 DIAGNOSIS — N81.89 PELVIC FLOOR WEAKNESS: ICD-10-CM

## 2024-10-22 DIAGNOSIS — N39.3 STRESS INCONTINENCE: Primary | ICD-10-CM

## 2024-10-22 DIAGNOSIS — M62.89 PELVIC FLOOR DYSFUNCTION: ICD-10-CM

## 2024-10-22 PROCEDURE — 97162 PT EVAL MOD COMPLEX 30 MIN: CPT

## 2024-10-22 NOTE — THERAPY EVALUATION
Outpatient Physical Therapy Ortho Initial Evaluation  [facility]     Patient Name: Bruna Jacobs  : 1960  MRN: [id number]  Today's Date: 10/22/2024      Visit Date: 10/22/2024    Patient Active Problem List   Diagnosis    Essential hypertension    Fatigue    Left carotid bruit    Palpitations    Hyperlipidemia    Palpitation    Precordial pain    Thyroid nodule    Heart murmur    History of adenomatous polyp of colon        Past Medical History:   Diagnosis Date    GERD (gastroesophageal reflux disease)     Glaucoma     Heart murmur     History of thyroid cyst     History of transfusion     Hyperlipidemia     Hypertension     Sinus congestion     Sleep apnea     Vertigo         Past Surgical History:   Procedure Laterality Date    CARDIAC CATHETERIZATION N/A 03/15/2019    Procedure: Left Heart Cath;  Surgeon: Graciela Boland MD;  Location:  [facility] CATH INVASIVE [facility];  Service: Cardiology    CARDIAC CATHETERIZATION N/A 03/15/2019    Procedure: Coronary angiography;  Surgeon: Grcaiela Boland MD;  Location:  [facility] CATH INVASIVE LOC[facility];  Service: Cardiology    COLONOSCOPY      COLONOSCOPY N/A 10/13/2022    Procedure: COLONOSCOPY TO CECUM;  Surgeon: Yves Berg MD;  Location:  [facility] ENDOSCOPY;  Service: General;  Laterality: N/A;  HX OF COLON POLYPS  POST: DIVERTICULOSIS    HYSTERECTOMY      THYROIDECTOMY, PARTIAL         Visit Dx:     ICD-10-CM ICD-9-CM   1. Stress incontinence  N39.3 [id number]   2. Pelvic floor dysfunction  M62.89 618.83   3. Pelvic floor weakness  N81.89 618.89          Patient History       Row Name 10/22/24 0800             Fall Risk Assessment    Any falls in the past year: No  -RS         Services    Are you currently receiving Home Health services No  -RS         Daily Activities    Primary Language English  -RS      Are you able to read Yes  -RS      Are you able to write Yes  -RS      How does patient learn best? Listening;Reading;Demonstration  -RS       Pt Participated in POC and Goals Yes  -RS         Safety    Are you being hurt, hit, or frightened by anyone at home or in your life? No  -RS      Are you being neglected by a caregiver No  -RS                User Key  (r) = Recorded By, (t) = Taken By, (c) = Cosigned By      Initials Name Provider Type    RS Christy Fontana, PT Physical Therapist                     PT Ortho       Row Name 10/22/24 0800       Posture/Observations    Posture/Observations Comments decreased single limb stability RLE, inc lateral trunk lean  -RS              User Key  (r) = Recorded By, (t) = Taken By, (c) = Cosigned By      Initials Name Provider Type    RS Christy Fontana PT Physical Therapist                             Pelvic Health       Row Name 10/22/24 0800             Subjective    Patient Reason for Visit Stress Incontinence  -RS      Brief Description of Chief Complaint The pt is a 63 yo female who presents with KARLY present over a year. She is taking a medication for HTN and lasix which cause her to urinate more often than before. She has given birth 3x vaginally and had a partial hysterectomy. She is retired and currently spends her time with her grandchildren. She wears a liner during the day and changes it 2-3 times per day. She has HTN and HLD and experiences swelling in her LE. She has R knee pain and L elbow pain, hx R knee surgery (scope?).  -RS      Patient Participated in POC and Goals Yes  -RS         Fall Risk Assessment    Any falls in the past year: No  -RS         Services    Are you currently receiving Home Health services No  -RS         Daily Activities    Primary Language English  -RS      Are you able to read Yes  -RS      Are you able to write Yes  -RS      How does patient learn best? Listening;Reading;Demonstration  -RS      Pt Participated in POC and Goals Yes  -RS         Safety    Are you being hurt, hit, or frightened by anyone at home or in your life? No  -RS      Are you being neglected by a  caregiver No  -RS         Urinary/Bowel History    Stress incontinence yes  -RS         Pregnancy/Sexual History    Number of Pregnancies 3  -RS      Do you have radicular pain or numbness? No  -RS      Are you currently exercising? No  -RS      Sexual health occasional discomfort secondary to dryness  -RS         General ROM    GENERAL ROM COMMENTS WFL B knee flexion  -RS         Pelvic Floor Muscle    Patient/Parent/Guardian Consented to Internal Pelvic Floor Exam --  pt declined  -RS         Outcome Measures    Outcome Measure Options Quality of Life Symptom Disability Index  -RS         Incontinence Impact Questionnaire    Ability to do household chores (cooking, housecleaning, laundry)? 0  -RS      Physical recreation such as walking, swimming, or other exercise? 0  -RS      Entertainment activities (movies, concerts, etc)? 0  -RS      Ability to travel by car or bus more than 30 minutes from home? 1  -RS      Participation in social activities outside your home? 1  -RS      Emotional health (nervousness, depression, etc.)? 0  -RS      Feeling frustrated? 1  -RS      Incontinence Impact Total 3  -RS         MMT (Manual Muscle Testing)    Rt Lower Ext Rt Hip Flexion;Rt Hip ABduction;Rt Hip Extension;Rt Hip Internal (Medial) Rotation;Rt Hip External (Lateral) Rotation;Rt Knee Extension;Rt Knee Flexion;Rt Ankle Plantarflexion;Rt Ankle Dorsiflexion  -RS      Lt Lower Ext Lt Hip Flexion;Lt Hip Extension;Lt Hip ABduction;Lt Hip Internal (Medial) Rotation;Lt Hip External (Lateral) Rotation;Lt Knee Extension;Lt Knee Flexion;Lt Ankle Plantarflexion;Lt Ankle Dorsiflexion  -RS         MMT Right Lower Ext    Rt Hip Flexion MMT, Gross Movement (4/5) good  -RS      Rt Hip Extension MMT, Gross Movement (4-/5) good minus  -RS      Rt Hip ABduction MMT, Gross Movement (4-/5) good minus  -RS      Rt Hip Internal (Medial) Rotation MMT, Gross Movement (4-/5) good minus  -RS      Rt Hip External (Lateral) Rotation MMT, Gross  Movement (4-/5) good minus  -RS      Rt Knee Extension MMT, Gross Movement (4/5) good  -RS      Rt Knee Flexion MMT, Gross Movement (4/5) good  -RS      Rt Ankle Plantarflexion MMT, Gross Movement (4/5) good  -RS      Rt Ankle Dorsiflexion MMT, Gross Movement (4+/5) good plus  -RS         MMT Left Lower Ext    Lt Hip Flexion MMT, Gross Movement (4/5) good  -RS      Lt Hip Extension MMT, Gross Movement (4-/5) good minus  -RS      Lt Hip ABduction MMT, Gross Movement (4-/5) good minus  -RS      Lt Hip Internal (Medial) Rotation MMT, Gross Movement (4-/5) good minus  -RS      Lt Hip External (Lateral) Rotation MMT, Gross Movement (4/5) good  -RS      Lt Knee Extension MMT, Gross Movement (4+/5) good plus  -RS      Lt Knee Flexion MMT, Gross Movement (4+/5) good plus  -RS      Lt Ankle Plantarflexion MMT, Gross Movement (4+/5) good plus  -RS      Lt Ankle Dorsiflexion MMT, Gross Movement (4+/5) good plus  -RS                User Key  (r) = Recorded By, (t) = Taken By, (c) = Cosigned By      Initials Name Provider Type    RS Christy Fontana, PT Physical Therapist                    Therapy Education  Education Details: Role of  PF PT, POC, differential diagnosis, initial HEP, expectations, goals, anatomy, role of PF.  Program: New  How Provided: Verbal, Demonstration, Written  Provided to: Patient  Level of Understanding: Verbalized, Demonstrated      PT OP Goals       Row Name 10/22/24 0900          PT Short Term Goals    STG Date to Achieve 12/06/24  -RS     STG 1 The pt will report understanding of the use of the knack to facilitate improved pressure management.  -RS     STG 1 Progress New  -RS     STG 2 The pt will report understanding of appropriate fluid intake to facilitate improved hydration and understanding of self care.  -RS     STG 2 Progress New  -RS        Long Term Goals    LTG Date to Achieve 01/20/25  -RS     LTG 1 The pt will demonstrate IND and compliant with HEP focused on IND condition management  and return to PLOF.  -RS     LTG 1 Progress New  -RS     LTG 2 The pt will report voiding intervals in the AM on average at least 2 hours (compared to 1 hour) to facilitate improved participation in daily activities.  -RS     LTG 2 Progress New  -RS     LTG 3 The pt will demonstrate hip strength at least 4+/5 to facilitate improved funcitonal strength.  -RS     LTG 3 Progress New  -RS     LTG 4 The pt will demonstrate ability to contract PFM and TrA without bearing down to facilitate improved recreational activity performance.  -RS     LTG 4 Progress New  -RS     LTG 5 The pt will report at least 60% reduction in KARLY to facilitate improved pressure management and decreased involuntary urine loss.  -RS     LTG 5 Progress New  -RS        Time Calculation    PT Goal Re-Cert Due Date 01/20/25  -RS               User Key  (r) = Recorded By, (t) = Taken By, (c) = Cosigned By      Initials Name Provider Type    RS Christy Fontana, PT Physical Therapist                     PT Assessment/Plan       Row Name 10/22/24 0900          PT Assessment    Functional Limitations Limitation in home management;Limitations in community activities;Performance in self-care ADL;Performance in work activities  -RS     Impairments Impaired muscle length;Impaired muscle power;Impaired muscle endurance;Range of motion;Posture;Peripheral nerve integrity;Pain;Muscle strength  -RS     Assessment Comments Bruna Jacobs is a 64 y.o. female referred to physical therapy for KARLY. She presents with an evolving clinical presentation, along with  comorbidities of HTN and personal factors of chronicity of condition that may impact her progress in the plan of care. Pt presents today with decreased hip strength, decreased single limb stability, altered gait pattern . her signs and symptoms are consistent with referring diagnosis. The previous impairments limit her ability to perform daily activities without urine loss, manage pelvic pressure without KARLY,  participate in recreational activities. The pt self scores 14% disability on the IIQ (100=full disability).Pt will benefit from skilled PT to address the previous impairments and return to PLOF.  -RS     Please refer to paper survey for additional self-reported information No  -RS     Rehab Potential Good  -RS     Patient/caregiver participated in establishment of treatment plan and goals Yes  -RS     Patient would benefit from skilled therapy intervention Yes  -RS        PT Plan    PT Frequency 1x/week  -RS     Predicted Duration of Therapy Intervention (PT) 6-8 sessions  -RS     Planned CPT's? PT RE-EVAL: 99206;PT THER PROC EA 15 MIN: 58494;PT THER ACT EA 15 MIN: 96215;PT MANUAL THERAPY EA 15 MIN: 54492;PT NEUROMUSC RE-EDUCATION EA 15 MIN: 05832;PT GAIT TRAINING EA 15 MIN: 40116;PT SELF CARE/HOME MGMT/TRAIN EA 15: 23087;PT HOT OR COLD PACK TREAT MCARE;PT ELECTRICAL STIM UNATTEND: ;PT TRACTION LUMBAR: 66629;PT EVAL LOW COMPLEXITY: 49324  -RS     PT Plan Comments PFM assessment if agreeable or discuss at home, bladder review, PFM strengthening, HEP  -RS               User Key  (r) = Recorded By, (t) = Taken By, (c) = Cosigned By      Initials Name Provider Type    RS Christy Fontana, PT Physical Therapist                       OP Exercises       Row Name 10/22/24 0900 10/22/24 0800          Subjective    Patient Reason for Visit -- Stress Incontinence  -RS     Brief Description of Chief Complaint -- The pt is a 63 yo female who presents with KARLY present over a year. She is taking a medication for HTN and lasix which cause her to urinate more often than before. She has given birth 3x vaginally and had a partial hysterectomy. She is retired and currently spends her time with her grandchildren. She wears a liner during the day and changes it 2-3 times per day. She has HTN and HLD and experiences swelling in her LE. She has R knee pain and L elbow pain, hx R knee surgery (scope?).  -RS     Patient Participated in  POC and Goals -- Yes  -RS        Exercise 1    Exercise Name 1 discussion of role of PFM, PFPT, POC  -RS --        Exercise 2    Exercise Name 2 discussion of self monitoring of PFM ocntraction at home (visual tactile options)  -RS --               User Key  (r) = Recorded By, (t) = Taken By, (c) = Cosigned By      Initials Name Provider Type    RS Christy Fontana, BON Physical Therapist                                            Time Calculation:     Start Time: 0747  Stop Time: 0825  Time Calculation (min): 38 min  Untimed Charges  PT Eval/Re-eval Minutes: 38  Total Minutes  Untimed Charges Total Minutes: 38   Total Minutes: 38     Therapy Charges for Today       Code Description Service Date Service Provider Modifiers Qty    08675464574 HC PT EVAL MOD COMPLEXITY 3 10/22/2024 Christy Fontana, PT GP 1                      Christy Fontana PT  10/22/2024

## 2024-10-31 ENCOUNTER — HOSPITAL ENCOUNTER (OUTPATIENT)
Dept: PHYSICAL THERAPY | Facility: HOSPITAL | Age: 64
Setting detail: THERAPIES SERIES
Discharge: HOME | End: 2024-10-31
Payer: MEDICARE

## 2024-10-31 DIAGNOSIS — M62.89 PELVIC FLOOR DYSFUNCTION: ICD-10-CM

## 2024-10-31 DIAGNOSIS — N39.3 STRESS INCONTINENCE: Primary | ICD-10-CM

## 2024-10-31 DIAGNOSIS — N81.89 PELVIC FLOOR WEAKNESS: ICD-10-CM

## 2024-10-31 PROCEDURE — 97110 THERAPEUTIC EXERCISES: CPT

## 2024-10-31 PROCEDURE — 97530 THERAPEUTIC ACTIVITIES: CPT

## 2024-10-31 NOTE — THERAPY TREATMENT NOTE
Outpatient Physical Therapy Ortho Treatment Note  [facility]     Patient Name: Bruna Jacobs  : 1960  MRN: [id number]  Today's Date: 10/31/2024      Visit Date: 10/31/2024    Visit Dx:    ICD-10-CM ICD-9-CM   1. Stress incontinence  N39.3 [id number]   2. Pelvic floor dysfunction  M62.89 618.83   3. Pelvic floor weakness  N81.89 618.89       Patient Active Problem List   Diagnosis    Essential hypertension    Fatigue    Left carotid bruit    Palpitations    Hyperlipidemia    Palpitation    Precordial pain    Thyroid nodule    Heart murmur    History of adenomatous polyp of colon        Past Medical History:   Diagnosis Date    GERD (gastroesophageal reflux disease)     Glaucoma     Heart murmur     History of thyroid cyst     History of transfusion     Hyperlipidemia     Hypertension     Sinus congestion     Sleep apnea     Vertigo         Past Surgical History:   Procedure Laterality Date    CARDIAC CATHETERIZATION N/A 03/15/2019    Procedure: Left Heart Cath;  Surgeon: Graciela Boland MD;  Location: [facility] CATH INVASIVE LOCATION;  Service: Cardiology    CARDIAC CATHETERIZATION N/A 03/15/2019    Procedure: Coronary angiography;  Surgeon: Graciela Boland MD;  Location:  [facility]ATH [facility]VASIVE [facility];  Service: Cardiology    COLONOSCOPY      COLONOSCOPY N/A 10/13/2022    Procedure: COLONOSCOPY TO CECUM;  Surgeon: Yves Berg MD;  Location: [facility] ENDOSCOPY;  Service: General;  Laterality: N/A;  HX OF COLON POLYPS  POST: DIVERTICULOSIS    HYSTERECTOMY      THYROIDECTOMY, PARTIAL                          PT Assessment/Plan       Row Name 10/31/24 0900          PT Assessment    Assessment Comments Pt returns for first follow up session after initial eval with completed bladder log, pt drinking approx 16 ounes of water per daay (plus one cup tea and coffee) with 4-5 voids per day. Discussed appropriate fluid intake to improve hydration, pt reports understanding. She declines internal  assessment this date, provided verbal cues for appropriate PFM contraction and initiated basic PFM and hip strengthing with good tolerance. Provided updated HEP.  -RS        PT Plan    PT Plan Comments Consider Ar press, side steps  -RS               User Key  (r) = Recorded By, (t) = Taken By, (c) = Cosigned By      Initials Name Provider Type    RS Christy Fontana, PT Physical Therapist                       OP Exercises       Row Name 10/31/24 0900             Subjective    Subjective Comments pt presents with completed bladder log, noticig she does not drink much water  -RS         Subjective Pain    Able to rate subjective pain? yes  -RS      Pre-Treatment Pain Level 0  -RS      Post-Treatment Pain Level 0  -RS         Total Minutes    41046 - PT Therapeutic Exercise Minutes 23  -RS      65646 - PT Therapeutic Activity Minutes 15  -RS         Exercise 1    Exercise Name 1 long and short holds PFM in HL  -RS      Cueing 1 Verbal;Demo  -RS      Reps 1 10ea  -RS         Exercise 2    Exercise Name 2 bridge with PFM  -RS      Cueing 2 Verbal;Demo  -RS      Sets 2 2  -RS      Reps 2 10  -RS         Exercise 3    Exercise Name 3 PFM with TRA in HL  -RS      Cueing 3 Verbal;Demo  -RS      Reps 3 10  -RS      Time 3 5  -RS         Exercise 4    Exercise Name 4 sl clamshell  -RS      Cueing 4 Verbal;Demo  -RS      Reps 4 20  -RS      Time 4 RTB  -RS         Exercise 5    Exercise Name 5 seated PFM contraction  -RS      Sets 5 10  -RS      Reps 5 3  -RS         Exercise 6    Exercise Name 6 STS with PFM and exhale  -RS      Cueing 6 Verbal;Demo  -RS      Reps 6 10  -RS      Time 6 slightly elevated  -RS         Exercise 7    Exercise Name 7 review of appropriate hydration, bladder log  -RS         Exercise 8    Exercise Name 8 education- the knack  -RS                User Key  (r) = Recorded By, (t) = Taken By, (c) = Cosigned By      Initials Name Provider Type    RS Christy Fontana, PT Physical Therapist                                   PT OP Goals       Row Name 10/31/24 0900          PT Short Term Goals    STG Date to Achieve 12/06/24  -RS     STG 1 The pt will report understanding of the use of the knack to facilitate improved pressure management.  -RS     STG 1 Progress Progressing  -RS     STG 1 Progress Comments educated 10/31  -RS     STG 2 The pt will report understanding of appropriate fluid intake to facilitate improved hydration and understanding of self care.  -RS     STG 2 Progress Ongoing;Progressing  -RS     STG 2 Progress Comments reviewed 10/31  -RS        Long Term Goals    LTG Date to Achieve 01/20/25  -RS     LTG 1 The pt will demonstrate IND and compliant with HEP focused on IND condition management and return to PLOF.  -RS     LTG 1 Progress Ongoing  -RS     LTG 2 The pt will report voiding intervals in the AM on average at least 2 hours (compared to 1 hour) to facilitate improved participation in daily activities.  -RS     LTG 2 Progress Ongoing  -RS     LTG 3 The pt will demonstrate hip strength at least 4+/5 to facilitate improved funcitonal strength.  -RS     LTG 3 Progress Ongoing  -RS     LTG 4 The pt will demonstrate ability to contract PFM and TrA without bearing down to facilitate improved recreational activity performance.  -RS     LTG 4 Progress Ongoing  -RS     LTG 5 The pt will report at least 60% reduction in KARLY to facilitate improved pressure management and decreased involuntary urine loss.  -RS     LTG 5 Progress Ongoing  -RS               User Key  (r) = Recorded By, (t) = Taken By, (c) = Cosigned By      Initials Name Provider Type    Christy Radford, PT Physical Therapist                    Therapy Education  Education Details: Access Code [email or web address]  Given: HEP  Program: Reinforced, Progressed, New  How Provided: Verbal, Demonstration, Written  Provided to: Patient  Level of Understanding: Verbalized, Demonstrated              Time Calculation:   Start Time: 0917  Stop Time:  0955  Time Calculation (min): 38 min  Timed Charges  79232 - PT Therapeutic Exercise Minutes: 23  29217 - PT Therapeutic Activity Minutes: 15  Total Minutes  Timed Charges Total Minutes: 38   Total Minutes: 38  Therapy Charges for Today       Code Description Service Date Service Provider Modifiers Qty    37799006124  PT THER PROC EA 15 MIN 10/31/2024 Christy Fontana, PT GP 2    [id number]  PT THERAPEUTIC ACT EA 15 MIN 10/31/2024 Christy Fontana, PT GP 1                      Christy Fontana, PT  10/31/2024

## 2024-11-07 ENCOUNTER — HOSPITAL ENCOUNTER (OUTPATIENT)
Dept: PHYSICAL THERAPY | Facility: HOSPITAL | Age: 64
Setting detail: THERAPIES SERIES
Discharge: HOME | End: 2024-11-07
Payer: MEDICARE

## 2024-11-07 DIAGNOSIS — N39.3 STRESS INCONTINENCE: Primary | ICD-10-CM

## 2024-11-07 DIAGNOSIS — N81.89 PELVIC FLOOR WEAKNESS: ICD-10-CM

## 2024-11-07 DIAGNOSIS — M62.89 PELVIC FLOOR DYSFUNCTION: ICD-10-CM

## 2024-11-07 PROCEDURE — 97110 THERAPEUTIC EXERCISES: CPT

## 2024-11-07 PROCEDURE — 97530 THERAPEUTIC ACTIVITIES: CPT

## 2024-11-07 NOTE — THERAPY TREATMENT NOTE
Outpatient Physical Therapy Ortho Treatment Note  [facility]     Patient Name: Bruna Jacobs  : 1960  MRN: [id number]  Today's Date: 2024      Visit Date: 2024    Visit Dx:    ICD-10-CM ICD-9-CM   1. Stress incontinence  N39.3 [id number]   2. Pelvic floor dysfunction  M62.89 618.83   3. Pelvic floor weakness  N81.89 618.89       Patient Active Problem List   Diagnosis    Essential hypertension    Fatigue    Left carotid bruit    Palpitations    Hyperlipidemia    Palpitation    Precordial pain    Thyroid nodule    Heart murmur    History of adenomatous polyp of colon        Past Medical History:   Diagnosis Date    GERD (gastroesophageal reflux disease)     Glaucoma     Heart murmur     History of thyroid cyst     History of transfusion     Hyperlipidemia     Hypertension     Sinus congestion     Sleep apnea     Vertigo         Past Surgical History:   Procedure Laterality Date    CARDIAC CATHETERIZATION N/A 03/15/2019    Procedure: Left Heart Cath;  Surgeon: Graciela Boland MD;  Location: [facility] CATH INVASIVE LOCATION;  Service: Cardiology    CARDIAC CATHETERIZATION N/A 03/15/2019    Procedure: Coronary angiography;  Surgeon: Graciela Boland MD;  [facility];  Service: Cardiology    COLONOSCOPY      COLONOSCOPY N/A 10/13/2022    Procedure: COLONOSCOPY TO CECUM;  Surgeon: Yves Berg MD;  Location: [facility] ENDOSCOPY;  Service: General;  Laterality: N/A;  HX OF COLON POLYPS  POST: DIVERTICULOSIS    HYSTERECTOMY      THYROIDECTOMY, PARTIAL                          PT Assessment/Plan       Row Name 24 0900          PT Assessment    Assessment Comments Pt reports good tolerance for previous session with some improvement in ability to contract her PFM. Continued with current POC with progression to include side steps, AR press, and step up with PFM contraction. Pt requires cues for appropriate breathing technique and muscle activation but overall  tolerates session well and remains appropriate for skilled PT.  -RS        PT Plan    PT Plan Comments Continue to progress functional achallenges, consider monster walk, SLS  -RS               User Key  (r) = Recorded By, (t) = Taken By, (c) = Cosigned By      Initials Name Provider Type    RS Christy Fontana, PT Physical Therapist                       OP Exercises       Row Name 11/07/24 0800             Subjective    Subjective Comments Pt reports she has been oing wel with her exercises  -RS         Subjective Pain    Able to rate subjective pain? yes  -RS      Pre-Treatment Pain Level 0  -RS      Post-Treatment Pain Level 0  -RS         Total Minutes    57596 - PT Therapeutic Exercise Minutes 23  -RS      09089 - PT Therapeutic Activity Minutes 15  -RS         Exercise 1    Exercise Name 1 HL pelvic elevator  -RS      Cueing 1 Verbal;Demo  -RS      Reps 1 10ea  -RS         Exercise 2    Exercise Name 2 bridge with PFM  -RS      Cueing 2 Verbal;Demo  -RS      Sets 2 2  -RS      Reps 2 10  -RS         Exercise 3    Exercise Name 3 PFM with TA and SLR  -RS      Cueing 3 Verbal;Demo  -RS      Reps 3 12ea  -RS      Time 3 --  -RS         Exercise 4    Exercise Name 4 sl clamshell  -RS      Cueing 4 Verbal;Demo  -RS      Reps 4 20  -RS      Time 4 GTB  -RS         Exercise 5    Exercise Name 5 step up with PFM  -RS      Cueing 5 Verbal;Demo  -RS      Sets 5 --  -RS      Reps 5 10ea  -RS      Time 5 6 inch  -RS         Exercise 6    Exercise Name 6 STS with PFM and exhale  -RS      Cueing 6 Verbal;Demo  -RS      Reps 6 10  -RS      Time 6 slightly elevated  -RS         Exercise 7    Exercise Name 7 side steps  -RS      Cueing 7 Verbal;Demo  -RS      Reps 7 3 laps  -RS      Time 7 GTB  -RS         Exercise 8    Exercise Name 8 AR press  -RS      Cueing 8 Verbal;Demo  -RS      Reps 8 15  -RS      Time 8 RTB  -RS                User Key  (r) = Recorded By, (t) = Taken By, (c) = Cosigned By      Initials Name Provider  Type    RS Christy Fontana PT Physical Therapist                                  PT OP Goals       Row Name 11/07/24 0800          PT Short Term Goals    STG Date to Achieve 12/06/24  -RS     STG 1 The pt will report understanding of the use of the knack to facilitate improved pressure management.  -RS     STG 1 Progress Progressing  -RS     STG 2 The pt will report understanding of appropriate fluid intake to facilitate improved hydration and understanding of self care.  -RS     STG 2 Progress Ongoing;Progressing  -RS        Long Term Goals    LTG Date to Achieve 01/20/25  -RS     LTG 1 The pt will demonstrate IND and compliant with HEP focused on IND condition management and return to PLOF.  -RS     LTG 1 Progress Ongoing  -RS     LTG 2 The pt will report voiding intervals in the AM on average at least 2 hours (compared to 1 hour) to facilitate improved participation in daily activities.  -RS     LTG 2 Progress Ongoing;Progressing  -RS     LTG 2 Progress Comments about 90 min  -RS     LTG 3 The pt will demonstrate hip strength at least 4+/5 to facilitate improved funcitonal strength.  -RS     LTG 3 Progress Ongoing  -RS     LTG 4 The pt will demonstrate ability to contract PFM and TrA without bearing down to facilitate improved recreational activity performance.  -RS     LTG 4 Progress Ongoing  -RS     LTG 5 The pt will report at least 60% reduction in KARLY to facilitate improved pressure management and decreased involuntary urine loss.  -RS     LTG 5 Progress Ongoing  -RS               User Key  (r) = Recorded By, (t) = Taken By, (c) = Cosigned By      Initials Name Provider Type    Christy Radford PT Physical Therapist                    Therapy Education  Given: HEP  Program: Reinforced  How Provided: Verbal, Demonstration  Provided to: Patient  Level of Understanding: Verbalized, Demonstrated              Time Calculation:   Start Time: 0838  Stop Time: 0917  Time Calculation (min): 39 min  Timed  Charges  95274 - PT Therapeutic Exercise Minutes: 23  86211 - PT Therapeutic Activity Minutes: 15  Total Minutes  Timed Charges Total Minutes: 38   Total Minutes: 38  Therapy Charges for Today       Code Description Service Date Service Provider Modifiers Qty    [id number]  PT THER PROC EA 15 MIN 11/7/2024 Christy Fontana, PT GP 2    [id number]  PT THERAPEUTIC ACT EA 15 MIN 11/7/2024 Christy Fontana, PT GP 1                      Christy Fontana PT  11/7/2024

## 2024-11-08 ENCOUNTER — OFFICE VISIT (OUTPATIENT)
Dept: OBSTETRICS AND GYNECOLOGY | Age: 64
End: 2024-11-08
Payer: MEDICARE

## 2024-11-08 VITALS
WEIGHT: 206 LBS | BODY MASS INDEX: 32.33 KG/M2 | SYSTOLIC BLOOD PRESSURE: 124 MMHG | DIASTOLIC BLOOD PRESSURE: 82 MMHG | HEIGHT: 67 IN

## 2024-11-08 DIAGNOSIS — Z01.419 WELL WOMAN EXAM WITH ROUTINE GYNECOLOGICAL EXAM: Primary | ICD-10-CM

## 2024-11-08 DIAGNOSIS — Z78.0 POSTMENOPAUSAL: ICD-10-CM

## 2024-11-08 DIAGNOSIS — Z12.31 ENCOUNTER FOR SCREENING MAMMOGRAM FOR MALIGNANT NEOPLASM OF BREAST: ICD-10-CM

## 2024-11-08 RX ORDER — FUROSEMIDE 20 MG/1
1 TABLET ORAL DAILY
COMMUNITY

## 2024-11-08 NOTE — PROGRESS NOTES
Subjective     Chief Complaint   Patient presents with    Gynecologic Exam     New GYN, Annual, last pap 2019 neg, mg 10/14/2022, csy 10/13/2022, no dexa  Cc:  no problems       History of Present Illness    Bruna Jacobs is a 64 y.o.  who presents for annual exam.    New GYN   S/p hysterectomy for bleeding due to fibroids 22 years ago. Still has ovaries.   Colonoscopy UTD  Mammo is due  Has never had a dexa  Has no GYN concerns or complaints today  PCP - Dr Hawk      Obstetric History:  OB History          3    Para   3    Term   3            AB        Living             SAB        IAB        Ectopic        Molar        Multiple        Live Births                   Menstrual History:     No LMP recorded (lmp unknown). Patient has had a hysterectomy.         Current contraception: status post hysterectomy  History of abnormal Pap smear: no  Received Gardasil immunization: no  Perform regular self breast exam: yes - .  Family history of uterine or ovarian cancer: no  Family History of colon cancer: no  Family history of breast cancer: no    Mammogram: ordered.  Colonoscopy: up to date.  DEXA: ordered.    Exercise: moderately active  Calcium/Vitamin D: adequate intake    The following portions of the patient's history were reviewed and updated as appropriate: allergies, current medications, past family history, past medical history, past social history, past surgical history, and problem list.    Review of Systems   Constitutional: Negative.    Respiratory: Negative.     Cardiovascular: Negative.    Gastrointestinal: Negative.    Genitourinary: Negative.    Skin: Negative.    Psychiatric/Behavioral: Negative.             Objective   Physical Exam  Constitutional:       General: She is awake.      Appearance: Normal appearance. She is well-developed.   HENT:      Head: Normocephalic and atraumatic.      Nose: Nose normal.   Neck:      Thyroid: No thyroid mass, thyromegaly or thyroid  "tenderness.   Cardiovascular:      Rate and Rhythm: Normal rate and regular rhythm.      Pulses: Normal pulses.      Heart sounds: Normal heart sounds.   Pulmonary:      Effort: Pulmonary effort is normal.      Breath sounds: Normal breath sounds.   Chest:   Breasts:     Breasts are symmetrical.      Right: Normal. No swelling, bleeding, inverted nipple, mass, nipple discharge, skin change or tenderness.      Left: Normal. No swelling, bleeding, inverted nipple, mass, nipple discharge, skin change or tenderness.   Abdominal:      General: Abdomen is flat. Bowel sounds are normal.      Palpations: Abdomen is soft.      Tenderness: There is no abdominal tenderness.   Genitourinary:     General: Normal vulva.      Labia:         Right: No rash, tenderness, lesion or injury.         Left: No rash, tenderness, lesion or injury.       Urethra: No prolapse, urethral pain, urethral swelling or urethral lesion.      Vagina: Normal. No signs of injury. No vaginal discharge, erythema, tenderness, bleeding, lesions or prolapsed vaginal walls.      Adnexa: Right adnexa normal and left adnexa normal.        Right: No mass, tenderness or fullness.          Left: No mass, tenderness or fullness.        Rectum: Normal. No mass.      Comments: Uterus and cervix absent  Musculoskeletal:      Cervical back: Normal range of motion and neck supple.   Lymphadenopathy:      Upper Body:      Right upper body: No supraclavicular adenopathy.      Left upper body: No supraclavicular adenopathy.   Skin:     General: Skin is warm and dry.   Neurological:      General: No focal deficit present.      Mental Status: She is alert and oriented to person, place, and time.   Psychiatric:         Mood and Affect: Mood normal.         Behavior: Behavior normal. Behavior is cooperative.         Thought Content: Thought content normal.         Judgment: Judgment normal.       /82   Ht 170.2 cm (67\")   Wt 93.4 kg (206 lb)   LMP  (LMP Unknown)   " BMI 32.26 kg/m²     Assessment & Plan   Diagnoses and all orders for this visit:    1. Well woman exam with routine gynecological exam (Primary)    2. Postmenopausal  -     DEXA Bone Density Axial; Future    3. Encounter for screening mammogram for malignant neoplasm of breast  -     Mammo Screening Digital Tomosynthesis Bilateral With CAD; Future        All questions answered.  Breast self exam technique reviewed and patient encouraged to perform self-exam monthly.  Discussed healthy lifestyle modifications.  Recommended 30 minutes of aerobic exercise five times per week.  Discussed calcium needs to prevent osteoporosis.

## 2024-11-14 ENCOUNTER — HOSPITAL ENCOUNTER (OUTPATIENT)
Dept: PHYSICAL THERAPY | Facility: HOSPITAL | Age: 64
Setting detail: THERAPIES SERIES
Discharge: HOME | End: 2024-11-14
Payer: MEDICARE

## 2024-11-14 DIAGNOSIS — N81.89 PELVIC FLOOR WEAKNESS: ICD-10-CM

## 2024-11-14 DIAGNOSIS — M62.89 PELVIC FLOOR DYSFUNCTION: ICD-10-CM

## 2024-11-14 DIAGNOSIS — N39.3 STRESS INCONTINENCE: Primary | ICD-10-CM

## 2024-11-14 PROCEDURE — 97110 THERAPEUTIC EXERCISES: CPT

## 2024-11-14 PROCEDURE — 97530 THERAPEUTIC ACTIVITIES: CPT

## 2024-11-14 NOTE — THERAPY TREATMENT NOTE
Outpatient Physical Therapy Ortho Treatment Note  [facility]     Patient Name: Bruna Jacobs  : 1960  MRN: [id number]  Today's Date: 2024      Visit Date: 2024    Visit Dx:    ICD-10-CM ICD-9-CM   1. Stress incontinence  N39.3 [id number]   2. Pelvic floor dysfunction  M62.89 618.83   3. Pelvic floor weakness  N81.89 618.89       Patient Active Problem List   Diagnosis    Essential hypertension    Fatigue    Left carotid bruit    Palpitations    Hyperlipidemia    Palpitation    Precordial pain    Thyroid nodule    Heart murmur    History of adenomatous polyp of colon        Past Medical History:   Diagnosis Date    GERD (gastroesophageal reflux disease)     Glaucoma     Heart murmur     History of thyroid cyst     History of transfusion     Hyperlipidemia     Hypertension     Sinus congestion     Sleep apnea     Vertigo         Past Surgical History:   Procedure Laterality Date    CARDIAC CATHETERIZATION N/A 03/15/2019    Procedure: Left Heart Cath;  Surgeon: Graciela Boland MD;  Location: [facility] CATH INVASIVE LOCATION;  Service: Cardiology    CARDIAC CATHETERIZATION N/A 03/15/2019    Procedure: Coronary angiography;  Surgeon: Graciela Boland MD;  Location:  [facility] CATH INVASIVE LOCATION;  Service: Cardiology    COLONOSCOPY      COLONOSCOPY N/A 10/13/2022    Procedure: COLONOSCOPY TO CECUM;  Surgeon: Yves Berg MD;  Location: [facility] ENDOSCOPY;  Service: General;  Laterality: N/A;  HX OF COLON POLYPS  POST: DIVERTICULOSIS    HYSTERECTOMY      THYROIDECTOMY, PARTIAL                          PT Assessment/Plan       Row Name 24 0900          PT Assessment    Assessment Comments Pt reports no significant huggins esince last session but overall is feeling slightly stronger. Reviewed appropriate fluid intake as well as the miya, pt receptive. Continued with strength program focused on lumbopelvic stability, pt requires cues for appropriate breath mechanics and to avoid  compensation with glutes. Updated HEP and reviewed POC with pt who reports understanding.  -RS        PT Plan    PT Plan Comments consider 2-3 week break after next session to focus on HEP  -RS               User Key  (r) = Recorded By, (t) = Taken By, (c) = Cosigned By      Initials Name Provider Type    RS Christy Fontana, PT Physical Therapist                       OP Exercises       Row Name 11/14/24 0800             Subjective    Subjective Comments Pt reports she is feeling about the same  -RS         Subjective Pain    Able to rate subjective pain? yes  -RS      Pre-Treatment Pain Level 0  -RS      Post-Treatment Pain Level 0  -RS         Total Minutes    57985 - PT Therapeutic Exercise Minutes 23  -RS      20451 - PT Therapeutic Activity Minutes 15  -RS         Exercise 1    Exercise Name 1 seated pelvic elevator  -RS      Cueing 1 Verbal;Demo  -RS      Reps 1 10ea  -RS         Exercise 2    Exercise Name 2 bridge with PFM  -RS      Cueing 2 Verbal;Demo  -RS      Sets 2 2  -RS      Reps 2 10  -RS         Exercise 3    Exercise Name 3 PFM with TA and SLR  -RS      Cueing 3 Verbal;Demo  -RS      Reps 3 12ea  -RS         Exercise 4    Exercise Name 4 sl clamshell  -RS      Cueing 4 Verbal;Demo  -RS      Reps 4 20  -RS      Time 4 GTB  -RS         Exercise 5    Exercise Name 5 step up with PFM  -RS      Cueing 5 Verbal;Demo  -RS      Reps 5 15 ea  -RS      Time 5 6 inch  -RS         Exercise 6    Exercise Name 6 STS with PFM and exhale  -RS      Cueing 6 Verbal;Demo  -RS      Reps 6 10  -RS      Time 6 slightly elevated  -RS         Exercise 7    Exercise Name 7 side steps  -RS      Cueing 7 Verbal;Demo  -RS      Reps 7 3 laps  -RS      Time 7 GTB  -RS         Exercise 8    Exercise Name 8 AR press  -RS      Cueing 8 Verbal;Demo  -RS      Reps 8 15  -RS      Time 8 RTB  -RS         Exercise 9    Exercise Name 9 marching with PFM  -RS      Cueing 9 Verbal;Demo  -RS      Reps 9 3 laps  -RS         Exercise 10     Exercise Name 10 shoulder ext with TA  -RS      Sets 10 15  -RS      Reps 10 RTB  -RS                User Key  (r) = Recorded By, (t) = Taken By, (c) = Cosigned By      Initials Name Provider Type    Christy Radford PT Physical Therapist                                  PT OP Goals       Row Name 11/14/24 0800          PT Short Term Goals    STG Date to Achieve 12/06/24  -RS     STG 1 The pt will report understanding of the use of the knack to facilitate improved pressure management.  -RS     STG 1 Progress Partially Met  -RS     STG 1 Progress Comments has attempted a few times  -RS     STG 2 The pt will report understanding of appropriate fluid intake to facilitate improved hydration and understanding of self care.  -RS     STG 2 Progress Ongoing;Progressing  -RS     STG 2 Progress Comments drinking 1.5 bottles of water  -RS        Long Term Goals    LTG Date to Achieve 01/20/25  -RS     LTG 1 The pt will demonstrate IND and compliant with HEP focused on IND condition management and return to PLOF.  -RS     LTG 1 Progress Ongoing  -RS     LTG 2 The pt will report voiding intervals in the AM on average at least 2 hours (compared to 1 hour) to facilitate improved participation in daily activities.  -RS     LTG 2 Progress Ongoing;Progressing  -RS     LTG 3 The pt will demonstrate hip strength at least 4+/5 to facilitate improved funcitonal strength.  -RS     LTG 3 Progress Ongoing  -RS     LTG 4 The pt will demonstrate ability to contract PFM and TrA without bearing down to facilitate improved recreational activity performance.  -RS     LTG 4 Progress Ongoing  -RS     LTG 5 The pt will report at least 60% reduction in KARLY to facilitate improved pressure management and decreased involuntary urine loss.  -RS     LTG 5 Progress Ongoing  -RS               User Key  (r) = Recorded By, (t) = Taken By, (c) = Cosigned By      Initials Name Provider Type    Christy Radford PT Physical Therapist                     Therapy Education  Given: HEP  Program: Reinforced, Progressed  How Provided: Verbal, Demonstration, Written  Provided to: Patient  Level of Understanding: Verbalized, Demonstrated              Time Calculation:   Start Time: 0830  Stop Time: 0910  Time Calculation (min): 40 min  Timed Charges  91630 - PT Therapeutic Exercise Minutes: 23  04355 - PT Therapeutic Activity Minutes: 15  Total Minutes  Timed Charges Total Minutes: 38   Total Minutes: 38  Therapy Charges for Today       Code Description Service Date Service Provider Modifiers Qty    [id number]  PT THER PROC EA 15 MIN 11/14/2024 Christy Fontana, PT GP 2    [id number]  PT THERAPEUTIC ACT EA 15 MIN 11/14/2024 Christy Fontana, PT GP 1                      Christy Fontana PT  11/14/2024

## 2024-11-21 ENCOUNTER — HOSPITAL ENCOUNTER (OUTPATIENT)
Dept: PHYSICAL THERAPY | Facility: HOSPITAL | Age: 64
Setting detail: THERAPIES SERIES
Discharge: HOME | End: 2024-11-21
Payer: MEDICARE

## 2024-11-21 DIAGNOSIS — N39.3 STRESS INCONTINENCE: Primary | ICD-10-CM

## 2024-11-21 DIAGNOSIS — M62.89 PELVIC FLOOR DYSFUNCTION: ICD-10-CM

## 2024-11-21 DIAGNOSIS — N81.89 PELVIC FLOOR WEAKNESS: ICD-10-CM

## 2024-11-21 PROCEDURE — 97110 THERAPEUTIC EXERCISES: CPT

## 2024-11-21 PROCEDURE — 97530 THERAPEUTIC ACTIVITIES: CPT

## 2024-11-21 NOTE — THERAPY DISCHARGE NOTE
Outpatient Physical Therapy Ortho Progress Note/Discharge Summary  [facility]     Patient Name: Bruna Jacobs  : 1960  MRN: [id number]  Today's Date: 2024      Visit Date: 2024    Visit Dx:    ICD-10-CM ICD-9-CM   1. Stress incontinence  N39.3 [id number]   2. Pelvic floor dysfunction  M62.89 618.83   3. Pelvic floor weakness  N81.89 618.89       Patient Active Problem List   Diagnosis    Essential hypertension    Fatigue    Left carotid bruit    Palpitations    Hyperlipidemia    Palpitation    Precordial pain    Thyroid nodule    Heart murmur    History of adenomatous polyp of colon        Past Medical History:   Diagnosis Date    GERD (gastroesophageal reflux disease)     Glaucoma     Heart murmur     History of thyroid cyst     History of transfusion     Hyperlipidemia     Hypertension     Sinus congestion     Sleep apnea     Vertigo         Past Surgical History:   Procedure Laterality Date    CARDIAC CATHETERIZATION N/A 03/15/2019    Procedure: Left Heart Cath;  Surgeon: Graciela Boland MD;  Location: [facility] CATH INVASIVE LOCATION;  Service: Cardiology    CARDIAC CATHETERIZATION N/A 03/15/2019    Procedure: Coronary angiography;  Surgeon: Graciela Boland MD;  Location:  [facility] CATH INVASIVE LOCATION;  Service: Cardiology    COLONOSCOPY      COLONOSCOPY N/A 10/13/2022    Procedure: COLONOSCOPY TO CECUM;  Surgeon: Yves Berg MD;  Location: [facility] ENDOSCOPY;  Service: General;  Laterality: N/A;  HX OF COLON POLYPS  POST: DIVERTICULOSIS    HYSTERECTOMY      THYROIDECTOMY, PARTIAL          PT Ortho       Row Name 24 0900       MMT Right Lower Ext    Rt Hip Flexion MMT, Gross Movement (4+/5) good plus  -RS    Rt Hip Extension MMT, Gross Movement (4/5) good  -RS    Rt Hip ABduction MMT, Gross Movement (4/5) good  -RS    Rt Hip Internal (Medial) Rotation MMT, Gross Movement (4/5) good  -RS    Rt Hip External (Lateral) Rotation MMT, Gross Movement (4/5) good  -RS        MMT Left Lower Ext    Lt Hip Flexion MMT, Gross Movement (4+/5) good plus  -RS    Lt Hip Extension MMT, Gross Movement (4/5) good  -RS    Lt Hip ABduction MMT, Gross Movement (4/5) good  -RS    Lt Hip Internal (Medial) Rotation MMT, Gross Movement (4/5) good  -RS              User Key  (r) = Recorded By, (t) = Taken By, (c) = Cosigned By      Initials Name Provider Type    Christy Radford PT Physical Therapist                             Pelvic Health       Row Name 11/21/24 0900             Outcome Measures    Outcome Measure Options Quality of Life Symptom Disability Index  -RS         Incontinence Impact Questionnaire    Ability to do household chores (cooking, housecleaning, laundry)? 2  -RS      Physical recreation such as walking, swimming, or other exercise? 2  -RS      Entertainment activities (movies, concerts, etc)? 2  -RS      Ability to travel by car or bus more than 30 minutes from home? 2  -RS      Participation in social activities outside your home? 2  -RS      Emotional health (nervousness, depression, etc.)? 2  -RS      Feeling frustrated? 2  -RS      Incontinence Impact Total 14  -RS                User Key  (r) = Recorded By, (t) = Taken By, (c) = Cosigned By      Initials Name Provider Type    Christy Radford PT Physical Therapist                     PT Assessment/Plan       Row Name 11/21/24 0900          PT Assessment    Assessment Comments Pt is feeling 40-50% improvement in KARLY and good compliance with HEP. Continued withcurrent program focused on basic limbopelvc stability. She has met 2/2 STG and partially met 4/5 LTG with remaining unmet goal unmet. Reviewed POC, pt reports she desires trial of IND management with HEP therefore updated HEP and reviewed with pt.  -RS        PT Plan    PT Plan Comments Hold on PT, pt to call as needed  -RS               User Key  (r) = Recorded By, (t) = Taken By, (c) = Cosigned By      Initials Name Provider Type    Christy Radford PT  Physical Therapist                         OP Exercises       Row Name 11/21/24 0800             Subjective    Subjective Comments t rpeorts she feels good with the exercise  -RS         Subjective Pain    Able to rate subjective pain? yes  -RS      Pre-Treatment Pain Level 0  -RS      Post-Treatment Pain Level 0  -RS         Total Minutes    38352 - PT Therapeutic Exercise Minutes 23  -RS      72976 - PT Therapeutic Activity Minutes 15  -RS         Exercise 1    Exercise Name 1 seated pelvic elevator  -RS      Cueing 1 Verbal;Demo  -RS      Reps 1 10ea  -RS         Exercise 2    Exercise Name 2 bridge with PFM  -RS      Cueing 2 Verbal;Demo  -RS      Sets 2 2  -RS      Reps 2 10  -RS         Exercise 3    Exercise Name 3 PFM with TA and SLR  -RS      Cueing 3 Verbal;Demo  -RS      Reps 3 12ea  -RS         Exercise 4    Exercise Name 4 sl clamshell  -RS      Cueing 4 Verbal;Demo  -RS      Reps 4 20  -RS      Time 4 GTB  -RS         Exercise 5    Exercise Name 5 step up with PFM  -RS      Cueing 5 Verbal;Demo  -RS      Reps 5 15 ea  -RS      Time 5 6 inch  -RS         Exercise 6    Exercise Name 6 STS with PFM and exhale  -RS      Cueing 6 Verbal;Demo  -RS      Reps 6 10  -RS      Time 6 slightly elevated  -RS         Exercise 7    Exercise Name 7 side steps  -RS      Cueing 7 Verbal;Demo  -RS      Reps 7 3 laps  -RS      Time 7 GTB  -RS         Exercise 8    Exercise Name 8 AR press  -RS      Cueing 8 Verbal;Demo  -RS      Reps 8 15  -RS      Time 8 RTB  -RS         Exercise 9    Exercise Name 9 --  -RS      Cueing 9 --  -RS      Reps 9 --  -RS         Exercise 10    Exercise Name 10 shoulder ext with TA  -RS      Sets 10 15  -RS      Reps 10 RTB  -RS                User Key  (r) = Recorded By, (t) = Taken By, (c) = Cosigned By      Initials Name Provider Type    RS Christy Fontana, PT Physical Therapist                                    PT OP Goals       Row Name 11/21/24 0800          PT Short Term Goals    STG  Date to Achieve 12/06/24  -RS     STG 1 The pt will report understanding of the use of the knack to facilitate improved pressure management.  -RS     STG 1 Progress Met  -RS     STG 2 The pt will report understanding of appropriate fluid intake to facilitate improved hydration and understanding of self care.  -RS     STG 2 Progress Met  -RS     STG 2 Progress Comments put a reminder on phone  -RS        Long Term Goals    LTG Date to Achieve 01/20/25  -RS     LTG 1 The pt will demonstrate IND and compliant with HEP focused on IND condition management and return to PLOF.  -RS     LTG 1 Progress Partially Met  -RS     LTG 2 The pt will report voiding intervals in the AM on average at least 2 hours (compared to 1 hour) to facilitate improved participation in daily activities.  -RS     LTG 2 Progress Partially Met  -RS     LTG 2 Progress Comments frequency after HTN medication, every 2 hours otherwise  -RS     LTG 3 The pt will demonstrate hip strength at least 4+/5 to facilitate improved funcitonal strength.  -RS     LTG 3 Progress Partially Met  -RS     LTG 4 The pt will demonstrate ability to contract PFM and TrA without bearing down to facilitate improved recreational activity performance.  -RS     LTG 4 Progress Not Met  -RS     LTG 4 Progress Comments pt declined formal PFM assessment  -RS     LTG 5 The pt will report at least 60% reduction in KARLY to facilitate improved pressure management and decreased involuntary urine loss.  -RS     LTG 5 Progress Partially Met  -RS     LTG 5 Progress Comments about [facility] better per pt report  -RS               User Key  (r) = Recorded By, (t) = Taken By, (c) = Cosigned By      Initials Name Provider Type    RS Christy Fontana, PT Physical Therapist                    Therapy Education  Given: HEP  Program: Reinforced, Progressed  How Provided: Verbal, Demonstration, Written  Provided to: Patient  Level of Understanding: Verbalized, Demonstrated              Time  Calculation:   Start Time: 0828  Stop Time: 0908  Time Calculation (min): 40 min  Timed Charges  99392 - PT Therapeutic Exercise Minutes: 23  08735 - PT Therapeutic Activity Minutes: 15  Total Minutes  Timed Charges Total Minutes: 38   Total Minutes: 38  Therapy Charges for Today       Code Description Service Date Service Provider Modifiers Qty    36176102092 HC PT THER PROC EA 15 MIN 11/21/2024 Christy Fontana, PT GP, KX 2    80219239280 HC PT THERAPEUTIC ACT EA 15 MIN 11/21/2024 Christy Fontana, PT GP, KX 1                  OP PT Discharge Summary  Reason for Discharge: Maximum functional potential achieved  Outcomes Achieved: Patient able to partially acheive established goals, Refer to plan of care for updates on goals achieved      Christy Fontana PT  11/21/2024

## 2025-01-14 RX ORDER — ESCITALOPRAM OXALATE 10 MG/1
10 TABLET ORAL DAILY
Qty: 90 TABLET | Refills: 3 | Status: SHIPPED | OUTPATIENT
Start: 2025-01-14

## 2025-01-14 RX ORDER — FLUTICASONE PROPIONATE 50 MCG
2 SPRAY, SUSPENSION (ML) NASAL DAILY
Qty: 16 G | Refills: 1 | Status: SHIPPED | OUTPATIENT
Start: 2025-01-14

## 2025-01-20 ENCOUNTER — OFFICE VISIT (OUTPATIENT)
Dept: FAMILY MEDICINE CLINIC | Facility: CLINIC | Age: 65
End: 2025-01-20
Payer: MEDICARE

## 2025-01-20 VITALS
WEIGHT: 199 LBS | HEIGHT: 67 IN | DIASTOLIC BLOOD PRESSURE: 70 MMHG | BODY MASS INDEX: 31.23 KG/M2 | SYSTOLIC BLOOD PRESSURE: 110 MMHG

## 2025-01-20 DIAGNOSIS — Z23 NEED FOR INFLUENZA VACCINATION: Primary | ICD-10-CM

## 2025-01-20 DIAGNOSIS — I10 PRIMARY HYPERTENSION: ICD-10-CM

## 2025-01-20 DIAGNOSIS — E78.00 PURE HYPERCHOLESTEROLEMIA: ICD-10-CM

## 2025-01-20 DIAGNOSIS — J01.90 SUBACUTE SINUSITIS, UNSPECIFIED LOCATION: ICD-10-CM

## 2025-01-20 PROCEDURE — 1126F AMNT PAIN NOTED NONE PRSNT: CPT | Performed by: INTERNAL MEDICINE

## 2025-01-20 PROCEDURE — G0008 ADMIN INFLUENZA VIRUS VAC: HCPCS | Performed by: INTERNAL MEDICINE

## 2025-01-20 PROCEDURE — 99214 OFFICE O/P EST MOD 30 MIN: CPT | Performed by: INTERNAL MEDICINE

## 2025-01-20 PROCEDURE — 1159F MED LIST DOCD IN RCRD: CPT | Performed by: INTERNAL MEDICINE

## 2025-01-20 PROCEDURE — 90662 IIV NO PRSV INCREASED AG IM: CPT | Performed by: INTERNAL MEDICINE

## 2025-01-20 PROCEDURE — 3078F DIAST BP <80 MM HG: CPT | Performed by: INTERNAL MEDICINE

## 2025-01-20 PROCEDURE — 3074F SYST BP LT 130 MM HG: CPT | Performed by: INTERNAL MEDICINE

## 2025-01-20 PROCEDURE — 1160F RVW MEDS BY RX/DR IN RCRD: CPT | Performed by: INTERNAL MEDICINE

## 2025-01-20 RX ORDER — LORATADINE 10 MG/1
10 TABLET ORAL DAILY
Qty: 30 TABLET | Refills: 5 | Status: SHIPPED | OUTPATIENT
Start: 2025-01-20

## 2025-01-20 RX ORDER — LORATADINE AND PSEUDOEPHEDRINE SULFATE 5; 120 MG/1; MG/1
1 TABLET, EXTENDED RELEASE ORAL 2 TIMES DAILY
Qty: 60 TABLET | Refills: 2 | Status: CANCELLED | OUTPATIENT
Start: 2025-01-20

## 2025-01-20 NOTE — PROGRESS NOTES
01/20/2025    Assessment & Plan   This pleasant 64-year-old presents at this time accompanied by her  who gives historical information.  The patient relates that she has had some trouble with hearing over the past several days.  She denies any overt pain But notes that she has sinus problems off and on.  Her blood pressure shows good control today at 110/70 in the left arm sitting position standard cuff.  She is currently on nifedipine 60 mg p.o. daily for hypertension control.  Evaluation of her auditory canal reveals no evidence of any cerumen impaction but bulging TMs appreciated bilaterally.  She is currently taking Allegra 1 tablet p.o. daily and has been doing so over the past several months.  Will switch her to Claritin for better effect.    She relates she is taking a injection from Dr. Garay, endocrinologist for cholesterol problems.  Her cholesterol has been elevated in the past but she has been intolerant to statins.  Were unable to contact Dr. Garay  after numerous attempts      Diagnoses and all orders for this visit:    1. Need for influenza vaccination (Primary)  -     Fluzone High-Dose 65+yrs  -     Ambulatory Referral to Endocrinology    2. Primary hypertension    3. Subacute sinusitis, unspecified location    4. Pure hypercholesterolemia    Other orders  -     loratadine (Claritin) 10 MG tablet; Take 1 tablet by mouth Daily.  Dispense: 30 tablet; Refill: 5    Plan:  1.)  DC Allegra  2.)  Claritin 10 mg 1 tab p.o. daily  3.)  Referral to endocrinology at [facility] regarding hyperlipidemia and statin and sensitivity  4.)  Follow-up in 1 month for reevaluation.  Assessment & Plan  1. Otitis media with effusion.  The presence of fluid behind the tympanic membrane is indicative of eustachian tube dysfunction, which could be contributing to her sinus issues. She has been on Allegra and fluticasone nasal spray. She will discontinue the use of Allegra and complete the remaining supply. A  prescription for fexofenadine will be provided, which she can obtain from the pharmacy once her Allegra supply is exhausted. She is advised to continue using fluticasone nasal spray. A 1-month supply of fexofenadine will be provided initially, and if it proves effective, a 3-month supply will be considered.    2. Cholesterol management.  She is currently on a cholesterol injection administered every 3 months, prescribed by her endocrinologist, Dr. Garay. There has been no communication from her endocrinologist regarding her treatment plan. A request for her medical records has been sent to Dr. Garay If there is no response within a week, she will be referred to an alternative endocrinologist. She is advised to continue her current cholesterol management regimen until further notice.    3. Blood pressure management.  Her blood pressure is well-controlled. She is advised to continue her current blood pressure medication regimen.    Results                 CC: Hypertension (F/U) and Ear Fullness (Mostly in left ear.)  .        HPI  Hypertension  Pertinent negatives include no chest pain, palpitations or shortness of breath.   Ear Fullness   Pertinent negatives include no coughing, diarrhea or vomiting.      History of Present Illness  The patient presents for evaluation of ear problems, cholesterol management, and blood pressure management.    She has been experiencing persistent ear issues, characterized by a sensation of fluid accumulation. She reports a habit of pulling at her ears, which she believes contributes to her dizziness. She also mentions that she tends to remain on the first floor of her residence due to these symptoms. She does not have any pets at home and her bedroom is furnished with a wooden floor. The last time she replaced her pharmacy filter was several months ago. She is currently on a daily regimen of Allegra and fluticasone nasal spray.    She is on a new cholesterol injection, which she  takes every 3 months, prescribed by her endocrinologist, Dr. Tian. She has been on this treatment for a while now. She is unable to tolerate statins.    She took her blood pressure medication today.    ALLERGIES  The patient is sensitive to STATINS.    MEDICATIONS  Current: Allegra, fluticasone, fexofenadine    Subjective   Bruna Jacobs is a 64 y.o. female.      The following portions of the patient's history were reviewed and updated as appropriate: allergies, current medications, past family history, past medical history, past social history, past surgical history, and problem list.    Problem List  Patient Active Problem List   Diagnosis   • Essential hypertension   • Fatigue   • Left carotid bruit   • Palpitations   • Hyperlipidemia   • Palpitation   • Precordial pain   • Thyroid nodule   • Heart murmur   • History of adenomatous polyp of colon       Past Medical History  Past Medical History:   Diagnosis Date   • GERD (gastroesophageal reflux disease)    • Glaucoma    • Heart murmur    • History of thyroid cyst    • History of transfusion    • Hyperlipidemia    • Hypertension    • Sinus congestion    • Sleep apnea    • Vertigo        Surgical History  Past Surgical History:   Procedure Laterality Date   • CARDIAC CATHETERIZATION N/A 03/15/2019    Procedure: Left Heart Cath;  Surgeon: Graciela Boland MD;  Location: [facility] CATH INVASIVE LOCATION;  Service: Cardiology   • CARDIAC CATHETERIZATION N/A 03/15/2019    Procedure: Coronary angiography;  Surgeon: Graciela Boland MD;  Location:  [facility] CATH INVASIVE LOCATION;  Service: Cardiology   • COLONOSCOPY     • COLONOSCOPY N/A 10/13/2022    Procedure: COLONOSCOPY TO CECUM;  Surgeon: Yves Berg MD;  [facility] END[facility]COPY;  Service: General;  Laterality: N/A;  HX OF COLON POLYPS  POST: DIVERTICULOSIS   • HYSTERECTOMY     • THYROIDECTOMY, PARTIAL         Family History  Family History   Problem Relation Age of Onset   • Diabetes Mother     • Heart disease Mother    • Hypertension Mother    • Diabetes Father    • Hypertension Father    • Heart failure Father    • Heart disease Father    • Heart attack Neg Hx    • Malig Hyperthermia Neg Hx        Social History  Social History    Tobacco Use      Smoking status: Never      Smokeless tobacco: Never       Is the Patient a current tobacco user? No    Allergies  Allergies   Allergen Reactions   • Atorvastatin Myalgia   • Niacin Unknown - Low Severity     CANNOT REMEMBER   • Rosuvastatin Myalgia   • Statins Myalgia       Current Medications    Current Outpatient Medications:   •  aspirin  MG tablet, Take 1 tablet by mouth Daily., Disp: , Rfl:   •  escitalopram (LEXAPRO) 10 MG tablet, TAKE 1 TABLET BY MOUTH DAILY, Disp: 90 tablet, Rfl: 3  •  famotidine (PEPCID) 20 MG tablet, Take 1 tablet by mouth 2 (Two) Times a Day As Needed., Disp: , Rfl:   •  fexofenadine (Allegra Allergy) 180 MG tablet, Take 1 tablet by mouth Daily., Disp: 30 tablet, Rfl: 2  •  fluticasone (FLONASE) 50 MCG/ACT nasal spray, SPRAY TWO SPRAYS IN EACH NOSTRIL ONCE DAILY, Disp: 16 g, Rfl: 1  •  Lasix 20 MG tablet, Take 1 tablet by mouth Daily., Disp: , Rfl:   •  Multiple Vitamins-Minerals (PRESERVISION AREDS 2 PO), Take  by mouth., Disp: , Rfl:   •  NIFEdipine CC (ADALAT CC) 60 MG 24 hr tablet, Take 1 tablet by mouth Daily., Disp: , Rfl:   •  timolol (TIMOPTIC) 0.5 % ophthalmic solution, Administer 1 drop to both eyes 2 (Two) Times a Day., Disp: , Rfl:   •  VITAMIN D PO, Take  by mouth., Disp: , Rfl:   •  loratadine (Claritin) 10 MG tablet, Take 1 tablet by mouth Daily., Disp: 30 tablet, Rfl: 5  •  Multiple Vitamins-Minerals (ICAPS AREDS 2 PO), Take  by mouth., Disp: , Rfl:      Review of System  Review of Systems   Constitutional:  Negative for chills and fever.   HENT:  Positive for congestion and sinus pressure.    Respiratory:  Negative for cough and shortness of breath.    Cardiovascular:  Negative for chest pain and  palpitations.   Gastrointestinal:  Negative for constipation, diarrhea, nausea and vomiting.   Neurological:  Negative for dizziness and headache.     I have reviewed and confirmed the accuracy of the ROS as documented by the MA/LPN/RN Edward Hawk MD    Vitals:    01/20/25 0822   BP: 110/70     Body mass index is 31.17 kg/m².    Objective     Physical Exam  Physical Exam  Constitutional:       General: She is not in acute distress.     Appearance: Normal appearance.   HENT:      Head: Normocephalic and atraumatic.      Comments: Bilateral bulging TM  Cardiovascular:      Rate and Rhythm: Normal rate and regular rhythm.   Pulmonary:      Effort: Pulmonary effort is normal. No respiratory distress.      Breath sounds: Normal breath sounds. No wheezing, rhonchi or rales.   Neurological:      General: No focal deficit present.      Mental Status: She is alert and oriented to person, place, and time.   Psychiatric:         Mood and Affect: Mood normal.         Behavior: Behavior normal.         Thought Content: Thought content normal.         Judgment: Judgment normal.         Patient or patient representative verbalized consent for the use of Ambient Listening during the visit with  Edward Hawk MD for chart documentation. 1/20/2025  10:06 CHARU Hawk MD  01/20/2025

## 2025-01-21 DIAGNOSIS — E78.00 PURE HYPERCHOLESTEROLEMIA: Primary | ICD-10-CM

## 2025-02-18 ENCOUNTER — OFFICE VISIT (OUTPATIENT)
Dept: FAMILY MEDICINE CLINIC | Facility: CLINIC | Age: 65
End: 2025-02-18
Payer: MEDICARE

## 2025-02-18 ENCOUNTER — TELEPHONE (OUTPATIENT)
Dept: FAMILY MEDICINE CLINIC | Facility: CLINIC | Age: 65
End: 2025-02-18

## 2025-02-18 VITALS
HEIGHT: 67 IN | HEART RATE: 68 BPM | OXYGEN SATURATION: 97 % | BODY MASS INDEX: 31.55 KG/M2 | SYSTOLIC BLOOD PRESSURE: 128 MMHG | WEIGHT: 201 LBS | DIASTOLIC BLOOD PRESSURE: 80 MMHG

## 2025-02-18 DIAGNOSIS — E78.00 PURE HYPERCHOLESTEROLEMIA: Primary | ICD-10-CM

## 2025-02-18 DIAGNOSIS — I10 PRIMARY HYPERTENSION: ICD-10-CM

## 2025-02-18 DIAGNOSIS — J32.0 CHRONIC MAXILLARY SINUSITIS: ICD-10-CM

## 2025-02-18 DIAGNOSIS — F41.1 ANXIETY STATE: ICD-10-CM

## 2025-02-18 PROCEDURE — 1126F AMNT PAIN NOTED NONE PRSNT: CPT | Performed by: INTERNAL MEDICINE

## 2025-02-18 PROCEDURE — 3074F SYST BP LT 130 MM HG: CPT | Performed by: INTERNAL MEDICINE

## 2025-02-18 PROCEDURE — 99213 OFFICE O/P EST LOW 20 MIN: CPT | Performed by: INTERNAL MEDICINE

## 2025-02-18 PROCEDURE — 3079F DIAST BP 80-89 MM HG: CPT | Performed by: INTERNAL MEDICINE

## 2025-02-18 NOTE — TELEPHONE ENCOUNTER
Hub to relay     Called pt to see if they would  like the referral that was placed by there provider for endocrinology

## 2025-02-18 NOTE — PROGRESS NOTES
02/18/2025    Summarization of Visit         Assessment & Plan  1. Hyperlipidemia.  Her LDL cholesterol level was previously 164 mg/dL and was reduced to 85 mg/dL with Repatha. She has been on Leqvio since December 2024. Her weight has decreased by 5 pounds since starting Leqvio, despite a 2-pound increase since January 2025. The effectiveness of Leqvio will be evaluated in March 2025. The endocrinologist's opinion on the repeat cholesterol level will be awaited.    2. Hand swelling.  She reports swelling in her hands, which she attributes to her new medication. However, nifedipine, which she is currently taking, typically causes leg swelling, not hand swelling. The likelihood of the swelling being related to her medication is less than 1 percent. The situation will be monitored to see if the swelling persists.    3. Sinus issues.  She reports persistent sinus congestion despite being on Claritin. A referral to an ear, nose, and throat specialist will be made to rule out any physical blockages in her nasal passages. The specialist may perform a rhinoscope and possibly a CT scan of her sinuses. If no physical blockage is found, attention will be turned to potential allergies.    Results  Laboratory Studies  LDL cholesterol was 164, reduced to 85 with Repatha.                CC: Hyperlipidemia (F/U.  Some swelling.) and Allergies (F/U.)  .        HPI  Hyperlipidemia    Allergies   History of Present Illness  The patient presents for a follow-up visit. She has a past history of hyperlipidemia and is currently on Leqvio.    She was previously on Repatha, which was discontinued due to insurance coverage issues. She initiated Leqvio therapy in 12/2024 and is scheduled for her next injection in 03/2025.    She reports experiencing edema in her hands upon awakening each morning. She is aware that her current medication, nifedipine, can potentially cause swelling. She has been compliant with her Lasix and blood pressure  medication regimen. She has not consumed breakfast today.  No evidence of edema to be seen today.    She was prescribed Claritin for her sinus issues but continues to experience ear congestion, later she was changed to Allegra but again no improvement in her sinus congestion.    Plan:  1.)  Referral to ear nose and throat for evaluation of chronic sinusitis  2.)  Continue to take Leqvio PSK 9 antilipidemia agent  3.)    MEDICATIONS  Current: Leqvio, nifedipine, Lasix, Claritin  Discontinued: Kati Simons   Bruna Jacobs is a 64 y.o. female.      The following portions of the patient's history were reviewed and updated as appropriate: allergies, current medications, past family history, past medical history, past social history, past surgical history, and problem list.    Problem List  Patient Active Problem List   Diagnosis   • Essential hypertension   • Fatigue   • Left carotid bruit   • Palpitations   • Hyperlipidemia   • Palpitation   • Precordial pain   • Thyroid nodule   • Heart murmur   • History of adenomatous polyp of colon       Past Medical History  Past Medical History:   Diagnosis Date   • GERD (gastroesophageal reflux disease)    • Glaucoma    • Heart murmur    • History of thyroid cyst    • History of transfusion    • Hyperlipidemia    • Hypertension    • Sinus congestion    • Sleep apnea    • Vertigo        Surgical History  Past Surgical History:   Procedure Laterality Date   • CARDIAC CATHETERIZATION N/A 03/15/2019    Procedure: Left Heart Cath;  Surgeon: Graciela Boland MD;  Location: [facility] CATH INVASIVE LOCATION;  Service: Cardiology   • CARDIAC CATHETERIZATION N/A 03/15/2019    Procedure: Coronary angiography;  Surgeon: Graciela Boland MD;  Location: [facility] CATH INVASIVE LOCATION;  Service: Cardiology   • COLONOSCOPY     • COLONOSCOPY N/A 10/13/2022    Procedure: COLONOSCOPY TO CECUM;  Surgeon: Yves Berg MD;  Location: [facility] ENDOSCOPY;  Service: General;   Laterality: N/A;  HX OF COLON POLYPS  POST: DIVERTICULOSIS   • HYSTERECTOMY     • THYROIDECTOMY, PARTIAL         Family History  Family History   Problem Relation Age of Onset   • Diabetes Mother    • Heart disease Mother    • Hypertension Mother    • Diabetes Father    • Hypertension Father    • Heart failure Father    • Heart disease Father    • Heart attack Neg Hx    • Malig Hyperthermia Neg Hx        Social History  Social History    Tobacco Use      Smoking status: Never      Smokeless tobacco: Never       Is the Patient a current tobacco user? No    Allergies  Allergies   Allergen Reactions   • Atorvastatin Myalgia   • Niacin Unknown - Low Severity     CANNOT REMEMBER   • Rosuvastatin Myalgia   • Statins Myalgia       Current Medications    Current Outpatient Medications:   •  aspirin  MG tablet, Take 1 tablet by mouth Daily., Disp: , Rfl:   •  Calcium-Magnesium-Vitamin D (CALCIUM 1200+D3 PO), Take  by mouth., Disp: , Rfl:   •  escitalopram (LEXAPRO) 10 MG tablet, TAKE 1 TABLET BY MOUTH DAILY, Disp: 90 tablet, Rfl: 3  •  famotidine (PEPCID) 20 MG tablet, Take 1 tablet by mouth 2 (Two) Times a Day As Needed., Disp: , Rfl:   •  fluticasone (FLONASE) 50 MCG/ACT nasal spray, SPRAY TWO SPRAYS IN EACH NOSTRIL ONCE DAILY, Disp: 16 g, Rfl: 1  •  Inclisiran Sodium (LEQVIO SC), Inject 1 each under the skin into the appropriate area as directed Every 3 (Three) Months., Disp: , Rfl:   •  Lasix 20 MG tablet, Take 1 tablet by mouth Daily., Disp: , Rfl:   •  loratadine (Claritin) 10 MG tablet, Take 1 tablet by mouth Daily., Disp: 30 tablet, Rfl: 5  •  Multiple Vitamins-Minerals (ICAPS AREDS 2 PO), Take  by mouth., Disp: , Rfl:   •  Multiple Vitamins-Minerals (PRESERVISION AREDS 2 PO), Take  by mouth., Disp: , Rfl:   •  NIFEdipine CC (ADALAT CC) 60 MG 24 hr tablet, Take 1 tablet by mouth Daily., Disp: , Rfl:   •  timolol (TIMOPTIC) 0.5 % ophthalmic solution, Administer 1 drop to both eyes 2 (Two) Times a Day., Disp: ,  Rfl:   •  VITAMIN D PO, Take  by mouth., Disp: , Rfl:   •  fexofenadine (Allegra Allergy) 180 MG tablet, Take 1 tablet by mouth Daily. (Patient not taking: Reported on 2/18/2025), Disp: 30 tablet, Rfl: 2     Review of System  Review of Systems   Musculoskeletal:         Patient complains of occasional swelling in her hands   I have reviewed and confirmed the accuracy of the ROS as documented by the MA/LPN/RN Edward Hawk MD    Vitals:    02/18/25 0833   BP: 128/80   Pulse: 68   SpO2: 97%     Body mass index is 31.48 kg/m².    Objective     Physical Exam  Physical Exam  HENT:      Head: Normocephalic.   Cardiovascular:      Rate and Rhythm: Normal rate and regular rhythm.      Pulses: Normal pulses.      Heart sounds: Normal heart sounds.   Musculoskeletal:         General: Normal range of motion.      Cervical back: Normal range of motion.   Skin:     General: Skin is warm and dry.       Patient or patient representative verbalized consent for the use of Ambient Listening during the visit with  Edward Hawk MD for chart documentation. 2/18/2025  09:33 EST    Edward Hawk MD  02/18/2025

## 2025-03-19 ENCOUNTER — OFFICE VISIT (OUTPATIENT)
Dept: FAMILY MEDICINE CLINIC | Facility: CLINIC | Age: 65
End: 2025-03-19
Payer: MEDICARE

## 2025-03-19 VITALS
WEIGHT: 201 LBS | HEIGHT: 67 IN | SYSTOLIC BLOOD PRESSURE: 140 MMHG | BODY MASS INDEX: 31.55 KG/M2 | OXYGEN SATURATION: 97 % | HEART RATE: 77 BPM | DIASTOLIC BLOOD PRESSURE: 80 MMHG

## 2025-03-19 DIAGNOSIS — I10 PRIMARY HYPERTENSION: ICD-10-CM

## 2025-03-19 DIAGNOSIS — G47.33 OBSTRUCTIVE SLEEP APNEA: ICD-10-CM

## 2025-03-19 DIAGNOSIS — F41.1 ANXIETY STATE: ICD-10-CM

## 2025-03-19 DIAGNOSIS — E78.00 PURE HYPERCHOLESTEROLEMIA: Primary | ICD-10-CM

## 2025-03-19 DIAGNOSIS — E78.5 HYPERLIPIDEMIA, UNSPECIFIED HYPERLIPIDEMIA TYPE: ICD-10-CM

## 2025-03-19 LAB
CHOLEST SERPL-MCNC: 210 MG/DL (ref 0–200)
HDLC SERPL-MCNC: 57 MG/DL (ref 40–60)
LDLC SERPL CALC-MCNC: 130 MG/DL (ref 0–100)
TRIGL SERPL-MCNC: 130 MG/DL (ref 0–150)
VLDLC SERPL CALC-MCNC: 23 MG/DL (ref 5–40)

## 2025-03-19 NOTE — PROGRESS NOTES
03/19/2025    My Summary of Visit     This pleasant 64-year-old presents at this time for follow-up of several medical problems.  She is accompanied by her  who gives historical information.  She has had a history of hyperlipidemia but was intolerant to statins and was switched to Repatha however she had been seen by an outside endocrinologist and not like her to be seen by in-house [facility] endocrinology consultants.  Will be switching her to them.  Her last LDL was 85 approximately 6 months ago we will recheck that today.  She relates she is feeling fine has had no problems with the medication.    Her renal status is stable at this point she has CKD 2.    Her blood pressure shows good control on a single medication of nifedipine.  Note is made that it causes some swelling and she was previously seen by Dr. Boland who placed her on a small dose of Lasix patient relates that the swelling has resolved And her blood pressure shows good control.    Assessment & Plan  1. Hyperlipidemia.  Her LDL cholesterol has significantly improved, reducing from 164 to 85 with the use of Repatha. A cholesterol test will be conducted today to ensure her levels remain within the desired range. She will continue her current medication regimen.    2. Chronic sinusitis.  She was previously referred to an ear, nose, and throat specialist for evaluation.    3. Chronic kidney disease (CKD).  She was recently seen by nephrology, and her renal functions were deemed stable. An ultrasound was unremarkable. She will continue to follow up with nephrology as recommended.    4. Hypertension.  Her blood pressure is well-controlled on nifedipine 60 mg. She also takes Lasix for swelling, which helps manage her blood pressure. She will continue her current medication regimen.    5. Sleep apnea.  Patient complained of feeling fatigued and tired near the end of the day.  On close discussion we find that she was previously diagnosed with  obstructive sleep apnea and was given a mouthguard but has not had follow-up  A referral to sleep medicine will be made to evaluate the effectiveness of the mouthguard and consider alternative to the mask she was intolerant of.  I discussed with her how the nasal pillow had been tolerated by some people with use of the CPAP machine.    6. Weight management.    Results  Laboratory Studies  LDL cholesterol reduced from 164 to 85.    Imaging  Renal ultrasound was unremarkable.                CC: Hyperlipidemia (F/U---no other issues)  .        HPI  Hyperlipidemia  Pertinent negatives include no chest pain or shortness of breath.    History of Present Illness  The patient is a 64-year-old female who presents today for a follow-up of hyperlipidemia. Her LDL cholesterol had been as high as 164, but this has been reduced to 85 with the use of Repatha. At our last visit, she was also referred to an ear, nose, and throat specialist for evaluation of chronic sinusitis. She was recently seen by Dr. Mariee at [facility] for chronic kidney disease (CKD). He felt that her renal functions were stable, and an ultrasound was unremarkable.    She is currently under the care of an endocrinologist for her hyperlipidemia, with a follow-up appointment scheduled in the coming months. She received her biannual injection last Friday. Her last consultation with Dr. Arreaga was in 2024.    She has been experiencing persistent fatigue, often feeling the need to rest by 5 or 6 PM. She does not work outside the home. She occasionally wakes up as early as 4 AM and has been observed to snore slightly. Despite a previous diagnosis of sleep apnea, she has not sought further treatment. She was unable to tolerate the CPAP machine and was instead fitted with a mouthguard by Dr. Montelongo, who has since retired. She has not had any follow-up consultations regarding the effectiveness of the mouthguard.    She has been making dietary  modifications but reports minimal weight loss. Her physical activity is limited to household chores and yard work due to her constant fatigue.    She was recently seen by Dr. Mariee at [facility] for chronic kidney disease (CKD). He felt that her renal functions were stable, and an ultrasound was unremarkable.    She is on nifedipine 60 mg for blood pressure management. She also takes Lasix for swelling. She saw Dr. Ang Aguilar in 09/2024 or 10/2024.    MEDICATIONS  Nifedipine, Lasix, Repatha    Subjective   Bruna Jacobs is a 64 y.o. female.      The following portions of the patient's history were reviewed and updated as appropriate: allergies, current medications, past family history, past medical history, past social history, past surgical history, and problem list.    Problem List  Patient Active Problem List   Diagnosis    Essential hypertension    Fatigue    Left carotid bruit    Palpitations    Hyperlipidemia    Palpitation    Precordial pain    Thyroid nodule    Heart murmur    History of adenomatous polyp of colon       Past Medical History  Past Medical History:   Diagnosis Date    GERD (gastroesophageal reflux disease)     Glaucoma     Heart murmur     History of thyroid cyst     History of transfusion     Hyperlipidemia     Hypertension     Sinus congestion     Sleep apnea     Vertigo        Surgical History  Past Surgical History:   Procedure Laterality Date    CARDIAC CATHETERIZATION N/A 03/15/2019    Procedure: Left Heart Cath;  Surgeon: Graciela Boland MD;  Location:  [facility] CATH INVASIVE LOCATION;  Service: Cardiology    CARDIAC CATHETERIZATION N/A 03/15/2019    Procedure: Coronary angiography;  Surgeon: Graciela Boland MD;  Location:  [facility] CATH INVASIVE LOCATION;  Service: Cardiology    COLONOSCOPY      COLONOSCOPY N/A 10/13/2022    Procedure: COLONOSCOPY TO CECUM;  Surgeon: Yves Berg MD;  Location: [facility] ENDOSCOPY;  Service: General;  Laterality:  N/A;  HX OF COLON POLYPS  POST: DIVERTICULOSIS    HYSTERECTOMY      THYROIDECTOMY, PARTIAL         Family History  Family History   Problem Relation Age of Onset    Diabetes Mother     Heart disease Mother     Hypertension Mother     Diabetes Father     Hypertension Father     Heart failure Father     Heart disease Father     Heart attack Neg Hx     Malig Hyperthermia Neg Hx        Social History  Social History    Tobacco Use      Smoking status: Never      Smokeless tobacco: Never       Is the Patient a current tobacco user? No    Allergies  Allergies   Allergen Reactions    Atorvastatin Myalgia    Niacin Unknown - Low Severity     CANNOT REMEMBER    Rosuvastatin Myalgia    Statins Myalgia       Current Medications    Current Outpatient Medications:     aspirin  MG tablet, Take 1 tablet by mouth Daily., Disp: , Rfl:     Calcium-Magnesium-Vitamin D (CALCIUM 1200+D3 PO), Take  by mouth., Disp: , Rfl:     escitalopram (LEXAPRO) 10 MG tablet, TAKE 1 TABLET BY MOUTH DAILY, Disp: 90 tablet, Rfl: 3    famotidine (PEPCID) 20 MG tablet, Take 1 tablet by mouth 2 (Two) Times a Day As Needed., Disp: , Rfl:     fluticasone (FLONASE) 50 MCG/ACT nasal spray, SPRAY TWO SPRAYS IN EACH NOSTRIL ONCE DAILY, Disp: 16 g, Rfl: 1    Inclisiran Sodium (LEQVIO SC), Inject 1 each under the skin into the appropriate area as directed Every 3 (Three) Months., Disp: , Rfl:     Lasix 20 MG tablet, Take 1 tablet by mouth Daily., Disp: , Rfl:     loratadine (Claritin) 10 MG tablet, Take 1 tablet by mouth Daily., Disp: 30 tablet, Rfl: 5    Multiple Vitamins-Minerals (ICAPS AREDS 2 PO), Take  by mouth., Disp: , Rfl:     Multiple Vitamins-Minerals (PRESERVISION AREDS 2 PO), Take  by mouth., Disp: , Rfl:     NIFEdipine CC (ADALAT CC) 60 MG 24 hr tablet, Take 1 tablet by mouth Daily., Disp: , Rfl:     timolol (TIMOPTIC) 0.5 % ophthalmic solution, Administer 1 drop to both eyes 2 (Two) Times a Day., Disp: , Rfl:     VITAMIN D PO, Take  by mouth.,  Disp: , Rfl:      Review of System  Review of Systems   Constitutional:  Negative for chills and fever.   Respiratory:  Negative for cough and shortness of breath.    Cardiovascular:  Negative for chest pain and palpitations.   Gastrointestinal:  Negative for constipation, diarrhea, nausea and vomiting.   Neurological:  Negative for dizziness and headache.   I have reviewed and confirmed the accuracy of the ROS as documented by the MA/LPN/RN Edward Hawk MD    Vitals:    03/19/25 0804   BP: 140/80   Pulse: 77   SpO2: 97%     Body mass index is 31.48 kg/m².    Objective     Physical Exam  Physical Exam  Constitutional:       General: She is not in acute distress.     Appearance: Normal appearance.   HENT:      Head: Normocephalic and atraumatic.   Cardiovascular:      Rate and Rhythm: Normal rate and regular rhythm.   Pulmonary:      Effort: Pulmonary effort is normal. No respiratory distress.      Breath sounds: Normal breath sounds. No wheezing, rhonchi or rales.   Neurological:      General: No focal deficit present.      Mental Status: She is alert and oriented to person, place, and time.   Psychiatric:         Mood and Affect: Mood normal.         Behavior: Behavior normal.         Thought Content: Thought content normal.         Judgment: Judgment normal.       Patient or patient representative verbalized consent for the use of Ambient Listening during the visit with  Edward Hawk MD for chart documentation. 3/19/2025  09:30 EDT    Edward Hawk MD  03/19/2025

## 2025-03-21 ENCOUNTER — TELEPHONE (OUTPATIENT)
Dept: FAMILY MEDICINE CLINIC | Facility: CLINIC | Age: 65
End: 2025-03-21
Payer: MEDICARE

## 2025-03-25 ENCOUNTER — RESULTS FOLLOW-UP (OUTPATIENT)
Dept: FAMILY MEDICINE CLINIC | Facility: CLINIC | Age: 65
End: 2025-03-25
Payer: MEDICARE

## 2025-03-25 NOTE — LETTER
Bruna KAUSHIK Arlene  [address] [facility] [address]    March 26, 2025     Dear Ms. Jacobs:    Below are the results from your recent visit:    Resulted Orders   Lipid Panel   Result Value Ref Range    Total Cholesterol 210 (H) 0 - 200 mg/dL      Comment:      Cholesterol Reference Ranges  (U.S. Department of Health and Human Services ATP III  Classifications)  Desirable          <200 mg/dL  Borderline High    200-239 mg/dL  High Risk          >240 mg/dL  Triglyceride Reference Ranges  (U.S. Department of Health and Human Services ATP III  Classifications)  Normal           <150 mg/dL  Borderline High  150-199 mg/dL  High             200-499 mg/dL  Very High        >500 mg/dL  HDL Reference Ranges  (U.S. Department of Health and Human Services ATP III  Classifications)  Low     <40 mg/dl (major risk factor for CHD)  High    >60 mg/dl ('negative' risk factor for CHD)  LDL Reference Ranges  (U.S. Department of Health and Human Services ATP III  Classifications)  Optimal          <100 mg/dL  Near Optimal     100-129 mg/dL  Borderline High  130-159 mg/dL  High             160-189 mg/dL  Very High        >189 mg/dL  LDL is calculated using the NIH LDL-C calculation.      Triglycerides 130 0 - 150 mg/dL    HDL Cholesterol 57 40 - 60 mg/dL    VLDL Cholesterol Franky 23 5 - 40 mg/dL    LDL Chol Calc (NIH) 130 (H) 0 - 100 mg/dL       Your recent cholesterol test results show that your LDL/bad cholesterol his worsen to 130. Are you on a low-cholesterol diet?     If you have any questions or concerns, please don't hesitate to call.         Sincerely,        Edward Hawk MD

## 2025-03-26 NOTE — PROGRESS NOTES
Call patient to notify of results    Your recent cholesterol test results show that your LDL/bad cholesterol his worsen to 130.  Are you on a low-cholesterol diet?

## 2025-04-02 ENCOUNTER — OFFICE VISIT (OUTPATIENT)
Facility: HOSPITAL | Age: 65
End: 2025-04-02
Payer: MEDICARE

## 2025-04-02 VITALS — WEIGHT: 202.2 LBS | HEIGHT: 67 IN | OXYGEN SATURATION: 95 % | BODY MASS INDEX: 31.74 KG/M2 | HEART RATE: 85 BPM

## 2025-04-02 DIAGNOSIS — E78.5 HYPERLIPIDEMIA, UNSPECIFIED HYPERLIPIDEMIA TYPE: ICD-10-CM

## 2025-04-02 DIAGNOSIS — R53.83 OTHER FATIGUE: ICD-10-CM

## 2025-04-02 DIAGNOSIS — R00.2 PALPITATIONS: ICD-10-CM

## 2025-04-02 DIAGNOSIS — G47.33 OBSTRUCTIVE SLEEP APNEA: Primary | ICD-10-CM

## 2025-04-02 DIAGNOSIS — I10 ESSENTIAL HYPERTENSION: ICD-10-CM

## 2025-04-02 DIAGNOSIS — R07.2 PRECORDIAL PAIN: ICD-10-CM

## 2025-04-02 PROCEDURE — G0463 HOSPITAL OUTPT CLINIC VISIT: HCPCS

## 2025-04-02 NOTE — PROGRESS NOTES
Patient Care Team:  Edward Hawk MD as PCP - General  Nury Delcid APRN as Nurse Practitioner (Cardiology)  Aurora Arteaga MD, MPH as Consulting Physician (Sleep Medicine)        History of Present Illness  The patient presents for evaluation of sleep apnea.    She was previously diagnosed with sleep apnea and was prescribed a mandibular advancement device, which she no longer uses. Her  has observed intermittent snoring, particularly when she sleeps on her back, although she predominantly sleeps on her side. Despite achieving 8 to 9 hours of sleep per night, she reports waking up feeling fatigued. She does not experience sleepwalking, nightmares, or violent behavior during sleep. Her bedtime routine involves going to bed between 6:00 and 7:00 PM on weekdays and earlier on weekends due to increased fatigue. She does not report any episodes of choking or gasping for air during sleep. She also does not take naps. She underwent an overnight sleep study in the past, during which she experienced difficulty due to the wires becoming entangled as she is a restless sleeper.  She tells me that she did not tolerate CPAP therapy due to the mask and used a mandibular advancement device for some time but now has not been using anything for several years.  She does not recall when her prior sleep study was but it was many years ago.  I do not find evidence of it in care everywhere.  It was a polysomnogram.    She has been experiencing anxiety since the onset of menopause and is currently taking escitalopram, which aids in her sleep.    She takes nifedipine for blood pressure.    She takes Claritin for allergies.    She takes famotidine for heartburn but only takes it occasionally. She does not eat a lot of dairy products. Ice cream gives her heartburn.    SOCIAL HISTORY  She is retired.    MEDICATIONS  Escitalopram, nifedipine, Claritin, famotidine.       [place]: 2    Data Reviewed: Chart and sleep  "questionnaire      PMH:  Past Medical History:   Diagnosis Date    GERD (gastroesophageal reflux disease)     Glaucoma     Heart murmur     History of thyroid cyst     History of transfusion     Hyperlipidemia     Hypertension     Sinus congestion     Sleep apnea     Vertigo           Allergies:  Atorvastatin, Niacin, Rosuvastatin, and Statins     Medication Review:   Current Outpatient Medications on File Prior to Visit   Medication Sig Dispense Refill    aspirin  MG tablet Take 1 tablet by mouth Daily.      Calcium-Magnesium-Vitamin D (CALCIUM 1200+D3 PO) Take  by mouth.      escitalopram (LEXAPRO) 10 MG tablet TAKE 1 TABLET BY MOUTH DAILY 90 tablet 3    famotidine (PEPCID) 20 MG tablet Take 1 tablet by mouth 2 (Two) Times a Day As Needed.      fluticasone (FLONASE) 50 MCG/ACT nasal spray SPRAY TWO SPRAYS IN EACH NOSTRIL ONCE DAILY 16 g 1    Inclisiran Sodium (LEQVIO SC) Inject 1 each under the skin into the appropriate area as directed Every 3 (Three) Months.      Lasix 20 MG tablet Take 1 tablet by mouth Daily.      loratadine (Claritin) 10 MG tablet Take 1 tablet by mouth Daily. 30 tablet 5    Multiple Vitamins-Minerals (ICAPS AREDS 2 PO) Take  by mouth.      Multiple Vitamins-Minerals (PRESERVISION AREDS 2 PO) Take  by mouth.      NIFEdipine CC (ADALAT CC) 60 MG 24 hr tablet Take 1 tablet by mouth Daily.      timolol (TIMOPTIC) 0.5 % ophthalmic solution Administer 1 drop to both eyes 2 (Two) Times a Day.      VITAMIN D PO Take  by mouth.       No current facility-administered medications on file prior to visit.         Vital Signs:    Vitals:    04/02/25 0815   Pulse: 85   SpO2: 95%   Weight: 91.7 kg (202 lb 3.2 oz)   Height: 170.2 cm (67\")        Body mass index is 31.67 kg/m².     .BMIFOLLOWUP         Physical Exam:    Constitutional:  Well developed 64 y.o. female that appears in no apparent distress.  Awake & oriented times 3.  Normal mood with normal recent and remote memory and normal " judgement.  Eyes:  Conjunctivae normal.  Oropharynx: Moist mucous membranes without exudate and Mallampati 4  Neck: Trachea midline  Respiratory: Effort is not labored  Cardiovascular: Radial pulse regular  Musculoskeletal: Gait appears normal, no digital clubbing evident, no pre-tibial edema        Impression:   Encounter Diagnoses   Name Primary?    Obstructive sleep apnea Yes    Essential hypertension     Hyperlipidemia, unspecified hyperlipidemia type     Palpitations     Precordial pain     Other fatigue       Patient's BMI is Body mass index is 31.67 kg/m².        Assessment & Plan  1. Sleep apnea.  A home sleep study will be conducted to evaluate the current status of her sleep apnea. The study will measure respiratory effort, airflow, oxygen concentration, heart rate, snoring, and body position. If sleep apnea is confirmed, appropriate treatment options will be discussed.    2. Anxiety.  She reports experiencing anxiety since menopause and is currently taking escitalopram (Lexapro) which helps her sleep.    3. Hypertension.  She is currently taking nifedipine for blood pressure management.    4. Allergies.  She is taking Claritin for allergies.    5. Heartburn.  She takes famotidine occasionally for heartburn, which is triggered by dairy products, particularly ice cream.       The patient should practice good sleep hygiene measures.      Weight loss might be beneficial in this patient who has a Body mass index is 31.67 kg/m².      Pathophysiology of MAC described to the patient.  Cardiovascular complications of untreated MAC also reviewed.      The patient was cautioned about the dangers of drowsy driving.    Patient or patient representative verbalized consent for the use of Ambient Listening during the visit with  Paulo Arteaga MD for chart documentation. 4/2/2025  08:37 EDT     Paulo Arteaga MD  Sleep Medicine  04/02/25  08:31 EDT

## 2025-04-16 ENCOUNTER — HOSPITAL ENCOUNTER (OUTPATIENT)
Dept: SLEEP MEDICINE | Facility: HOSPITAL | Age: 65
Discharge: HOME | End: 2025-04-16
Admitting: PSYCHIATRY & NEUROLOGY
Payer: MEDICARE

## 2025-04-16 DIAGNOSIS — R53.83 OTHER FATIGUE: ICD-10-CM

## 2025-04-16 DIAGNOSIS — I10 ESSENTIAL HYPERTENSION: ICD-10-CM

## 2025-04-16 DIAGNOSIS — R00.2 PALPITATIONS: ICD-10-CM

## 2025-04-16 DIAGNOSIS — E78.5 HYPERLIPIDEMIA, UNSPECIFIED HYPERLIPIDEMIA TYPE: ICD-10-CM

## 2025-04-16 DIAGNOSIS — R07.2 PRECORDIAL PAIN: ICD-10-CM

## 2025-04-16 DIAGNOSIS — G47.33 OBSTRUCTIVE SLEEP APNEA: ICD-10-CM

## 2025-04-16 PROCEDURE — G0399 HOME SLEEP TEST/TYPE 3 PORTA: HCPCS

## 2025-04-18 DIAGNOSIS — E78.5 HYPERLIPIDEMIA, UNSPECIFIED HYPERLIPIDEMIA TYPE: ICD-10-CM

## 2025-04-18 DIAGNOSIS — G47.33 OBSTRUCTIVE SLEEP APNEA: ICD-10-CM

## 2025-04-18 DIAGNOSIS — I10 ESSENTIAL HYPERTENSION: Primary | ICD-10-CM

## 2025-04-18 DIAGNOSIS — R00.2 PALPITATIONS: ICD-10-CM

## 2025-04-22 ENCOUNTER — TELEPHONE (OUTPATIENT)
Dept: SLEEP MEDICINE | Facility: HOSPITAL | Age: 65
End: 2025-04-22
Payer: MEDICARE

## 2025-05-05 ENCOUNTER — OFFICE VISIT (OUTPATIENT)
Dept: SLEEP MEDICINE | Facility: HOSPITAL | Age: 65
End: 2025-05-05
Payer: MEDICARE

## 2025-05-05 VITALS — BODY MASS INDEX: 31.55 KG/M2 | WEIGHT: 201 LBS | HEART RATE: 98 BPM | OXYGEN SATURATION: 96 % | HEIGHT: 67 IN

## 2025-05-05 DIAGNOSIS — I10 ESSENTIAL HYPERTENSION: ICD-10-CM

## 2025-05-05 DIAGNOSIS — G47.33 OSA (OBSTRUCTIVE SLEEP APNEA): ICD-10-CM

## 2025-05-05 DIAGNOSIS — R00.2 PALPITATION: ICD-10-CM

## 2025-05-05 DIAGNOSIS — E78.5 HYPERLIPIDEMIA, UNSPECIFIED HYPERLIPIDEMIA TYPE: Primary | ICD-10-CM

## 2025-05-05 DIAGNOSIS — R53.83 OTHER FATIGUE: ICD-10-CM

## 2025-05-05 PROCEDURE — G2211 COMPLEX E/M VISIT ADD ON: HCPCS | Performed by: PSYCHIATRY & NEUROLOGY

## 2025-05-05 PROCEDURE — G0463 HOSPITAL OUTPT CLINIC VISIT: HCPCS

## 2025-05-05 PROCEDURE — 99214 OFFICE O/P EST MOD 30 MIN: CPT | Performed by: PSYCHIATRY & NEUROLOGY

## 2025-05-05 NOTE — PROGRESS NOTES
"[facility] Note       Primary Care Physician: Edward Hawk MD    Interval History:   The patient is a 64 y.o. female      History of Present Illness  The patient presents for evaluation of mild sleep apnea.    She has been diagnosed with mild sleep apnea but has not been using a CPAP machine due to associated anxiety. She reports that the machine often becomes unplugged during her sleep due to her movements, leading to awakenings. Additionally, she experiences heightened anxiety when the machine is on. She was previously provided with a dental appliance for her condition, but further follow-up was not possible as her dentist retired. She expresses a preference for this treatment modality over the CPAP machine. She takes citalopram to help her sleep.    She also mentions that she takes citalopram to help her sleep, which was prescribed by Dr. Wrihgt to manage severe anxiety that began during menopause.    MEDICATIONS  Citalopram         Review of Systems:    A complete review of systems was done and all were negative with the exception of none    Social History:    Social History     Socioeconomic History    Marital status:    Tobacco Use    Smoking status: Never    Smokeless tobacco: Never   Vaping Use    Vaping status: Never Used   Substance and Sexual Activity    Alcohol use: Not Currently     Comment: RARELY    Drug use: No    Sexual activity: Not Currently     Birth control/protection: Surgical       Allergies:  Atorvastatin, Niacin, Rosuvastatin, and Statins     Medication Review:  Reviewed.      Vital Signs:    Vitals:    05/05/25 0840   Pulse: 98   SpO2: 96%   Weight: 91.2 kg (201 lb)   Height: 170.2 cm (67\")     Body mass index is 31.48 kg/m².  .BMIFOLLOWUP    Physical Exam:    Constitutional:  Well developed 64 y.o. female that appears in no apparent distress.  Awake & oriented times 3.  Normal mood with normal recent and remote memory and normal judgement.  Eyes:  " Conjunctivae normal.      Self-administered [place] Sleepiness Scale test results: 2  0-5 Lower normal daytime sleepiness  6-10 Higher normal daytime sleepiness  11-12 Mild, 13-15 Moderate, & 16-24 Severe excessive daytime sleepiness       I have reviewed the above results and compared them with the patient's last downloads and reviewed with the patient.    Impression:     Encounter Diagnoses   Name Primary?    Essential hypertension Yes    Hyperlipidemia, unspecified hyperlipidemia type     Palpitation     Other fatigue          Assessment & Plan  1. Mild sleep apnea.  She has tried a CPAP machine but experienced significant anxiety and discomfort, leading to frequent awakenings and disconnections. A mandibular advancement device will be initiated to provide more space behind the tongue. A referral to a dentist specializing in this treatment will be made. Another sleep study may be ordered to monitor the effectiveness of the device.    2. Anxiety.  She reports experiencing severe anxiety, particularly since menopause. She is currently taking citalopram to manage her anxiety and sleep issues.         Good sleep hygiene measures should be maintained.  Weight loss would be beneficial in this patient who has obesity class I by Body mass index is 31.48 kg/m².      I answered all of the patient's questions.  The patient will call the [facility] for any problems and will follow up after meeting and initiating therapy with Dr. Rangel.    Patient or patient representative verbalized consent for the use of Ambient Listening during the visit with  Paulo Arteaga MD for chart documentation. 5/5/2025  09:10 EDT           Paulo Arteaga MD  Sleep Medicine  05/05/25  08:56 EDT           3

## 2025-06-19 ENCOUNTER — OFFICE VISIT (OUTPATIENT)
Dept: FAMILY MEDICINE CLINIC | Facility: CLINIC | Age: 65
End: 2025-06-19
Payer: MEDICARE

## 2025-06-19 VITALS
WEIGHT: 201 LBS | SYSTOLIC BLOOD PRESSURE: 118 MMHG | DIASTOLIC BLOOD PRESSURE: 80 MMHG | HEART RATE: 65 BPM | BODY MASS INDEX: 31.55 KG/M2 | OXYGEN SATURATION: 97 % | HEIGHT: 67 IN

## 2025-06-19 DIAGNOSIS — E78.00 PURE HYPERCHOLESTEROLEMIA: ICD-10-CM

## 2025-06-19 DIAGNOSIS — I10 PRIMARY HYPERTENSION: ICD-10-CM

## 2025-06-19 DIAGNOSIS — B37.2 YEAST DERMATITIS: ICD-10-CM

## 2025-06-19 DIAGNOSIS — E78.00 PURE HYPERCHOLESTEROLEMIA: Primary | ICD-10-CM

## 2025-06-19 DIAGNOSIS — R73.9 HYPERGLYCEMIA: ICD-10-CM

## 2025-06-19 DIAGNOSIS — R60.9 DEPENDENT EDEMA: ICD-10-CM

## 2025-06-19 DIAGNOSIS — F41.1 ANXIETY STATE: ICD-10-CM

## 2025-06-19 RX ORDER — BUDESONIDE 1 MG/2ML
INHALANT ORAL
COMMUNITY
Start: 2025-05-07

## 2025-06-19 NOTE — PROGRESS NOTES
06/19/2025    Assessment & Plan   This 65-year-old patient presents at this time for follow-up of several medical problems.  She has a history of hypertension is well-controlled indeed her blood pressure was 118/80 in the left arm sitting position standard cuff today.  She denies any chest pain or shortness of breath.  She is concerned about the dependent edema in the lower extremities.  She also relates that she has some swelling in her hands occasionally.  This I feel is due to her nifedipine which is being given at 60 mg p.o. daily.  She previously been given Lasix 20 mg to help with her fluid balance but she wants more fluid off her legs.  I have discussed with her that her blood pressure is relatively well-controlled and I think that this is more important than the dependent edema at this point.  She does not have congestive heart failure and I think using support hose would help alleviate the swelling in the lower extremities without having to resort to changes in the medication or adding other medications without the side effects.  Note is made that she has a number of sensitivities and allergies to medications.  She continues on her other medication including Lexapro 10 mg p.o. daily famotidine 20 mg p.o. daily and calcium carbonate 1-2 daily.  She also has a history of COPD but this is well-controlled with Pulmicort at this point.    Patient's recent LDL was elevated at 135 she was transferred to the [facility] endocrinology section from an outside endocrinologist and she is due to see them in the next several weeks.  She has been on the leqvio injections for several weeks and is due for reevaluation of her lipid profile today.    She is also developed a skin rash under both breasts is consistent with monilial infection.  We discussed with her the causes of this and reassured her that this was not a serious problem.  Will give her fungal powder for use for the next week or so.  We also discussed measures  including allowing the area to get air and keeping it dry.        Diagnoses and all orders for this visit:    1. Pure hypercholesterolemia (Primary)    2. Primary hypertension    3. Anxiety state    4. Yeast dermatitis    5. Dependent edema    6. Hyperglycemia      Plan:  1.)  Support hose for dependent edema  2.)  Hemoglobin A1c to evaluate for diabetes  3.)  Miconazole           CC: Hyperlipidemia (F/U), swelling (In her knees, legs, hands.   Possibly due to medication.), and Rash (Under breasts where her bra sits.)  .        HPI  History of Present Illness     Subjective   Bruna Jacobs is a 65 y.o. female.      The following portions of the patient's history were reviewed and updated as appropriate: allergies, current medications, past family history, past medical history, past social history, past surgical history, and problem list.    Problem List  Patient Active Problem List   Diagnosis    Essential hypertension    Fatigue    Left carotid bruit    Palpitations    Hyperlipidemia    Palpitation    Precordial pain    Thyroid nodule    Heart murmur    History of adenomatous polyp of colon       Past Medical History  Past Medical History:   Diagnosis Date    GERD (gastroesophageal reflux disease)     Glaucoma     Heart murmur     History of thyroid cyst     History of transfusion     Hyperlipidemia     Hypertension     Sinus congestion     Sleep apnea     Vertigo        Surgical History  Past Surgical History:   Procedure Laterality Date    CARDIAC CATHETERIZATION N/A 03/15/2019    Procedure: Left Heart Cath;  Surgeon: Graciela Boland MD;  Location: [facility] CATH INVASIVE LOCATION;  Service: Cardiology    CARDIAC CATHETERIZATION N/A 03/15/2019    Procedure: Coronary angiography;  Surgeon: Graciela Boland MD;  Location: [facility] CATH INVASIVE LOCATION;  Service: Cardiology    COLONOSCOPY      COLONOSCOPY N/A 10/13/2022    Procedure: COLONOSCOPY TO CECUM;  Surgeon: Yves Berg MD;  Location:   [facility];  Service: General;  Laterality: N/A;  HX OF COLON POLYPS  POST: DIVERTICULOSIS    HYSTERECTOMY      THYROIDECTOMY, PARTIAL         Family History  Family History   Problem Relation Age of Onset    Diabetes Mother     Heart disease Mother     Hypertension Mother     Diabetes Father     Hypertension Father     Heart failure Father     Heart disease Father     Heart attack Neg Hx     Malig Hyperthermia Neg Hx        Social History  Social History    Tobacco Use      Smoking status: Never      Smokeless tobacco: Never       Is the Patient a current tobacco user? No    Allergies  Allergies   Allergen Reactions    Atorvastatin Myalgia    Niacin Unknown - Low Severity     CANNOT REMEMBER    Rosuvastatin Myalgia    Statins Myalgia       Current Medications    Current Outpatient Medications:     aspirin  MG tablet, Take 1 tablet by mouth Daily., Disp: , Rfl:     budesonide (PULMICORT) 1 MG/2ML nebulizer solution, Place contents of 1 vial into 240 mL of saline (using standard [employer] sinus rinse kit) and irrigate nose once daily, Disp: , Rfl:     Calcium Carbonate 1500 (600 Ca) MG tablet, 2 (Two) Times a Day., Disp: , Rfl:     Calcium-Magnesium-Vitamin D (CALCIUM 1200+D3 PO), Take  by mouth., Disp: , Rfl:     escitalopram (LEXAPRO) 10 MG tablet, TAKE 1 TABLET BY MOUTH DAILY, Disp: 90 tablet, Rfl: 3    famotidine (PEPCID) 20 MG tablet, Take 1 tablet by mouth 2 (Two) Times a Day As Needed., Disp: , Rfl:     fluticasone (FLONASE) 50 MCG/ACT nasal spray, SPRAY TWO SPRAYS IN EACH NOSTRIL ONCE DAILY, Disp: 16 g, Rfl: 1    Inclisiran Sodium (LEQVIO SC), Inject 1 each under the skin into the appropriate area as directed Every 3 (Three) Months., Disp: , Rfl:     Lasix 20 MG tablet, Take 1 tablet by mouth Daily., Disp: , Rfl:     loratadine (Claritin) 10 MG tablet, Take 1 tablet by mouth Daily., Disp: 30 tablet, Rfl: 5    Multiple Vitamins-Minerals (ICAPS AREDS 2 PO), Take  by mouth., Disp: , Rfl:     Multiple  Vitamins-Minerals (PRESERVISION AREDS 2 PO), Take  by mouth., Disp: , Rfl:     NIFEdipine CC (ADALAT CC) 60 MG 24 hr tablet, Take 1 tablet by mouth Daily., Disp: , Rfl:     timolol (TIMOPTIC) 0.5 % ophthalmic solution, Administer 1 drop to both eyes 2 (Two) Times a Day., Disp: , Rfl:     VITAMIN D PO, Take  by mouth., Disp: , Rfl:      Review of System  Review of Systems   Constitutional:  Negative for chills and fever.   Respiratory:  Negative for cough and shortness of breath.    Cardiovascular:  Negative for chest pain and palpitations.   Gastrointestinal:  Negative for constipation, diarrhea, nausea and vomiting.   Neurological:  Negative for dizziness and headache.   I have reviewed and confirmed the accuracy of the ROS as documented by the MA/LPN/RN Edward Hawk MD    Vitals:    06/19/25 0820   BP: 118/80   Pulse: 65   SpO2: 97%     Body mass index is 31.48 kg/m².    Objective     Physical Exam  Physical Exam  Skin:     General: Skin is warm and dry.      Findings: Rash present.         Edward Hawk MD  06/19/2025

## 2025-06-20 ENCOUNTER — TELEPHONE (OUTPATIENT)
Dept: FAMILY MEDICINE CLINIC | Facility: CLINIC | Age: 65
End: 2025-06-20
Payer: MEDICARE

## 2025-06-20 LAB
ALBUMIN SERPL-MCNC: 4.6 G/DL (ref 3.9–4.9)
ALP SERPL-CCNC: 138 IU/L (ref 44–121)
ALT SERPL-CCNC: 16 IU/L (ref 0–32)
AST SERPL-CCNC: 21 IU/L (ref 0–40)
BILIRUB SERPL-MCNC: 0.3 MG/DL (ref 0–1.2)
BUN SERPL-MCNC: 11 MG/DL (ref 8–27)
BUN/CREAT SERPL: 15 (ref 12–28)
CALCIUM SERPL-MCNC: 9.1 MG/DL (ref 8.7–10.3)
CHLORIDE SERPL-SCNC: 102 MMOL/L (ref 96–106)
CHOLEST SERPL-MCNC: 191 MG/DL (ref 100–199)
CO2 SERPL-SCNC: 26 MMOL/L (ref 20–29)
CREAT SERPL-MCNC: 0.74 MG/DL (ref 0.57–1)
EGFRCR SERPLBLD CKD-EPI 2021: 89 ML/MIN/1.73
GLOBULIN SER CALC-MCNC: 2.7 G/DL (ref 1.5–4.5)
GLUCOSE SERPL-MCNC: 138 MG/DL (ref 70–99)
HBA1C MFR BLD: 7.5 % (ref 4.8–5.6)
HDLC SERPL-MCNC: 54 MG/DL
LDLC SERPL CALC-MCNC: 110 MG/DL (ref 0–99)
POTASSIUM SERPL-SCNC: 4.4 MMOL/L (ref 3.5–5.2)
PROT SERPL-MCNC: 7.3 G/DL (ref 6–8.5)
SODIUM SERPL-SCNC: 140 MMOL/L (ref 134–144)
TRIGL SERPL-MCNC: 152 MG/DL (ref 0–149)
VLDLC SERPL CALC-MCNC: 27 MG/DL (ref 5–40)

## 2025-06-20 NOTE — TELEPHONE ENCOUNTER
The medication is not covered by her insurance.  Is there an alternative that can be sent instead?

## 2025-06-27 ENCOUNTER — RESULTS FOLLOW-UP (OUTPATIENT)
Dept: FAMILY MEDICINE CLINIC | Facility: CLINIC | Age: 65
End: 2025-06-27
Payer: MEDICARE

## 2025-06-27 NOTE — PROGRESS NOTES
Call patient to notify of results    Alkaline phosphatase is slightly elevated-no treatment needed at this point.  Her cholesterol/LDL slightly improved.  Keep appointment to discuss

## 2025-06-27 NOTE — LETTER
Bruna Jacobs  [address] [facility] [address]    June 27, 2025     Dear Ms. Jacobs:    Below are the results from your recent visit:    Resulted Orders   Comprehensive metabolic panel   Result Value Ref Range    Glucose 138 (H) 70 - 99 mg/dL    BUN 11 8 - 27 mg/dL    Creatinine 0.74 0.57 - 1.00 mg/dL    EGFR Result 89 >59 mL/min/1.73    BUN/Creatinine Ratio 15 12 - 28    Sodium 140 134 - 144 mmol/L    Potassium 4.4 3.5 - 5.2 mmol/L    Chloride 102 96 - 106 mmol/L    Total CO2 26 20 - 29 mmol/L    Calcium 9.1 8.7 - 10.3 mg/dL    Total Protein 7.3 6.0 - 8.5 g/dL    Albumin 4.6 3.9 - 4.9 g/dL    Globulin 2.7 1.5 - 4.5 g/dL    Total Bilirubin 0.3 0.0 - 1.2 mg/dL    Alkaline Phosphatase 138 (H) 44 - 121 IU/L    AST (SGOT) 21 0 - 40 IU/L    ALT (SGPT) 16 0 - 32 IU/L   Lipid Panel   Result Value Ref Range    Total Cholesterol 191 100 - 199 mg/dL    Triglycerides 152 (H) 0 - 149 mg/dL    HDL Cholesterol 54 >39 mg/dL    VLDL Cholesterol Franky 27 5 - 40 mg/dL    LDL Chol Calc (NIH) 110 (H) 0 - 99 mg/dL   Hemoglobin A1c   Result Value Ref Range    Hemoglobin A1C 7.5 (H) 4.8 - 5.6 %      Comment:               Prediabetes: 5.7 - 6.4           Diabetes: >6.4           Glycemic control for adults with diabetes: <7.0         Call patient to notify of results     Alkaline phosphatase is slightly elevated-no treatment needed at this point.  Her cholesterol/LDL slightly improved.  Keep appointment to discuss    If you have any questions or concerns, please don't hesitate to call.         Sincerely,        Edward Hawk MD

## 2025-06-30 ENCOUNTER — OFFICE VISIT (OUTPATIENT)
Dept: ENDOCRINOLOGY | Age: 65
End: 2025-06-30
Payer: MEDICARE

## 2025-06-30 VITALS
OXYGEN SATURATION: 98 % | HEART RATE: 68 BPM | BODY MASS INDEX: 31.92 KG/M2 | DIASTOLIC BLOOD PRESSURE: 78 MMHG | WEIGHT: 203.4 LBS | SYSTOLIC BLOOD PRESSURE: 132 MMHG | HEIGHT: 67 IN

## 2025-06-30 DIAGNOSIS — E78.5 HYPERLIPIDEMIA, UNSPECIFIED HYPERLIPIDEMIA TYPE: Primary | ICD-10-CM

## 2025-06-30 RX ORDER — SODIUM CHLORIDE 9 MG/ML
20 INJECTION, SOLUTION INTRAVENOUS ONCE
OUTPATIENT
Start: 2025-09-11

## 2025-06-30 RX ORDER — MEPERIDINE HYDROCHLORIDE 25 MG/ML
25 INJECTION INTRAMUSCULAR; INTRAVENOUS; SUBCUTANEOUS AS NEEDED
OUTPATIENT
Start: 2025-09-11

## 2025-06-30 RX ORDER — DIPHENHYDRAMINE HYDROCHLORIDE 50 MG/ML
50 INJECTION, SOLUTION INTRAMUSCULAR; INTRAVENOUS AS NEEDED
OUTPATIENT
Start: 2025-09-11

## 2025-06-30 RX ORDER — HYDROCORTISONE SODIUM SUCCINATE 100 MG/2ML
100 INJECTION INTRAMUSCULAR; INTRAVENOUS AS NEEDED
OUTPATIENT
Start: 2025-09-11

## 2025-06-30 RX ORDER — FAMOTIDINE 10 MG/ML
20 INJECTION, SOLUTION INTRAVENOUS AS NEEDED
OUTPATIENT
Start: 2025-09-11

## 2025-06-30 NOTE — PROGRESS NOTES
Chief complaint/Reason for consult: hyperlipidemia    HPI:   - 65 year female here for hyperlipidemia  - No history of CV disease  - She had a rash and myalgias with several statins  - Her insurance did not cover Repatha  - She has been receiving Leqvio  - She also has a history of a partial thyroidectomy    The following portions of the patient's history were reviewed and updated as appropriate: allergies, current medications, past family history, past medical history, past social history, past surgical history, and problem list.      Objective     Vitals:    06/30/25 1015   BP: 132/78   Pulse: 68   SpO2: 98%        Physical Exam  Vitals reviewed.   Constitutional:       Appearance: Normal appearance.   HENT:      Head: Normocephalic and atraumatic.   Eyes:      General: No scleral icterus.  Pulmonary:      Effort: Pulmonary effort is normal. No respiratory distress.   Neurological:      Mental Status: She is alert.      Gait: Gait normal.   Psychiatric:         Mood and Affect: Mood normal.         Behavior: Behavior normal.         Thought Content: Thought content normal.         Judgment: Judgment normal.       Assessment & Plan   Hyperlipidemia  - Intolerant of several statins  - Will cont. Leqvio    2. Partial thyroidectomy, thyroid nodules  - Will monitor TSH and thyroid ultrasound    3. Elevated A1c  - Not on any glucose lowering medications  - Discussed reducing carbohydrates  - Will monitor    - Return to clinic in 1 year

## 2025-08-27 RX ORDER — FLUTICASONE PROPIONATE 50 MCG
2 SPRAY, SUSPENSION (ML) NASAL DAILY
Qty: 48 G | Refills: 1 | Status: SHIPPED | OUTPATIENT
Start: 2025-08-27

## 2025-08-27 RX ORDER — LORATADINE 10 MG/1
10 TABLET ORAL DAILY
Qty: 90 TABLET | Refills: 1 | Status: SHIPPED | OUTPATIENT
Start: 2025-08-27

## (undated) DEVICE — PK CATH CARD 40

## (undated) DEVICE — KT ORCA ORCAPOD DISP STRL

## (undated) DEVICE — CATH DIAG IMPULSE FL3.5 5F 100CM

## (undated) DEVICE — ADAPT CLN BIOGUARD AIR/H2O DISP

## (undated) DEVICE — CANN O2 ETCO2 FITS ALL CONN CO2 SMPL A/ 7IN DISP LF

## (undated) DEVICE — TUBING, SUCTION, 1/4" X 10', STRAIGHT: Brand: MEDLINE

## (undated) DEVICE — KT MANIFLD CARDIAC

## (undated) DEVICE — SENSR O2 OXIMAX FNGR A/ 18IN NONSTR

## (undated) DEVICE — CATH DIAG IMPULSE FR4 5F 100CM

## (undated) DEVICE — GLIDESHEATH SLENDER STAINLESS STEEL KIT: Brand: GLIDESHEATH SLENDER

## (undated) DEVICE — CATH VENT MIV RADL PIG ST TIP 4F 110CM

## (undated) DEVICE — GW EMR FIX EXCHG J STD .035 3MM 260CM